# Patient Record
Sex: MALE | Race: WHITE | NOT HISPANIC OR LATINO | Employment: FULL TIME | URBAN - METROPOLITAN AREA
[De-identification: names, ages, dates, MRNs, and addresses within clinical notes are randomized per-mention and may not be internally consistent; named-entity substitution may affect disease eponyms.]

---

## 2017-06-20 ENCOUNTER — TRANSCRIBE ORDERS (OUTPATIENT)
Dept: ADMINISTRATIVE | Facility: HOSPITAL | Age: 57
End: 2017-06-20

## 2017-06-20 ENCOUNTER — APPOINTMENT (OUTPATIENT)
Dept: LAB | Facility: MEDICAL CENTER | Age: 57
End: 2017-06-20
Payer: COMMERCIAL

## 2017-06-20 DIAGNOSIS — R73.01 IMPAIRED FASTING GLUCOSE: Primary | ICD-10-CM

## 2017-06-20 DIAGNOSIS — R73.01 IMPAIRED FASTING GLUCOSE: ICD-10-CM

## 2017-06-20 LAB
EST. AVERAGE GLUCOSE BLD GHB EST-MCNC: 111 MG/DL
HBA1C MFR BLD: 5.5 % (ref 4.2–6.3)

## 2017-06-20 PROCEDURE — 36415 COLL VENOUS BLD VENIPUNCTURE: CPT

## 2017-06-20 PROCEDURE — 83036 HEMOGLOBIN GLYCOSYLATED A1C: CPT

## 2017-06-27 ENCOUNTER — APPOINTMENT (OUTPATIENT)
Dept: PHYSICAL THERAPY | Facility: CLINIC | Age: 57
End: 2017-06-27
Payer: COMMERCIAL

## 2017-06-27 PROCEDURE — 97535 SELF CARE MNGMENT TRAINING: CPT

## 2017-06-27 PROCEDURE — 97014 ELECTRIC STIMULATION THERAPY: CPT

## 2017-06-27 PROCEDURE — 97035 APP MDLTY 1+ULTRASOUND EA 15: CPT

## 2017-06-27 PROCEDURE — 97162 PT EVAL MOD COMPLEX 30 MIN: CPT

## 2017-06-27 PROCEDURE — 97010 HOT OR COLD PACKS THERAPY: CPT

## 2017-06-27 PROCEDURE — 97140 MANUAL THERAPY 1/> REGIONS: CPT

## 2017-06-27 PROCEDURE — 97110 THERAPEUTIC EXERCISES: CPT

## 2017-06-27 PROCEDURE — G0283 ELEC STIM OTHER THAN WOUND: HCPCS

## 2017-06-30 ENCOUNTER — APPOINTMENT (OUTPATIENT)
Dept: PHYSICAL THERAPY | Facility: CLINIC | Age: 57
End: 2017-06-30
Payer: COMMERCIAL

## 2017-06-30 PROCEDURE — 97014 ELECTRIC STIMULATION THERAPY: CPT

## 2017-06-30 PROCEDURE — 97112 NEUROMUSCULAR REEDUCATION: CPT

## 2017-06-30 PROCEDURE — 97010 HOT OR COLD PACKS THERAPY: CPT

## 2017-06-30 PROCEDURE — 97110 THERAPEUTIC EXERCISES: CPT

## 2017-06-30 PROCEDURE — 97035 APP MDLTY 1+ULTRASOUND EA 15: CPT

## 2017-06-30 PROCEDURE — G0283 ELEC STIM OTHER THAN WOUND: HCPCS

## 2017-06-30 PROCEDURE — 97140 MANUAL THERAPY 1/> REGIONS: CPT

## 2017-07-03 ENCOUNTER — APPOINTMENT (OUTPATIENT)
Dept: PHYSICAL THERAPY | Facility: CLINIC | Age: 57
End: 2017-07-03
Payer: COMMERCIAL

## 2017-07-03 PROCEDURE — 97530 THERAPEUTIC ACTIVITIES: CPT

## 2017-07-03 PROCEDURE — 97010 HOT OR COLD PACKS THERAPY: CPT

## 2017-07-03 PROCEDURE — 97112 NEUROMUSCULAR REEDUCATION: CPT

## 2017-07-03 PROCEDURE — 97035 APP MDLTY 1+ULTRASOUND EA 15: CPT

## 2017-07-03 PROCEDURE — 97014 ELECTRIC STIMULATION THERAPY: CPT

## 2017-07-03 PROCEDURE — G0283 ELEC STIM OTHER THAN WOUND: HCPCS

## 2017-07-03 PROCEDURE — 97110 THERAPEUTIC EXERCISES: CPT

## 2017-07-03 PROCEDURE — 97140 MANUAL THERAPY 1/> REGIONS: CPT

## 2017-07-07 ENCOUNTER — APPOINTMENT (OUTPATIENT)
Dept: PHYSICAL THERAPY | Facility: CLINIC | Age: 57
End: 2017-07-07
Payer: COMMERCIAL

## 2017-07-07 PROCEDURE — 97110 THERAPEUTIC EXERCISES: CPT

## 2017-07-07 PROCEDURE — G0283 ELEC STIM OTHER THAN WOUND: HCPCS

## 2017-07-07 PROCEDURE — 97035 APP MDLTY 1+ULTRASOUND EA 15: CPT

## 2017-07-07 PROCEDURE — 97530 THERAPEUTIC ACTIVITIES: CPT

## 2017-07-07 PROCEDURE — 97014 ELECTRIC STIMULATION THERAPY: CPT

## 2017-07-07 PROCEDURE — 97010 HOT OR COLD PACKS THERAPY: CPT

## 2017-07-07 PROCEDURE — 97140 MANUAL THERAPY 1/> REGIONS: CPT

## 2017-07-07 PROCEDURE — 97112 NEUROMUSCULAR REEDUCATION: CPT

## 2017-07-10 ENCOUNTER — APPOINTMENT (OUTPATIENT)
Dept: PHYSICAL THERAPY | Facility: CLINIC | Age: 57
End: 2017-07-10
Payer: COMMERCIAL

## 2017-07-11 ENCOUNTER — APPOINTMENT (OUTPATIENT)
Dept: PHYSICAL THERAPY | Facility: CLINIC | Age: 57
End: 2017-07-11
Payer: COMMERCIAL

## 2017-07-11 PROCEDURE — 97112 NEUROMUSCULAR REEDUCATION: CPT

## 2017-07-11 PROCEDURE — 97014 ELECTRIC STIMULATION THERAPY: CPT

## 2017-07-11 PROCEDURE — 97010 HOT OR COLD PACKS THERAPY: CPT

## 2017-07-11 PROCEDURE — 97530 THERAPEUTIC ACTIVITIES: CPT

## 2017-07-11 PROCEDURE — G0283 ELEC STIM OTHER THAN WOUND: HCPCS

## 2017-07-11 PROCEDURE — 97140 MANUAL THERAPY 1/> REGIONS: CPT

## 2017-07-11 PROCEDURE — 97110 THERAPEUTIC EXERCISES: CPT

## 2017-07-11 PROCEDURE — 97035 APP MDLTY 1+ULTRASOUND EA 15: CPT

## 2017-07-14 ENCOUNTER — APPOINTMENT (OUTPATIENT)
Dept: PHYSICAL THERAPY | Facility: CLINIC | Age: 57
End: 2017-07-14
Payer: COMMERCIAL

## 2017-07-14 PROCEDURE — G0283 ELEC STIM OTHER THAN WOUND: HCPCS

## 2017-07-14 PROCEDURE — 97140 MANUAL THERAPY 1/> REGIONS: CPT

## 2017-07-14 PROCEDURE — 97010 HOT OR COLD PACKS THERAPY: CPT

## 2017-07-14 PROCEDURE — 97112 NEUROMUSCULAR REEDUCATION: CPT

## 2017-07-14 PROCEDURE — 97530 THERAPEUTIC ACTIVITIES: CPT

## 2017-07-14 PROCEDURE — 97014 ELECTRIC STIMULATION THERAPY: CPT

## 2017-07-14 PROCEDURE — 97035 APP MDLTY 1+ULTRASOUND EA 15: CPT

## 2017-07-14 PROCEDURE — 97110 THERAPEUTIC EXERCISES: CPT

## 2017-07-17 ENCOUNTER — APPOINTMENT (OUTPATIENT)
Dept: PHYSICAL THERAPY | Facility: CLINIC | Age: 57
End: 2017-07-17
Payer: COMMERCIAL

## 2017-07-17 PROCEDURE — G0283 ELEC STIM OTHER THAN WOUND: HCPCS

## 2017-07-17 PROCEDURE — 97035 APP MDLTY 1+ULTRASOUND EA 15: CPT

## 2017-07-17 PROCEDURE — 97110 THERAPEUTIC EXERCISES: CPT

## 2017-07-17 PROCEDURE — 97010 HOT OR COLD PACKS THERAPY: CPT

## 2017-07-17 PROCEDURE — 97530 THERAPEUTIC ACTIVITIES: CPT

## 2017-07-17 PROCEDURE — 97112 NEUROMUSCULAR REEDUCATION: CPT

## 2017-07-17 PROCEDURE — 97014 ELECTRIC STIMULATION THERAPY: CPT

## 2017-07-17 PROCEDURE — 97140 MANUAL THERAPY 1/> REGIONS: CPT

## 2017-07-21 ENCOUNTER — APPOINTMENT (OUTPATIENT)
Dept: PHYSICAL THERAPY | Facility: CLINIC | Age: 57
End: 2017-07-21
Payer: COMMERCIAL

## 2017-07-24 ENCOUNTER — APPOINTMENT (OUTPATIENT)
Dept: PHYSICAL THERAPY | Facility: CLINIC | Age: 57
End: 2017-07-24
Payer: COMMERCIAL

## 2017-07-24 PROCEDURE — 97010 HOT OR COLD PACKS THERAPY: CPT

## 2017-07-24 PROCEDURE — 97530 THERAPEUTIC ACTIVITIES: CPT

## 2017-07-24 PROCEDURE — 97140 MANUAL THERAPY 1/> REGIONS: CPT

## 2017-07-24 PROCEDURE — 97112 NEUROMUSCULAR REEDUCATION: CPT

## 2017-07-24 PROCEDURE — 97014 ELECTRIC STIMULATION THERAPY: CPT

## 2017-07-24 PROCEDURE — G0283 ELEC STIM OTHER THAN WOUND: HCPCS

## 2017-07-24 PROCEDURE — 97035 APP MDLTY 1+ULTRASOUND EA 15: CPT

## 2017-07-24 PROCEDURE — 97110 THERAPEUTIC EXERCISES: CPT

## 2017-07-27 ENCOUNTER — APPOINTMENT (OUTPATIENT)
Dept: LAB | Facility: MEDICAL CENTER | Age: 57
End: 2017-07-27
Payer: COMMERCIAL

## 2017-07-27 ENCOUNTER — TRANSCRIBE ORDERS (OUTPATIENT)
Dept: LAB | Facility: MEDICAL CENTER | Age: 57
End: 2017-07-27

## 2017-07-27 DIAGNOSIS — G56.03 CARPAL TUNNEL SYNDROME, BILATERAL: Primary | ICD-10-CM

## 2017-07-27 DIAGNOSIS — G56.03 CARPAL TUNNEL SYNDROME, BILATERAL: ICD-10-CM

## 2017-07-27 DIAGNOSIS — Z01.818 PREOP EXAMINATION: ICD-10-CM

## 2017-07-27 LAB
ANION GAP SERPL CALCULATED.3IONS-SCNC: 5 MMOL/L (ref 4–13)
BUN SERPL-MCNC: 11 MG/DL (ref 5–25)
CALCIUM SERPL-MCNC: 9.1 MG/DL (ref 8.3–10.1)
CHLORIDE SERPL-SCNC: 106 MMOL/L (ref 100–108)
CO2 SERPL-SCNC: 28 MMOL/L (ref 21–32)
CREAT SERPL-MCNC: 0.93 MG/DL (ref 0.6–1.3)
ERYTHROCYTE [DISTWIDTH] IN BLOOD BY AUTOMATED COUNT: 12.8 % (ref 11.6–15.1)
GFR SERPL CREATININE-BSD FRML MDRD: 91 ML/MIN/1.73SQ M
GLUCOSE SERPL-MCNC: 97 MG/DL (ref 65–140)
HCT VFR BLD AUTO: 46.4 % (ref 36.5–49.3)
HGB BLD-MCNC: 15.7 G/DL (ref 12–17)
MCH RBC QN AUTO: 30.9 PG (ref 26.8–34.3)
MCHC RBC AUTO-ENTMCNC: 33.8 G/DL (ref 31.4–37.4)
MCV RBC AUTO: 91 FL (ref 82–98)
PLATELET # BLD AUTO: 212 THOUSANDS/UL (ref 149–390)
PMV BLD AUTO: 11.5 FL (ref 8.9–12.7)
POTASSIUM SERPL-SCNC: 5.1 MMOL/L (ref 3.5–5.3)
RBC # BLD AUTO: 5.08 MILLION/UL (ref 3.88–5.62)
SODIUM SERPL-SCNC: 139 MMOL/L (ref 136–145)
WBC # BLD AUTO: 5.59 THOUSAND/UL (ref 4.31–10.16)

## 2017-07-27 PROCEDURE — 85027 COMPLETE CBC AUTOMATED: CPT

## 2017-07-27 PROCEDURE — 36415 COLL VENOUS BLD VENIPUNCTURE: CPT

## 2017-07-27 PROCEDURE — 80048 BASIC METABOLIC PNL TOTAL CA: CPT

## 2017-07-28 ENCOUNTER — APPOINTMENT (OUTPATIENT)
Dept: PHYSICAL THERAPY | Facility: CLINIC | Age: 57
End: 2017-07-28
Payer: COMMERCIAL

## 2017-08-06 ENCOUNTER — APPOINTMENT (EMERGENCY)
Dept: RADIOLOGY | Facility: HOSPITAL | Age: 57
End: 2017-08-06
Payer: COMMERCIAL

## 2017-08-06 ENCOUNTER — HOSPITAL ENCOUNTER (EMERGENCY)
Facility: HOSPITAL | Age: 57
Discharge: HOME | End: 2017-08-06
Attending: EMERGENCY MEDICINE
Payer: COMMERCIAL

## 2017-08-06 VITALS
HEART RATE: 74 BPM | OXYGEN SATURATION: 100 % | RESPIRATION RATE: 17 BRPM | SYSTOLIC BLOOD PRESSURE: 120 MMHG | WEIGHT: 210 LBS | DIASTOLIC BLOOD PRESSURE: 66 MMHG | TEMPERATURE: 97.4 F

## 2017-08-06 DIAGNOSIS — K12.2 UVULITIS: Primary | ICD-10-CM

## 2017-08-06 LAB — S PYO AG THROAT QL: NEGATIVE

## 2017-08-06 PROCEDURE — 96374 THER/PROPH/DIAG INJ IV PUSH: CPT

## 2017-08-06 PROCEDURE — 99284 EMERGENCY DEPT VISIT MOD MDM: CPT

## 2017-08-06 PROCEDURE — 87070 CULTURE OTHR SPECIMN AEROBIC: CPT | Performed by: EMERGENCY MEDICINE

## 2017-08-06 PROCEDURE — 87430 STREP A AG IA: CPT | Performed by: EMERGENCY MEDICINE

## 2017-08-06 PROCEDURE — 70360 X-RAY EXAM OF NECK: CPT

## 2017-08-06 RX ORDER — EZETIMIBE 10 MG/1
10 TABLET ORAL DAILY
COMMUNITY

## 2017-08-06 RX ORDER — CEPHALEXIN 500 MG/1
500 CAPSULE ORAL 4 TIMES DAILY
COMMUNITY
End: 2017-08-06

## 2017-08-06 RX ORDER — ATORVASTATIN CALCIUM 20 MG/1
20 TABLET, FILM COATED ORAL DAILY
COMMUNITY

## 2017-08-06 RX ORDER — PANTOPRAZOLE SODIUM 40 MG/1
40 TABLET, DELAYED RELEASE ORAL DAILY
Status: ON HOLD | COMMUNITY
End: 2022-03-30 | Stop reason: SDUPTHER

## 2017-08-06 RX ORDER — LISINOPRIL 10 MG/1
10 TABLET ORAL DAILY
COMMUNITY
End: 2022-03-30 | Stop reason: HOSPADM

## 2017-08-06 RX ORDER — HYDROCODONE BITARTRATE AND ACETAMINOPHEN 5; 325 MG/1; MG/1
1 TABLET ORAL EVERY 6 HOURS PRN
COMMUNITY
End: 2022-03-30 | Stop reason: HOSPADM

## 2017-08-06 RX ORDER — LEVOTHYROXINE SODIUM 0.1 MG/1
100 TABLET ORAL DAILY
COMMUNITY

## 2017-08-06 RX ORDER — DEXAMETHASONE 2 MG/1
10 TABLET ORAL ONCE
Qty: 5 TABLET | Refills: 0 | Status: SHIPPED | OUTPATIENT
Start: 2017-08-08 | End: 2017-08-08

## 2017-08-06 RX ADMIN — DEXAMETHASONE SODIUM PHOSPHATE 10 MG: 10 INJECTION INTRAMUSCULAR; INTRAVENOUS at 12:39

## 2017-08-06 RX ADMIN — LIDOCAINE HYDROCHLORIDE 15 ML: 20 SOLUTION ORAL; TOPICAL at 12:38

## 2017-08-08 LAB — BACTERIA THROAT CULT: NORMAL

## 2019-02-02 ENCOUNTER — OFFICE VISIT (OUTPATIENT)
Dept: URGENT CARE | Facility: CLINIC | Age: 59
End: 2019-02-02
Payer: COMMERCIAL

## 2019-02-02 ENCOUNTER — APPOINTMENT (OUTPATIENT)
Dept: RADIOLOGY | Facility: CLINIC | Age: 59
End: 2019-02-02
Payer: COMMERCIAL

## 2019-02-02 VITALS
RESPIRATION RATE: 18 BRPM | HEIGHT: 70 IN | DIASTOLIC BLOOD PRESSURE: 91 MMHG | OXYGEN SATURATION: 97 % | HEART RATE: 101 BPM | BODY MASS INDEX: 30.35 KG/M2 | WEIGHT: 212 LBS | TEMPERATURE: 101.7 F | SYSTOLIC BLOOD PRESSURE: 140 MMHG

## 2019-02-02 DIAGNOSIS — L50.9 HIVES: ICD-10-CM

## 2019-02-02 DIAGNOSIS — J20.8 ACUTE BACTERIAL BRONCHITIS: Primary | ICD-10-CM

## 2019-02-02 DIAGNOSIS — B96.89 ACUTE BACTERIAL BRONCHITIS: Primary | ICD-10-CM

## 2019-02-02 DIAGNOSIS — R05.9 COUGH: ICD-10-CM

## 2019-02-02 PROCEDURE — 71046 X-RAY EXAM CHEST 2 VIEWS: CPT

## 2019-02-02 PROCEDURE — 99213 OFFICE O/P EST LOW 20 MIN: CPT | Performed by: PHYSICIAN ASSISTANT

## 2019-02-02 RX ORDER — LOSARTAN POTASSIUM 50 MG/1
50 TABLET ORAL
COMMUNITY
End: 2022-03-30 | Stop reason: HOSPADM

## 2019-02-02 RX ORDER — METHYLPREDNISOLONE 4 MG/1
TABLET ORAL
Qty: 21 TABLET | Refills: 0 | Status: SHIPPED | OUTPATIENT
Start: 2019-02-02 | End: 2022-03-30 | Stop reason: HOSPADM

## 2019-02-02 RX ORDER — AZITHROMYCIN 250 MG/1
TABLET, FILM COATED ORAL
Qty: 6 TABLET | Refills: 0 | Status: SHIPPED | OUTPATIENT
Start: 2019-02-02 | End: 2019-02-06

## 2019-02-02 RX ORDER — ROSUVASTATIN CALCIUM 10 MG/1
TABLET, COATED ORAL
COMMUNITY
End: 2022-03-30 | Stop reason: HOSPADM

## 2019-03-26 ENCOUNTER — TRANSCRIBE ORDERS (OUTPATIENT)
Dept: ADMINISTRATIVE | Facility: HOSPITAL | Age: 59
End: 2019-03-26

## 2019-03-26 ENCOUNTER — APPOINTMENT (OUTPATIENT)
Dept: LAB | Facility: MEDICAL CENTER | Age: 59
End: 2019-03-26
Payer: COMMERCIAL

## 2019-03-26 DIAGNOSIS — E03.9 ACQUIRED HYPOTHYROIDISM: Primary | ICD-10-CM

## 2019-03-26 DIAGNOSIS — E03.9 ACQUIRED HYPOTHYROIDISM: ICD-10-CM

## 2019-03-26 LAB — TSH SERPL DL<=0.05 MIU/L-ACNC: 2.62 UIU/ML (ref 0.36–3.74)

## 2019-03-26 PROCEDURE — 84443 ASSAY THYROID STIM HORMONE: CPT

## 2019-03-26 PROCEDURE — 36415 COLL VENOUS BLD VENIPUNCTURE: CPT

## 2019-05-30 ENCOUNTER — APPOINTMENT (OUTPATIENT)
Dept: LAB | Facility: MEDICAL CENTER | Age: 59
End: 2019-05-30
Payer: COMMERCIAL

## 2019-05-30 ENCOUNTER — TRANSCRIBE ORDERS (OUTPATIENT)
Dept: ADMINISTRATIVE | Facility: HOSPITAL | Age: 59
End: 2019-05-30

## 2019-05-30 DIAGNOSIS — E03.9 ACQUIRED HYPOTHYROIDISM: ICD-10-CM

## 2019-05-30 DIAGNOSIS — E03.9 ACQUIRED HYPOTHYROIDISM: Primary | ICD-10-CM

## 2019-05-30 LAB — TSH SERPL DL<=0.05 MIU/L-ACNC: 1.64 UIU/ML (ref 0.36–3.74)

## 2019-05-30 PROCEDURE — 84443 ASSAY THYROID STIM HORMONE: CPT

## 2019-05-30 PROCEDURE — 36415 COLL VENOUS BLD VENIPUNCTURE: CPT

## 2019-09-05 ENCOUNTER — OFFICE VISIT (OUTPATIENT)
Dept: FAMILY MEDICINE CLINIC | Facility: CLINIC | Age: 59
End: 2019-09-05

## 2019-09-05 VITALS
HEIGHT: 70 IN | BODY MASS INDEX: 31.92 KG/M2 | SYSTOLIC BLOOD PRESSURE: 132 MMHG | DIASTOLIC BLOOD PRESSURE: 82 MMHG | WEIGHT: 223 LBS

## 2019-09-05 DIAGNOSIS — Z02.89 ENCOUNTER FOR FEDERAL AVIATION ADMINISTRATION (FAA) EXAMINATION: Primary | ICD-10-CM

## 2019-09-05 PROCEDURE — 99499 UNLISTED E&M SERVICE: CPT | Performed by: FAMILY MEDICINE

## 2019-10-22 ENCOUNTER — TRANSCRIBE ORDERS (OUTPATIENT)
Dept: ADMINISTRATIVE | Facility: HOSPITAL | Age: 59
End: 2019-10-22

## 2019-10-22 ENCOUNTER — APPOINTMENT (OUTPATIENT)
Dept: LAB | Facility: MEDICAL CENTER | Age: 59
End: 2019-10-22
Payer: COMMERCIAL

## 2019-10-22 DIAGNOSIS — E03.9 ACQUIRED HYPOTHYROIDISM: Primary | ICD-10-CM

## 2019-10-22 DIAGNOSIS — E03.9 ACQUIRED HYPOTHYROIDISM: ICD-10-CM

## 2019-10-22 LAB
T4 FREE SERPL-MCNC: 0.7 NG/DL (ref 0.76–1.46)
TSH SERPL DL<=0.05 MIU/L-ACNC: 2.9 UIU/ML (ref 0.36–3.74)

## 2019-10-22 PROCEDURE — 84439 ASSAY OF FREE THYROXINE: CPT

## 2019-10-22 PROCEDURE — 36415 COLL VENOUS BLD VENIPUNCTURE: CPT

## 2019-10-22 PROCEDURE — 84443 ASSAY THYROID STIM HORMONE: CPT

## 2019-12-27 ENCOUNTER — OFFICE VISIT (OUTPATIENT)
Dept: URGENT CARE | Facility: CLINIC | Age: 59
End: 2019-12-27
Payer: COMMERCIAL

## 2019-12-27 VITALS
BODY MASS INDEX: 31.92 KG/M2 | TEMPERATURE: 98 F | RESPIRATION RATE: 18 BRPM | HEART RATE: 83 BPM | HEIGHT: 70 IN | OXYGEN SATURATION: 96 % | DIASTOLIC BLOOD PRESSURE: 76 MMHG | SYSTOLIC BLOOD PRESSURE: 118 MMHG | WEIGHT: 223 LBS

## 2019-12-27 DIAGNOSIS — R68.89 FLU-LIKE SYMPTOMS: Primary | ICD-10-CM

## 2019-12-27 PROCEDURE — 99213 OFFICE O/P EST LOW 20 MIN: CPT | Performed by: PHYSICIAN ASSISTANT

## 2019-12-27 RX ORDER — OSELTAMIVIR PHOSPHATE 75 MG/1
75 CAPSULE ORAL 2 TIMES DAILY
Qty: 10 CAPSULE | Refills: 0 | Status: SHIPPED | OUTPATIENT
Start: 2019-12-27 | End: 2020-01-01

## 2019-12-27 RX ORDER — CANDESARTAN 16 MG/1
16 TABLET ORAL DAILY
COMMUNITY

## 2019-12-27 NOTE — PROGRESS NOTES
[id number]    NAME: Domi Martinez is a 60 y o  male  : 1960    MRN: [id number]  DATE: 2019  TIME: 6:27 PM    Assessment and Plan   Flu-like symptoms [R68 89]  1  Flu-like symptoms  oseltamivir (TAMIFLU) 75 mg capsule       Patient Instructions   Patient Instructions   Infection appears viral   Recommend symptomatic treatment  Can take ibuprofen or tylenol as needed for pain or fever  Over the counter cough and cold medications to help with symptoms  Use salt water gargles for sore throat and throat lozenges  Cough drops as needed  Wash hands frequently to prevent the spread of infection  If not improving over the next 7-10 days, follow up with PCP  Symptoms may persist for 10-14 days  tamiflu as directed  Chief Complaint     Chief Complaint   Patient presents with    Cough     cough, body aches, chills today       History of Present Illness   50-year-old male here with complaint cough, body aches and chills that started today  Son started with the same symptoms yesterday and had also has a fever  Concerned that they may have flu  Denies any sore throat  Has a headache  Cough is deep but dry and nonproductive  No shortness of breath or wheezing  Review of Systems   Review of Systems   Constitutional: Positive for chills  Negative for fatigue and fever  HENT: Negative for congestion, ear pain, postnasal drip, sinus pressure and sore throat  Respiratory: Positive for cough  Negative for shortness of breath and wheezing  Musculoskeletal: Positive for myalgias  Neurological: Positive for headaches  All other systems reviewed and are negative        Current Medications     Current Outpatient Medications:     atorvastatin (LIPITOR) 20 mg tablet, Take 20 mg by mouth daily, Disp: , Rfl:     candesartan (ATACAND) 16 mg tablet, Take 16 mg by mouth daily, Disp: , Rfl:     ezetimibe (ZETIA) 10 mg tablet, Take 10 mg by mouth daily, Disp: , Rfl:    levothyroxine 100 mcg tablet, Take 100 mcg by mouth daily, Disp: , Rfl:     pantoprazole (PROTONIX) 40 mg tablet, Take 40 mg by mouth daily, Disp: , Rfl:     rosuvastatin (CRESTOR) 10 MG tablet, Take by mouth, Disp: , Rfl:     HYDROcodone-acetaminophen (NORCO) 5-325 mg per tablet, Take 1 tablet by mouth every 6 (six) hours as needed for pain, Disp: , Rfl:     lisinopril (ZESTRIL) 10 mg tablet, Take 10 mg by mouth daily, Disp: , Rfl:     losartan (COZAAR) 50 mg tablet, Take 50 mg by mouth, Disp: , Rfl:     methylprednisolone (MEDROL) 4 mg tablet, Medrol dosepak, take as directed (Patient not taking: Reported on 12/27/2019), Disp: 21 tablet, Rfl: 0    oseltamivir (TAMIFLU) 75 mg capsule, Take 1 capsule (75 mg total) by mouth 2 (two) times a day for 5 days, Disp: 10 capsule, Rfl: 0    Current Allergies     Allergies as of 12/27/2019    (No Known Allergies)          The following portions of the patient's history were reviewed and updated as appropriate: allergies, current medications, past family history, past medical history, past social history, past surgical history and problem list    Past Medical History:   Diagnosis Date    Disease of thyroid gland     GERD (gastroesophageal reflux disease)     Hyperlipidemia     Hypertension      Past Surgical History:   Procedure Laterality Date    CARPAL TUNNEL RELEASE      TONSILLECTOMY       Family History   Problem Relation Age of Onset    Thyroid disease Mother     Heart attack Father     Heart attack Paternal Grandmother     Heart disease Paternal Grandmother         Pacemaker    Heart disease Paternal Grandfather      Social History     Socioeconomic History    Marital status: /Civil Union     Spouse name: Not on file    Number of children: Not on file    Years of education: Not on file    Highest education level: Not on file   Occupational History    Not on file   Social Needs    Financial resource strain: Not on file    Food insecurity: Worry: Not on file     Inability: Not on file    Transportation needs:     Medical: Not on file     Non-medical: Not on file   Tobacco Use    Smoking status: Never Smoker    Smokeless tobacco: Never Used    Tobacco comment: per [employer]-Former Smoker   Substance and Sexual Activity    Alcohol use: Yes     Comment: socially    Drug use: No     Comment: Denied use    Sexual activity: Not on file   Lifestyle    Physical activity:     Days per week: Not on file     Minutes per session: Not on file    Stress: Not on file   Relationships    Social connections:     Talks on phone: Not on file     Gets together: Not on file     Attends [employer] service: Not on file     Active member of club or organization: Not on file     Attends meetings of clubs or organizations: Not on file     Relationship status: Not on file    Intimate partner violence:     Fear of current or ex partner: Not on file     Emotionally abused: Not on file     Physically abused: Not on file     Forced sexual activity: Not on file   Other Topics Concern    Not on file   Social History Narrative    Not on file     Medications have been verified  Objective   /76   Pulse 83   Temp 98 °F (36 7 °C) (Tympanic)   Resp 18   Ht 5' 10" (1 778 m)   Wt 101 kg (223 lb)   SpO2 96%   BMI 32 00 kg/m²      Physical Exam   Physical Exam   Constitutional: He appears well-developed and well-nourished  No distress  HENT:   Head: Normocephalic and atraumatic  Right Ear: Tympanic membrane normal    Left Ear: Tympanic membrane normal    Nose: Mucosal edema present  Mouth/Throat: Oropharynx is clear and moist    Cardiovascular: Normal rate, regular rhythm and normal heart sounds  Pulmonary/Chest: Effort normal and breath sounds normal  No respiratory distress  Abdominal: Soft  Bowel sounds are normal    Nursing note and vitals reviewed

## 2019-12-27 NOTE — PATIENT INSTRUCTIONS
Infection appears viral   Recommend symptomatic treatment  Can take ibuprofen or tylenol as needed for pain or fever  Over the counter cough and cold medications to help with symptoms  Use salt water gargles for sore throat and throat lozenges  Cough drops as needed  Wash hands frequently to prevent the spread of infection  If not improving over the next 7-10 days, follow up with PCP  Symptoms may persist for 10-14 days  tamiflu as directed

## 2020-10-19 ENCOUNTER — LAB (OUTPATIENT)
Dept: LAB | Facility: MEDICAL CENTER | Age: 60
End: 2020-10-19
Payer: COMMERCIAL

## 2020-10-19 ENCOUNTER — TRANSCRIBE ORDERS (OUTPATIENT)
Dept: LAB | Facility: MEDICAL CENTER | Age: 60
End: 2020-10-19

## 2020-10-19 DIAGNOSIS — I51.9 MYXEDEMA HEART DISEASE: ICD-10-CM

## 2020-10-19 DIAGNOSIS — E03.9 MYXEDEMA HEART DISEASE: ICD-10-CM

## 2020-10-19 DIAGNOSIS — I51.9 MYXEDEMA HEART DISEASE: Primary | ICD-10-CM

## 2020-10-19 DIAGNOSIS — E03.9 MYXEDEMA HEART DISEASE: Primary | ICD-10-CM

## 2020-10-19 LAB
T4 FREE SERPL-MCNC: 0.98 NG/DL (ref 0.76–1.46)
TSH SERPL DL<=0.05 MIU/L-ACNC: 1.84 UIU/ML (ref 0.36–3.74)

## 2020-10-19 PROCEDURE — 36415 COLL VENOUS BLD VENIPUNCTURE: CPT

## 2020-10-19 PROCEDURE — 84439 ASSAY OF FREE THYROXINE: CPT

## 2020-10-19 PROCEDURE — 84443 ASSAY THYROID STIM HORMONE: CPT

## 2020-12-01 ENCOUNTER — LAB (OUTPATIENT)
Dept: LAB | Facility: MEDICAL CENTER | Age: 60
End: 2020-12-01
Payer: COMMERCIAL

## 2020-12-01 ENCOUNTER — TRANSCRIBE ORDERS (OUTPATIENT)
Dept: LAB | Facility: MEDICAL CENTER | Age: 60
End: 2020-12-01

## 2020-12-01 DIAGNOSIS — E03.9 HYPOTHYROIDISM, ADULT: ICD-10-CM

## 2020-12-01 DIAGNOSIS — E03.9 HYPOTHYROIDISM, ADULT: Primary | ICD-10-CM

## 2020-12-01 LAB
T4 FREE SERPL-MCNC: 0.83 NG/DL (ref 0.76–1.46)
TSH SERPL DL<=0.05 MIU/L-ACNC: 1.61 UIU/ML (ref 0.36–3.74)

## 2020-12-01 PROCEDURE — 84443 ASSAY THYROID STIM HORMONE: CPT

## 2020-12-01 PROCEDURE — 36415 COLL VENOUS BLD VENIPUNCTURE: CPT

## 2020-12-01 PROCEDURE — 84439 ASSAY OF FREE THYROXINE: CPT

## 2021-01-15 ENCOUNTER — APPOINTMENT (OUTPATIENT)
Dept: RADIOLOGY | Facility: MEDICAL CENTER | Age: 61
End: 2021-01-15
Payer: COMMERCIAL

## 2021-01-15 DIAGNOSIS — M25.511 RIGHT SHOULDER PAIN: ICD-10-CM

## 2021-01-15 DIAGNOSIS — M25.572 ARTHRALGIA OF LEFT ANKLE OR FOOT: ICD-10-CM

## 2021-01-15 PROCEDURE — 73030 X-RAY EXAM OF SHOULDER: CPT

## 2021-01-15 PROCEDURE — 73600 X-RAY EXAM OF ANKLE: CPT

## 2021-01-15 PROCEDURE — 73620 X-RAY EXAM OF FOOT: CPT

## 2021-02-04 ENCOUNTER — TRANSCRIBE ORDERS (OUTPATIENT)
Dept: ADMINISTRATIVE | Facility: HOSPITAL | Age: 61
End: 2021-02-04

## 2021-02-04 DIAGNOSIS — M25.511 CHRONIC RIGHT SHOULDER PAIN: Primary | ICD-10-CM

## 2021-02-04 DIAGNOSIS — G89.29 CHRONIC RIGHT SHOULDER PAIN: Primary | ICD-10-CM

## 2021-02-09 ENCOUNTER — HOSPITAL ENCOUNTER (OUTPATIENT)
Dept: MRI IMAGING | Facility: HOSPITAL | Age: 61
Discharge: HOME | End: 2021-02-09
Payer: COMMERCIAL

## 2021-02-09 DIAGNOSIS — M25.511 CHRONIC RIGHT SHOULDER PAIN: ICD-10-CM

## 2021-02-09 DIAGNOSIS — G89.29 CHRONIC RIGHT SHOULDER PAIN: ICD-10-CM

## 2021-02-09 PROCEDURE — 73221 MRI JOINT UPR EXTREM W/O DYE: CPT

## 2021-02-09 PROCEDURE — G1004 CDSM NDSC: HCPCS

## 2021-03-24 ENCOUNTER — TRANSCRIBE ORDERS (OUTPATIENT)
Dept: PHYSICAL THERAPY | Facility: CLINIC | Age: 61
End: 2021-03-24

## 2021-03-24 ENCOUNTER — EVALUATION (OUTPATIENT)
Dept: PHYSICAL THERAPY | Facility: CLINIC | Age: 61
End: 2021-03-24
Payer: COMMERCIAL

## 2021-03-24 DIAGNOSIS — M75.41 IMPINGEMENT SYNDROME OF RIGHT SHOULDER: Primary | ICD-10-CM

## 2021-03-24 PROCEDURE — 97161 PT EVAL LOW COMPLEX 20 MIN: CPT | Performed by: PHYSICAL THERAPIST

## 2021-03-24 PROCEDURE — 97110 THERAPEUTIC EXERCISES: CPT | Performed by: PHYSICAL THERAPIST

## 2021-03-24 PROCEDURE — 97140 MANUAL THERAPY 1/> REGIONS: CPT | Performed by: PHYSICAL THERAPIST

## 2021-03-24 PROCEDURE — 97535 SELF CARE MNGMENT TRAINING: CPT | Performed by: PHYSICAL THERAPIST

## 2021-03-24 NOTE — LETTER
2021    Mt Cotton, [place] [place], [place]  [address] [place] [address]    Patient: Sarath Fragoso   YOB: 1960   Date of Visit: 3/24/2021     Encounter Diagnosis     ICD-10-CM    1  Impingement syndrome of right shoulder  M75 41        Dear Dr Vincenzo Fish: Thank you for your recent referral of Sarath Fragoso  Please review the attached evaluation summary from [address] recent visit  Please verify that you agree with the plan of care by signing the attached order  If you have any questions or concerns, please do not hesitate to call  I sincerely appreciate the opportunity to share in the care of one of your patients and hope to have another opportunity to work with you in the near future  Sincerely,    Dom Atkinson, PT, DPT, ATC, ART      Referring Provider:      I certify that I have read the below Plan of Care and certify the need for these services furnished under this plan of treatment while under my care  Mt Cotton MD  [facility]  [address] [place] [address]  Via Fax: [phone]          PT Evaluation     Today's date: 3/24/2021  Patient name: Sarath Fragoso  : 1960  MRN: [id number]  Referring provider: Antonio Rocha MD  Dx:   Encounter Diagnosis     ICD-10-CM    1   Impingement syndrome of right shoulder  M75 41                   Assessment  Understanding of Dx/Px/POC: good   Prognosis: good    Goals  STGs: To be complete within 4 weeks  - Decrease pain to < 2/10 at worst  - Increase AROM to WNL  - Increase strength to > 4+/5  - Improve postural awareness capacity to > 60min before deficit    LTGs: To be complete within 6 weeks  - Able to repetitively complete all overhead activity without pain or limitation for increased safety and functional capacity with ADLs and work-related duty  - Able to repetitively complete all reaching activity without pain or limitation for increased safety and functional capacity with ADLs and work-related duty  - Able to repetitively complete all pushing/pulling activity without pain or limitation for increased safety and functional capacity with ADLs and work-related duty  - Able to complete all lifting/carrying activity without pain or limitation for increased safety and functional capacity with ADLs and work-related duty    Plan  Planned therapy interventions: manual therapy, patient education, neuromuscular re-education, self care, therapeutic activities and therapeutic exercise       Pt is a 63 y o  male with R shoulder pain who presents with functional deficits including decreased capacity with overhead activity, pushing/pulling, lifting/carrying, and behind the back/cross body reaching activity  Upon completion of today's initial evaluation, Andriy's sx remain consistent with R Shoulder Patient will benefit from skilled physical therapy to address current deficits  Subjective Evaluation    Pain  Current pain ratin  At best pain ratin  At worst pain ratin  Location: R Shoulder    Patient Goals  Patient goals for therapy: increased strength, decreased pain and increased motion         Pt reports R Shoulder pain for nearly 4 months  Reports his sx have continued to worsen to point where it is negatively effecting his overall safety and functional capacity with ADLs and work-related duty  Objective Pain level ranges 2-8/10  AROM: R Shoulder flexion 160 degrees, Abduction 160 degrees, ER 70 degrees, IR 40 degrees  (PROM in all planes + 10 degrees);  L Shoulder WNL  Strength: R Shoulder Abd 4/5, Flex 4/5, ER 3+/5, IR 5/5  Postural Awareness: Poor (rounded shoulders, forward head)  Special Tests: (+) Hawkin's,  (+) Empty Can, (+) O'ravi's  FOTO: 81; GOAL: 81  Unable to complete overhead activity without pain and limitation  Unable to complete pushing/pulling activity without pain and limitation  Unable to complete lifting/carrying activity without pain and limitation  Unable to complete cross body/behind the back reaching activity without pain and limitation             Precautions: None at this time    Daily Treatment Diary    HEP: Handout provided and discussed      Manuals 3/24/21            ART x15'            PROM/Stretch             IASTM             STM/Triggerpoint             JM                          Neuro Re-Ed             Scap retract 3x10            No $ AROM 3x10            Prone Scap retraction  Next session           Sleeper Stretch  Next session           Supine No $ AROM into [place]  Next session           150 degree overhead wall ball   This session                       Ther Ex             TB No $ 3x10 L3            TB ER 3x10 L3            TB 60 degree Abd 3x10 L3            Prone Ys, Ts  Next session           Prone retract with ER  Next session           SL ER, Abd  Next session           TB 90/90   This session          TB D2 Flex   This session                                                              Ther Activity             Retro UBE  Next session                        Gait Training                                       Modalities             MHP             CP x10'            US/Stim

## 2021-03-24 NOTE — PROGRESS NOTES
PT Evaluation     Today's date: 3/24/2021  Patient name: Linette George  : 1960  MRN: [id number]  Referring provider: Deepa Koch MD  Dx:   Encounter Diagnosis     ICD-10-CM    1  Impingement syndrome of right shoulder  M75 41                   Assessment  Understanding of Dx/Px/POC: good   Prognosis: good    Goals  STGs: To be complete within 4 weeks  - Decrease pain to < 2/10 at worst  - Increase AROM to WNL  - Increase strength to > 4+/5  - Improve postural awareness capacity to > 60min before deficit    LTGs: To be complete within 6 weeks  - Able to repetitively complete all overhead activity without pain or limitation for increased safety and functional capacity with ADLs and work-related duty  - Able to repetitively complete all reaching activity without pain or limitation for increased safety and functional capacity with ADLs and work-related duty  - Able to repetitively complete all pushing/pulling activity without pain or limitation for increased safety and functional capacity with ADLs and work-related duty  - Able to complete all lifting/carrying activity without pain or limitation for increased safety and functional capacity with ADLs and work-related duty    Plan  Planned therapy interventions: manual therapy, patient education, neuromuscular re-education, self care, therapeutic activities and therapeutic exercise       Pt is a 63 y o  male with R shoulder pain who presents with functional deficits including decreased capacity with overhead activity, pushing/pulling, lifting/carrying, and behind the back/cross body reaching activity  Upon completion of today's initial evaluation, Andriy's sx remain consistent with R Shoulder Patient will benefit from skilled physical therapy to address current deficits          Subjective Evaluation    Pain  Current pain ratin  At best pain ratin  At worst pain ratin  Location: R Shoulder    Patient Goals  Patient goals for therapy: increased strength, decreased pain and increased motion         Pt reports R Shoulder pain for nearly 4 months  Reports his sx have continued to worsen to point where it is negatively effecting his overall safety and functional capacity with ADLs and work-related duty  Objective Pain level ranges 2-8/10  AROM: R Shoulder flexion 160 degrees, Abduction 160 degrees, ER 70 degrees, IR 40 degrees  (PROM in all planes + 10 degrees);  L Shoulder WNL  Strength: R Shoulder Abd 4/5, Flex 4/5, ER 3+/5, IR 5/5  Postural Awareness: Poor (rounded shoulders, forward head)  Special Tests: (+) Hawkin's,  (+) Empty Can, (+) O'ravi's  FOTO: 81; GOAL: 81  Unable to complete overhead activity without pain and limitation  Unable to complete pushing/pulling activity without pain and limitation  Unable to complete lifting/carrying activity without pain and limitation  Unable to complete cross body/behind the back reaching activity without pain and limitation             Precautions: None at this time    Daily Treatment Diary    HEP: Handout provided and discussed      Manuals 3/24/21            ART x15'            PROM/Stretch             IASTM             STM/Triggerpoint             JM                          Neuro Re-Ed             Scap retract 3x10            No $ AROM 3x10            Prone Scap retraction  Next session           Sleeper Stretch  Next session           Supine No $ AROM into [place]  Next session           150 degree overhead wall ball   This session                       Ther Ex             TB No $ 3x10 L3            TB ER 3x10 L3            TB 60 degree Abd 3x10 L3            Prone Ys, Ts  Next session           Prone retract with ER  Next session           SL ER, Abd  Next session           TB 90/90   This session          TB D2 Flex   This session                                                              Ther Activity             Retro UBE  Next session                        Gait Training Modalities             MHP             CP x10'            US/Stim

## 2021-03-25 ENCOUNTER — APPOINTMENT (OUTPATIENT)
Dept: PHYSICAL THERAPY | Facility: CLINIC | Age: 61
End: 2021-03-25
Payer: COMMERCIAL

## 2021-03-31 ENCOUNTER — OFFICE VISIT (OUTPATIENT)
Dept: PHYSICAL THERAPY | Facility: CLINIC | Age: 61
End: 2021-03-31
Payer: COMMERCIAL

## 2021-03-31 DIAGNOSIS — M75.41 IMPINGEMENT SYNDROME OF RIGHT SHOULDER: Primary | ICD-10-CM

## 2021-03-31 PROCEDURE — 97140 MANUAL THERAPY 1/> REGIONS: CPT | Performed by: PHYSICAL THERAPIST

## 2021-03-31 PROCEDURE — 97110 THERAPEUTIC EXERCISES: CPT | Performed by: PHYSICAL THERAPIST

## 2021-03-31 PROCEDURE — 97112 NEUROMUSCULAR REEDUCATION: CPT | Performed by: PHYSICAL THERAPIST

## 2021-03-31 NOTE — PROGRESS NOTES
Daily Note     Today's date: 3/31/2021  Patient name: Howard Ruvalcaba  : 1960  MRN: [id number]  Referring provider: Donna Shetty MD  Dx:   Encounter Diagnosis     ICD-10-CM    1  Impingement syndrome of right shoulder  M75 41                   Subjective: Pt reports HEP going well  No setbacks  Some appropriate muscle soreness from ART that resolved within a couple days  Objective: See treatment diary below      Assessment: Tolerated treatment well  Patient demonstrated fatigue post treatment, exhibited good technique with therapeutic exercises and would benefit from continued PT      Plan: Continue per plan of care  Progress treatment as tolerated           Precautions: None at this time    Daily Treatment Diary    HEP: Handout provided and discussed      Manuals 3/24/21 3/31/31           ART x15' x15'           PROM/Stretch             IASTM             STM/Triggerpoint             JM                          Neuro Re-Ed             Scap retract 3x10            No $ AROM 3x10            Prone Scap retraction  Next session           Sleeper Stretch  6x20''           Supine No $ AROM into [place]  2x10           150 degree overhead wall ball   This session                       Ther Ex             TB No $ 3x10 L3 3x10 L3           TB ER 3x10 L3 3x10 L3           TB 60 degree Abd 3x10 L3 3x10 L3           Prone Ys, Ts  2x10 2#           Prone retract with ER  Next session           SL ER, Abd  3x10 2#, 2x10 5#           TB 90/90   This session          TB D2 Flex   This session                                                              Ther Activity             Retro UBE  x10'                        Gait Training                                       Modalities             MHP             CP x10' x10'           US/Stim

## 2021-04-07 ENCOUNTER — OFFICE VISIT (OUTPATIENT)
Dept: PHYSICAL THERAPY | Facility: CLINIC | Age: 61
End: 2021-04-07
Payer: COMMERCIAL

## 2021-04-07 DIAGNOSIS — M75.41 IMPINGEMENT SYNDROME OF RIGHT SHOULDER: Primary | ICD-10-CM

## 2021-04-07 PROCEDURE — 97110 THERAPEUTIC EXERCISES: CPT | Performed by: PHYSICAL THERAPIST

## 2021-04-07 PROCEDURE — 97140 MANUAL THERAPY 1/> REGIONS: CPT | Performed by: PHYSICAL THERAPIST

## 2021-04-07 PROCEDURE — 97112 NEUROMUSCULAR REEDUCATION: CPT | Performed by: PHYSICAL THERAPIST

## 2021-04-07 PROCEDURE — 97530 THERAPEUTIC ACTIVITIES: CPT | Performed by: PHYSICAL THERAPIST

## 2021-04-07 NOTE — PROGRESS NOTES
Daily Note     Today's date: 2021  Patient name: Babar Emmanuel  : 1960  MRN: [id number]  Referring provider: Ubaldo Frazier MD  Dx:   Encounter Diagnosis     ICD-10-CM    1  Impingement syndrome of right shoulder  M75 41                   Subjective: Pt reports HEP going well  No setbacks  Anxious to continue making progress  Objective: See treatment diary below      Assessment: Tolerated treatment well  Patient demonstrated fatigue post treatment, exhibited good technique with therapeutic exercises and would benefit from continued PT      Plan: Continue per plan of care  Progress treatment as tolerated           Precautions: None at this time    Daily Treatment Diary    HEP: Handout provided and discussed      Manuals 3/24/21 3/31/31 4/7/21          ART x15' x15' x15'          PROM/Stretch             IASTM             STM/Triggerpoint             JM                          Neuro Re-Ed             Scap retract 3x10  3x10          No $ AROM 3x10            Prone Scap retraction   Next session          Sleeper Stretch  6x20'' 6x20''          Supine No $ AROM into [place]  2x10 2x10          150 degree overhead wall ball   Next session          Body Blade   3 way 4x20'' ea                                                 Ther Ex             TB No $ 3x10 L3 3x10 L3 3x10 L4          TB ER 3x10 L3 3x10 L3 3x10 L4          TB 60 degree Abd 3x10 L3 3x10 L3 3x10 L4          Prone Ys, Ts  2x10 2# 3x10 2#          Prone retract with ER   Next session          SL ER, Abd  3x10 2#, 2x10 5# 3x10 3#, 5#          TB 90/90   Next session          TB D2 Flex   Next session                                                              Ther Activity             Retro UBE  x10' x10'                       Gait Training                                       Modalities             MHP             CP x10' x10' x10'          US/Stim

## 2021-04-14 ENCOUNTER — OFFICE VISIT (OUTPATIENT)
Dept: PHYSICAL THERAPY | Facility: CLINIC | Age: 61
End: 2021-04-14
Payer: COMMERCIAL

## 2021-04-14 DIAGNOSIS — M75.41 IMPINGEMENT SYNDROME OF RIGHT SHOULDER: Primary | ICD-10-CM

## 2021-04-14 PROCEDURE — 97140 MANUAL THERAPY 1/> REGIONS: CPT | Performed by: PHYSICAL THERAPIST

## 2021-04-14 PROCEDURE — 97530 THERAPEUTIC ACTIVITIES: CPT | Performed by: PHYSICAL THERAPIST

## 2021-04-14 PROCEDURE — 97110 THERAPEUTIC EXERCISES: CPT | Performed by: PHYSICAL THERAPIST

## 2021-04-14 PROCEDURE — 97112 NEUROMUSCULAR REEDUCATION: CPT | Performed by: PHYSICAL THERAPIST

## 2021-04-14 NOTE — PROGRESS NOTES
Daily Note     Today's date: 2021  Patient name: Makayla Pillai  : 1960  MRN: [id number]  Referring provider: Fam Guzmán MD  Dx:   Encounter Diagnosis     ICD-10-CM    1  Impingement syndrome of right shoulder  M75 41                   Subjective: Pt reports HEP going well  No setbacks  Anxious to continue making progress  Objective: See treatment diary below      Assessment: Tolerated treatment well  Patient demonstrated fatigue post treatment, exhibited good technique with therapeutic exercises and would benefit from continued PT      Plan: Continue per plan of care  Progress treatment as tolerated           Precautions: None at this time    Daily Treatment Diary    HEP: Handout provided and discussed      Manuals 3/24/21 3/31/31 4/7/21 4/14/21         ART x15' x15' x15' x15'         PROM/Stretch             IASTM             STM/Triggerpoint             JM                          Neuro Re-Ed             Scap retract 3x10  3x10 3x10         No $ AROM 3x10            Prone Scap retraction   Next session          Sleeper Stretch  6x20'' 6x20'' 6x20''         Supine No $ AROM into [place]  2x10 2x10 2x10         150 degree overhead wall ball   Next session 6x1'         Body Blade   3 way 4x20'' ea 4 way 4x20'' ea                                                Ther Ex             TB No $ 3x10 L3 3x10 L3 3x10 L4 3x10 L4         TB ER 3x10 L3 3x10 L3 3x10 L4 3x10 L4         TB 60 degree Abd 3x10 L3 3x10 L3 3x10 L4 3x10 L4         Prone Ys, Ts  2x10 2# 3x10 2# 3x10 3#         Prone retract with ER   Next session          SL ER, Abd  3x10 2#, 2x10 5# 3x10 3#, 5# 3x10 3#, 5#         TB 90/90   Next session          TB D2 Flex    3x10 L3                                                             Ther Activity             Retro UBE  x10' x10' x10'                      Gait Training                                       Modalities             MHP             CP x10' x10' x10' x10'         US/Stim

## 2021-04-21 ENCOUNTER — OFFICE VISIT (OUTPATIENT)
Dept: PHYSICAL THERAPY | Facility: CLINIC | Age: 61
End: 2021-04-21
Payer: COMMERCIAL

## 2021-04-21 DIAGNOSIS — M75.41 IMPINGEMENT SYNDROME OF RIGHT SHOULDER: Primary | ICD-10-CM

## 2021-04-21 PROCEDURE — 97110 THERAPEUTIC EXERCISES: CPT | Performed by: PHYSICAL THERAPIST

## 2021-04-21 PROCEDURE — 97140 MANUAL THERAPY 1/> REGIONS: CPT | Performed by: PHYSICAL THERAPIST

## 2021-04-21 PROCEDURE — 97530 THERAPEUTIC ACTIVITIES: CPT | Performed by: PHYSICAL THERAPIST

## 2021-04-21 PROCEDURE — 97112 NEUROMUSCULAR REEDUCATION: CPT | Performed by: PHYSICAL THERAPIST

## 2021-04-21 NOTE — PROGRESS NOTES
Daily Note     Today's date: 2021  Patient name: Joselin Lezama  : 1960  MRN: [id number]  Referring provider: Oneida Vallejo MD  Dx:   Encounter Diagnosis     ICD-10-CM    1  Impingement syndrome of right shoulder  M75 41                   Subjective: Pt reports HEP going well  No setbacks  Anxious to continue making progress  Objective: See treatment diary below      Assessment: Tolerated treatment well  Patient demonstrated fatigue post treatment, exhibited good technique with therapeutic exercises and would benefit from continued PT      Plan: Continue per plan of care  Progress treatment as tolerated           Precautions: None at this time    Daily Treatment Diary    HEP: Handout provided and discussed      Manuals 3/24/21 3/31/31 4/7/21 4/14/21 4/21/21        ART x15' x15' x15' x15' x15'        PROM/Stretch             IASTM             STM/Triggerpoint             JM                          Neuro Re-Ed             Scap retract 3x10  3x10 3x10 3x10        No $ AROM 3x10            Prone Scap retraction   Next session  2x10        Sleeper Stretch  6x20'' 6x20'' 6x20'' 6x20''        Supine No $ AROM into [place]  2x10 2x10 2x10 2x10        150 degree overhead wall ball   Next session 6x1' 6x1'        Body Blade   3 way 4x20'' ea 4 way 4x20'' ea 4 way 4x20'' ea                                               Ther Ex             TB No $ 3x10 L3 3x10 L3 3x10 L4 3x10 L4 3x10 L4        TB ER 3x10 L3 3x10 L3 3x10 L4 3x10 L4 3x10 L4        TB 60 degree Abd 3x10 L3 3x10 L3 3x10 L4 3x10 L4 3x10 L4        Prone Ys, Ts  2x10 2# 3x10 2# 3x10 3# 3x10 3#        Prone retract with ER   Next session  3x10 3#        SL ER, Abd  3x10 2#, 2x10 5# 3x10 3#, 5# 3x10 3#, 5# 3x10 3#, 5#        TB 90/90   Next session  3x10 L2        TB D2 Flex    3x10 L3 3x10 L3                                                            Ther Activity             Retro UBE  x10' x10' x10' x10'                     Gait Training Modalities             MHP             CP x10' x10' x10' x10' x10'        US/Stim

## 2021-04-28 ENCOUNTER — OFFICE VISIT (OUTPATIENT)
Dept: PHYSICAL THERAPY | Facility: CLINIC | Age: 61
End: 2021-04-28
Payer: COMMERCIAL

## 2021-04-28 DIAGNOSIS — M75.41 IMPINGEMENT SYNDROME OF RIGHT SHOULDER: Primary | ICD-10-CM

## 2021-04-28 PROCEDURE — 97530 THERAPEUTIC ACTIVITIES: CPT | Performed by: PHYSICAL THERAPIST

## 2021-04-28 PROCEDURE — 97112 NEUROMUSCULAR REEDUCATION: CPT | Performed by: PHYSICAL THERAPIST

## 2021-04-28 PROCEDURE — 97110 THERAPEUTIC EXERCISES: CPT | Performed by: PHYSICAL THERAPIST

## 2021-04-28 PROCEDURE — 97140 MANUAL THERAPY 1/> REGIONS: CPT | Performed by: PHYSICAL THERAPIST

## 2021-04-28 NOTE — PROGRESS NOTES
PT Discharge    Today's date: 2021  Patient name: Laura Ring  : 1960  MRN: [id number]  Referring provider: Javi Garcia MD  Dx:   Encounter Diagnosis     ICD-10-CM    1  Impingement syndrome of right shoulder  M75 40                     Goals  STGs: To be complete within 4 weeks (Met)  - Decrease pain to < 2/10 at worst  - Increase AROM to WNL  - Increase strength to > 4+/5  - Improve postural awareness capacity to > 60min before deficit    LTGs: To be complete within 6 weeks (Met)  - Able to repetitively complete all overhead activity without pain or limitation for increased safety and functional capacity with ADLs and work-related duty  - Able to repetitively complete all reaching activity without pain or limitation for increased safety and functional capacity with ADLs and work-related duty  - Able to repetitively complete all pushing/pulling activity without pain or limitation for increased safety and functional capacity with ADLs and work-related duty  - Able to complete all lifting/carrying activity without pain or limitation for increased safety and functional capacity with ADLs and work-related duty      Pt will be D/C to HEP after today's session, as he has achieved all personal and functional goals  Pt has a good understanding of HEP and has been instructed to reach out with any questions/concerns/setbacks  Subjective Evaluation    Pain  At best pain ratin  At worst pain ratin  Location: R Shoulder      Pt reports he feels ready to safely D/C to HEP after today's session, as he has achieved all personal and functional goals  Pt reports he will reach out with any questions/concerns/setbacks  Objective Pain level ranges 0-1/10  AROM: R Shoulder flexion 180 degrees, Abduction 180 degrees, ER 90 degrees, IR 60 degrees  (PROM in all planes + 10 degrees);  L Shoulder WNL  Strength: R Shoulder Abd 5/5, Flex 5/5, ER 5/5, IR 5/5  Postural Awareness: Fair to good (rounded shoulders, forward head)  Special Tests: (-) Hawkin's,  (-) Empty Can, (-) O'ravi's  FOTO: 92; GOAL: 81  Able to complete overhead activity without pain and limitation  Able to complete pushing/pulling activity without pain and limitation  Able to complete lifting/carrying activity without pain and limitation  Able to complete cross body/behind the back reaching activity without pain and limitation             Precautions: None at this time    Daily Treatment Diary    HEP: Handout provided and discussed      Manuals 3/31/21 4/7/21 4/12/21 4/14/21 4/19/21 4/28/21       ART x15' x20' x20' x20' x15' x15'       PROM/Stretch             IASTM             STM/Triggerpoint             JM                          FOTO  72                        Neuro Re-Ed             Scap retract 3x10 3x10 3x10 3x10 3x10 3x10       AROM No $ 3x10 3x10 3x10 3x10 3x10 3x10       Sleeper Stretch 6x20'' 6x20'' 6x20'' 6x20'' 6x20'' 6x20'       150 degree overhead wall ball 8x40'' 8x45'' 8x45'' 8x25'' 8x25'' 8x35''       Body Blade  2 way 4x20'' ea 2 way 4x20'' ea 2 way 4x20'' ea 2 way 4x20'' ea 2 way 4x20'' ea                                 Ther Ex             TB ER 3x10 L4 3x10 L4 3x10 L4 3x10 L4 3x10 L4 3x10 L4       TB Abd 45 degrees 3x10 L4 3x10 L4 3x10 Le 3x10 L4 3x10 L4 3x10 L4       TB No $ 3x10 L4 3x10 L4 3x10 Le 3x10 L4 3x10 L4 3x10 L4       SL ER, Abd 3x10 3#, 4# 3x10 3#, 4# 3x10 3#, 4# 3x10 3#, 4# 3x10 3#, 4# 3x10 3#, 4#       Prone Ys and Ts 3x10 2# 3x10 2# 3x10 3# 3x10 2# 3x10 2# 3x10 2#       TB Rows 3x10 L6 3x10 L6 3x10 L6 3x10 L6 3x10 L6 3x10 L6       TB LTP 3x10 L3 3x 3x10 L3 3x10 L3 3x10 L3 3x10 L3       90 90   3x10 L2 3x10 L2 3x10 L2 3x10 L2       Table 1/2 pushups   2x10 2x10 2x10 2x10                                                                        Ther Activity             Retro UBE x10' x10' x10' x10' x10' x10'                    Gait Training                                       Modalities             P CP x10' x10' x10' x10' x10' x10'       US/Stim

## 2021-05-14 ENCOUNTER — APPOINTMENT (EMERGENCY)
Dept: CT IMAGING | Facility: HOSPITAL | Age: 61
End: 2021-05-14
Payer: COMMERCIAL

## 2021-05-14 ENCOUNTER — HOSPITAL ENCOUNTER (EMERGENCY)
Facility: HOSPITAL | Age: 61
Discharge: HOME | End: 2021-05-15
Attending: EMERGENCY MEDICINE | Admitting: EMERGENCY MEDICINE
Payer: COMMERCIAL

## 2021-05-14 DIAGNOSIS — S89.92XA INJURY OF LEFT KNEE, INITIAL ENCOUNTER: Primary | ICD-10-CM

## 2021-05-14 DIAGNOSIS — T14.8XXA PUNCTURE WOUND: ICD-10-CM

## 2021-05-14 LAB
ALBUMIN SERPL BCP-MCNC: 3.8 G/DL (ref 3.5–5)
ALP SERPL-CCNC: 92 U/L (ref 46–116)
ALT SERPL W P-5'-P-CCNC: 42 U/L (ref 12–78)
ANION GAP SERPL CALCULATED.3IONS-SCNC: 11 MMOL/L (ref 4–13)
AST SERPL W P-5'-P-CCNC: 28 U/L (ref 5–45)
BASOPHILS # BLD AUTO: 0.1 THOUSANDS/ΜL (ref 0–0.1)
BASOPHILS NFR BLD AUTO: 1 % (ref 0–1)
BILIRUB SERPL-MCNC: 0.5 MG/DL (ref 0.2–1)
BUN SERPL-MCNC: 14 MG/DL (ref 5–25)
CALCIUM SERPL-MCNC: 8.8 MG/DL (ref 8.3–10.1)
CHLORIDE SERPL-SCNC: 105 MMOL/L (ref 100–108)
CO2 SERPL-SCNC: 25 MMOL/L (ref 21–32)
CREAT SERPL-MCNC: 0.98 MG/DL (ref 0.6–1.3)
EOSINOPHIL # BLD AUTO: 0.17 THOUSAND/ΜL (ref 0–0.61)
EOSINOPHIL NFR BLD AUTO: 2 % (ref 0–6)
ERYTHROCYTE [DISTWIDTH] IN BLOOD BY AUTOMATED COUNT: 12.9 % (ref 11.6–15.1)
ERYTHROCYTE [SEDIMENTATION RATE] IN BLOOD: 5 MM/HOUR (ref 0–19)
GFR SERPL CREATININE-BSD FRML MDRD: 83 ML/MIN/1.73SQ M
GLUCOSE SERPL-MCNC: 94 MG/DL (ref 65–140)
HCT VFR BLD AUTO: 43.2 % (ref 36.5–49.3)
HGB BLD-MCNC: 14.7 G/DL (ref 12–17)
IMM GRANULOCYTES # BLD AUTO: 0.02 THOUSAND/UL (ref 0–0.2)
IMM GRANULOCYTES NFR BLD AUTO: 0 % (ref 0–2)
LACTATE SERPL-SCNC: 1.5 MMOL/L (ref 0.5–2)
LYMPHOCYTES # BLD AUTO: 2.12 THOUSANDS/ΜL (ref 0.6–4.47)
LYMPHOCYTES NFR BLD AUTO: 21 % (ref 14–44)
MCH RBC QN AUTO: 31.3 PG (ref 26.8–34.3)
MCHC RBC AUTO-ENTMCNC: 34 G/DL (ref 31.4–37.4)
MCV RBC AUTO: 92 FL (ref 82–98)
MONOCYTES # BLD AUTO: 1.14 THOUSAND/ΜL (ref 0.17–1.22)
MONOCYTES NFR BLD AUTO: 12 % (ref 4–12)
NEUTROPHILS # BLD AUTO: 6.38 THOUSANDS/ΜL (ref 1.85–7.62)
NEUTS SEG NFR BLD AUTO: 64 % (ref 43–75)
NRBC BLD AUTO-RTO: 0 /100 WBCS
PLATELET # BLD AUTO: 178 THOUSANDS/UL (ref 149–390)
PMV BLD AUTO: 10.3 FL (ref 8.9–12.7)
POTASSIUM SERPL-SCNC: 3.5 MMOL/L (ref 3.5–5.3)
PROT SERPL-MCNC: 7.3 G/DL (ref 6.4–8.2)
RBC # BLD AUTO: 4.69 MILLION/UL (ref 3.88–5.62)
SODIUM SERPL-SCNC: 141 MMOL/L (ref 136–145)
WBC # BLD AUTO: 9.93 THOUSAND/UL (ref 4.31–10.16)

## 2021-05-14 PROCEDURE — 36415 COLL VENOUS BLD VENIPUNCTURE: CPT | Performed by: EMERGENCY MEDICINE

## 2021-05-14 PROCEDURE — 99284 EMERGENCY DEPT VISIT MOD MDM: CPT

## 2021-05-14 PROCEDURE — 85652 RBC SED RATE AUTOMATED: CPT | Performed by: EMERGENCY MEDICINE

## 2021-05-14 PROCEDURE — 86140 C-REACTIVE PROTEIN: CPT | Performed by: EMERGENCY MEDICINE

## 2021-05-14 PROCEDURE — 85025 COMPLETE CBC W/AUTO DIFF WBC: CPT | Performed by: EMERGENCY MEDICINE

## 2021-05-14 PROCEDURE — 96374 THER/PROPH/DIAG INJ IV PUSH: CPT

## 2021-05-14 PROCEDURE — 90715 TDAP VACCINE 7 YRS/> IM: CPT | Performed by: EMERGENCY MEDICINE

## 2021-05-14 PROCEDURE — 99284 EMERGENCY DEPT VISIT MOD MDM: CPT | Performed by: EMERGENCY MEDICINE

## 2021-05-14 PROCEDURE — 73700 CT LOWER EXTREMITY W/O DYE: CPT

## 2021-05-14 PROCEDURE — 90471 IMMUNIZATION ADMIN: CPT

## 2021-05-14 PROCEDURE — 80053 COMPREHEN METABOLIC PANEL: CPT | Performed by: EMERGENCY MEDICINE

## 2021-05-14 PROCEDURE — 83605 ASSAY OF LACTIC ACID: CPT | Performed by: EMERGENCY MEDICINE

## 2021-05-14 RX ORDER — IBUPROFEN 800 MG/1
800 TABLET ORAL EVERY 8 HOURS PRN
Qty: 21 TABLET | Refills: 0 | Status: SHIPPED | OUTPATIENT
Start: 2021-05-14 | End: 2022-03-30 | Stop reason: HOSPADM

## 2021-05-14 RX ORDER — KETOROLAC TROMETHAMINE 30 MG/ML
15 INJECTION, SOLUTION INTRAMUSCULAR; INTRAVENOUS ONCE
Status: COMPLETED | OUTPATIENT
Start: 2021-05-14 | End: 2021-05-14

## 2021-05-14 RX ORDER — GINSENG 100 MG
1 CAPSULE ORAL ONCE
Status: DISCONTINUED | OUTPATIENT
Start: 2021-05-15 | End: 2021-05-15 | Stop reason: HOSPADM

## 2021-05-14 RX ORDER — CEPHALEXIN 500 MG/1
500 CAPSULE ORAL EVERY 8 HOURS SCHEDULED
Qty: 15 CAPSULE | Refills: 0 | Status: SHIPPED | OUTPATIENT
Start: 2021-05-14 | End: 2021-05-19

## 2021-05-14 RX ADMIN — KETOROLAC TROMETHAMINE 15 MG: 30 INJECTION, SOLUTION INTRAMUSCULAR; INTRAVENOUS at 23:01

## 2021-05-14 RX ADMIN — TETANUS TOXOID, REDUCED DIPHTHERIA TOXOID AND ACELLULAR PERTUSSIS VACCINE, ADSORBED 0.5 ML: 5; 2.5; 8; 8; 2.5 SUSPENSION INTRAMUSCULAR at 23:10

## 2021-05-15 VITALS
TEMPERATURE: 97.9 F | RESPIRATION RATE: 18 BRPM | BODY MASS INDEX: 31.53 KG/M2 | WEIGHT: 220.24 LBS | SYSTOLIC BLOOD PRESSURE: 138 MMHG | DIASTOLIC BLOOD PRESSURE: 95 MMHG | HEIGHT: 70 IN | HEART RATE: 77 BPM | OXYGEN SATURATION: 98 %

## 2021-05-15 LAB — CRP SERPL QL: <0.5 MG/L

## 2021-05-15 NOTE — ED PROVIDER NOTES
EMERGENCY DEPARTMENT ENCOUNTER NOTE    This note has been generated using a voice recognition software  There may be typographic, grammatic, or word substitution errors that have escaped editorial review  ? CHIEF COMPLAINT  Chief Complaint   Patient presents with    Leg Pain     was moving plywood that had nails in it, it cut his left knee  Knee is not sore and swollen-cannot bend it  Also reports his ankle is swollen  Occured around 230  HPI  Enedina Malik is a [place] 89 y o  male with PMH of hypertension, hyperlipidemia, disease of the thyroid gland, presenting with left knee injury  Patient was lifting a large full-sized piece of ply wood with large nails sticking out of it earlier today around 2:00 p m  when he was struck his left knee  There were large nails nailed into the plywood and 1 of the nails caught patient's left knee  He did not think much of it, however, subsequently found a hole in his jeans and towards the evening, developed a progressively worsening pain, now 8/10, to left knee  Bending the knee is now exceedingly painful  No fevers  No flu-like symptoms  No redness  Patient does feel as though his left ankle is is slightly swollen  He is out of date on tetanus  No history of diabetes  No history of DVT/PE  He is able to bear weight however, knee flexion is painful       REVIEW OF SYSTEMS  Constitutional: ?Denies fevers, chills  CV: No chest pain   Resp: No  shortness of breath or coughing  GI: No abdominal pain  Skin: abrasion to left knee  MSK: Left knee injury as above, some ankle swelling as above, no other injuries or joint pains  Neuro: No headaches  Ten systems were reviewed otherwise were unremarkable    PAST MEDICAL HISTORY  Past Medical History:   Diagnosis Date    Disease of thyroid gland     GERD (gastroesophageal reflux disease)     Hyperlipidemia     Hypertension        SURGICAL HISTORY  Past Surgical History:   Procedure Laterality Date    CARPAL TUNNEL RELEASE      TONSILLECTOMY         FAMILY HISTORY  Family History   Problem Relation Age of Onset    Thyroid disease Mother     Heart attack Father     Heart attack Paternal Grandmother     Heart disease Paternal Grandmother         Pacemaker    Heart disease Paternal Grandfather         CURRENT MEDICATIONS  No current facility-administered medications on file prior to encounter        Current Outpatient Medications on File Prior to Encounter   Medication Sig    atorvastatin (LIPITOR) 20 mg tablet Take 20 mg by mouth daily    candesartan (ATACAND) 16 mg tablet Take 16 mg by mouth daily    ezetimibe (ZETIA) 10 mg tablet Take 10 mg by mouth daily    HYDROcodone-acetaminophen (NORCO) 5-325 mg per tablet Take 1 tablet by mouth every 6 (six) hours as needed for pain    levothyroxine 100 mcg tablet Take 100 mcg by mouth daily    lisinopril (ZESTRIL) 10 mg tablet Take 10 mg by mouth daily    losartan (COZAAR) 50 mg tablet Take 50 mg by mouth    methylprednisolone (MEDROL) 4 mg tablet Medrol dosepak, take as directed (Patient not taking: Reported on 12/27/2019)    pantoprazole (PROTONIX) 40 mg tablet Take 40 mg by mouth daily    rosuvastatin (CRESTOR) 10 MG tablet Take by mouth       ALLERGIES  No Known Allergies    SOCIAL HISTORY  Social History     Socioeconomic History    Marital status: /Civil Union     Spouse name: None    Number of children: None    Years of education: None    Highest education level: None   Occupational History    None   Social Needs    Financial resource strain: None    Food insecurity     Worry: None     Inability: None    Transportation needs     Medical: None     Non-medical: None   Tobacco Use    Smoking status: Never Smoker    Smokeless tobacco: Never Used    Tobacco comment: per [employer]-Former Smoker   Substance and Sexual Activity    Alcohol use: Yes     Comment: socially    Drug use: No     Comment: Denied use    Sexual activity: None   Lifestyle    Physical activity     Days per week: None     Minutes per session: None    Stress: None   Relationships    Social connections     Talks on phone: None     Gets together: None     Attends [employer] service: None     Active member of club or organization: None     Attends meetings of clubs or organizations: None     Relationship status: None    Intimate partner violence     Fear of current or ex partner: None     Emotionally abused: None     Physically abused: None     Forced sexual activity: None   Other Topics Concern    None   Social History Narrative    None       PHYSICAL EXAM    /95 (BP Location: Right arm)   Pulse 77   Temp 97 9 °F (36 6 °C) (Temporal)   Resp 18   Ht 5' 10" (1 778 m)   Wt 99 9 kg (220 lb 3 8 oz)   SpO2 98%   BMI 31 60 kg/m²   Vital signs and nursing notes reviewed    CONSTITUTIONAL: male appearing stated age resting in bed, in no acute distress  HEENT: atraumatic, normocephalic  Sclera anicteric, conjunctiva are not injected  Moist oral mucosa  CARDIOVASCULAR/CHEST: RRR, no M/R/G  2+ radial pulses  PULMONARY: Breathing comfortably on RA  Breath sounds are equal and clear to auscultation  ABDOMEN: non-distended  MSK: Examination of right knee reveals no effusion or ecchymosis  Examination of left knee reveals a superficial abrasion about 1 cm long to inferior aspect of patella  No significant effusion (perhaps mild effusion)  There is tenderness with patella manipulation  Extensor mechanism is intact  Active ROM is limited by pain  There is no skin discoloration, erythema, ecchymosis, red streaks or crepitus  There is no calf asymmetry  There is a subtle edema at left ankle but no tenderness with palpation  Sensation to light touch is intact throughout cutaneous nn distribution  Left foot 2+ DP and PT   NEURO: Awake, alert, and oriented x 3  Face symmetric  Moves all extremities spontaneously  No focal neurologic deficits  SKIN: Warm, appears well-perfused    MENTAL STATUS: Normal affect      ? LABS AND TESTS    Results Reviewed     Procedure Component Value Units Date/Time    C-reactive protein [[id number]]  (Normal) Collected: 05/14/21 2303    Lab Status: Final result Specimen: Blood from Arm, Right Updated: 05/15/21 0002     CRP <0 5 mg/L     Comprehensive metabolic panel [[id number]] Collected: 05/14/21 2300    Lab Status: Final result Specimen: Blood from Arm, Right Updated: 05/14/21 2339     Sodium 141 mmol/L      Potassium 3 5 mmol/L      Chloride 105 mmol/L      CO2 25 mmol/L      ANION GAP 11 mmol/L      BUN 14 mg/dL      Creatinine 0 98 mg/dL      Glucose 94 mg/dL      Calcium 8 8 mg/dL      AST 28 U/L      ALT 42 U/L      Alkaline Phosphatase 92 U/L      Total Protein 7 3 g/dL      Albumin 3 8 g/dL      Total Bilirubin 0 50 mg/dL      eGFR 83 ml/min/1 73sq m     Narrative:      [place] guidelines for Chronic Kidney Disease (CKD):     Stage 1 with normal or high GFR (GFR > 90 mL/min/1 73 square meters)    Stage 2 Mild CKD (GFR = 60-89 mL/min/1 73 square meters)    Stage 3A Moderate CKD (GFR = 45-59 mL/min/1 73 square meters)    Stage 3B Moderate CKD (GFR = 30-44 mL/min/1 73 square meters)    Stage 4 Severe CKD (GFR = 15-29 mL/min/1 73 square meters)    Stage 5 End Stage CKD (GFR <15 mL/min/1 73 square meters)  Note: GFR calculation is accurate only with a steady state creatinine    Lactic acid, plasma [[id number]]  (Normal) Collected: 05/14/21 2300    Lab Status: Final result Specimen: Blood from Arm, Right Updated: 05/14/21 2336     LACTIC ACID 1 5 mmol/L     Narrative:      Result may be elevated if tourniquet was used during collection      Sedimentation rate, automated [[id number]]  (Normal) Collected: 05/14/21 2303    Lab Status: Final result Specimen: Blood from Arm, Right Updated: 05/14/21 2328     Sed Rate 5 mm/hour     CBC and differential [[id number]] Collected: 05/14/21 2300    Lab Status: Final result Specimen: Blood from Arm, Right Updated: 05/14/21 2319     WBC 9 93 Thousand/uL      RBC 4 69 Million/uL      Hemoglobin 14 7 g/dL      Hematocrit 43 2 %      MCV 92 fL      MCH 31 3 pg      MCHC 34 0 g/dL      RDW 12 9 %      MPV 10 3 fL      Platelets 257 Thousands/uL      nRBC 0 /100 WBCs      Neutrophils Relative 64 %      Immat GRANS % 0 %      Lymphocytes Relative 21 %      Monocytes Relative 12 %      Eosinophils Relative 2 %      Basophils Relative 1 %      Neutrophils Absolute 6 38 Thousands/µL      Immature Grans Absolute 0 02 Thousand/uL      Lymphocytes Absolute 2 12 Thousands/µL      Monocytes Absolute 1 14 Thousand/µL      Eosinophils Absolute 0 17 Thousand/µL      Basophils Absolute 0 10 Thousands/µL           CT lower extremity wo contrast left   Final Result by Santi Knox DO (05/14 2319)      No acute bony process is seen  There is a skin marker in the medial infrapatellar region but no discrete cutaneous foreign body is identified      Subcutaneous edema and fat stranding in the prepatellar and infrapatellar regions, this correlates with the site of the patient's injury and could be posttraumatic, inflammatory, or infectious depending on the clinical setting  No discrete subcutaneous    collection is identified  Tiny knee joint effusion  Workstation performed: [id number]             ED COURSE & MEDICAL DECISION MAKING  Procedures             Medications   tetanus-diphtheria-acellular pertussis (BOOSTRIX) IM injection 0 5 mL (0 5 mL Intramuscular Given 5/14/21 2310)   ketorolac (TORADOL) injection 15 mg (15 mg Intravenous Given 5/14/21 211)     59-year-old male presenting with right knee pain after sustaining both a blunt injury as well as a puncture wound to right knee after being struck with drywall with a nail imbedded in it  Vital signs reviewed, afebrile and within normal limits    Differential diagnosis includes pain secondary to abrasion versus puncture wound, including traumatic arthrotomy of the joint, fracture or dislocation, contusion, and, given a somewhat concerning exam with pain out of proportion to exam, possibility of invasive soft tissue infection (necrotizing fasciitis)  Tetanus updated  Toradol administered for pain  Given pain out of proportion to exam, I will be pursuing labs in addition to the imaging of the left knee with plan to rule out fracture and traumatic arthrotomy  Labs reveal unremarkable CMP, lactate, CBC, ESR, and CRP  CT of left knee reveals no acute bony process, revealing no discrete cutaneous foreign body, revealing subcutaneous edema and fat stranding in the prepatellar and infrapatellar regions correlating with the site of patient's injury with differential including posttraumatic, inflammatory, or infectious etiology, no discrete subcutaneous collection identified and a tiny knee joint effusion  There is insufficient effusion for me to attempt arthrocentesis  Patient actually feels quite improved following Toradol  Wound cleansed with chlorhexidine and explored, there does not appear to be a deep puncture component to the wound  I am starting patient on a course of cephalexin for infection prevention given abrasions/puncture with the nail, prescription strength Motrin for pain  I am placing patient in a left knee brace for comfort  Recommend rest, ice, elevation, and compression  Follow-up with orthopedics  Return to emergency department right away if there is progressive erythema, progressive bruising/purple discoloration, severe pain, fevers, and as needed         MDM  Number of Diagnoses or Management Options  Injury of left knee, initial encounter: new and requires workup  Puncture wound: new and requires workup     Amount and/or Complexity of Data Reviewed  Clinical lab tests: ordered and reviewed  Tests in the radiology section of CPT®: ordered and reviewed    Risk of Complications, Morbidity, and/or Mortality  Presenting problems: moderate  Diagnostic procedures: high  Management options: low    Patient Progress  Patient progress: improved      CLINICAL IMPRESSION  Final diagnoses:   Injury of left knee, initial encounter   Puncture wound       DISPOSITION  Time reflects when diagnosis was documented in both MDM as applicable and the Disposition within this note     Time User Action Codes Description Comment    5/14/2021 11:46 PM Stacy Casiano [[id number]] Injury of left knee, initial encounter     5/14/2021 11:55 PM Myron Faria  [id number]] Puncture wound       ED Disposition     ED Disposition Condition Date/Time Comment    Discharge Stable Fri May 14, 2021 11:46 PM Delaney  Demetriaserg discharge to home/self care              Follow-up Information     Follow up With Specialties Details Why Contact Info Additional [id number] [facility] Call on 5/17/2021 To be seen as soon as possible [address] [facility] [phone]  [address] [facility] [place] [address] [facility], [place], [place], [address]    [facility] Emergency Department Emergency Medicine Go to  As needed, If symptoms worsen [place] [phone]  [facility] Emergency Department, [address], [address]   Discharge Medication List as of 5/14/2021 11:57 PM      START taking these medications    Details   cephalexin (KEFLEX) 500 mg capsule Take 1 capsule (500 mg total) by mouth every 8 (eight) hours for 5 days, Starting Fri 5/14/2021, Until Wed 5/19/2021, Normal      ibuprofen (MOTRIN) 800 mg tablet Take 1 tablet (800 mg total) by mouth every 8 (eight) hours as needed for moderate pain, Starting Fri 5/14/2021, Normal         CONTINUE these medications which have NOT CHANGED    Details   atorvastatin (LIPITOR) 20 mg tablet Take 20 mg by mouth daily, Historical Med candesartan (ATACAND) 16 mg tablet Take 16 mg by mouth daily, Historical Med      ezetimibe (ZETIA) 10 mg tablet Take 10 mg by mouth daily, Historical Med      HYDROcodone-acetaminophen (NORCO) 5-325 mg per tablet Take 1 tablet by mouth every 6 (six) hours as needed for pain, Historical Med      levothyroxine 100 mcg tablet Take 100 mcg by mouth daily, Historical Med      lisinopril (ZESTRIL) 10 mg tablet Take 10 mg by mouth daily, Historical Med      losartan (COZAAR) 50 mg tablet Take 50 mg by mouth, Historical Med      methylprednisolone (MEDROL) 4 mg tablet Medrol dosepak, take as directed, Normal      pantoprazole (PROTONIX) 40 mg tablet Take 40 mg by mouth daily, Historical Med      rosuvastatin (CRESTOR) 10 MG tablet Take by mouth, Historical Med             MD Andrews Dunn MD  05/17/21 [id number]

## 2021-05-15 NOTE — DISCHARGE INSTRUCTIONS
Take ibuprofen for pain every 8 hours  Use ice for 10-12 minutes at a time every 3-4 hours to help with pain and swelling  Use knee brace to help with gentle compression  Take cephalexin to help minimize chance of infection from the puncture wound to your left knee  Please follow-up with orthopedics next week for re-evaluation of symptoms  Seek medical attention right away if you are having severe pain, redness or red streaks traveling from the wound, significant bruising around the wound, fevers, or if you are concerned

## 2021-06-08 ENCOUNTER — APPOINTMENT (OUTPATIENT)
Dept: LAB | Facility: MEDICAL CENTER | Age: 61
End: 2021-06-08
Payer: COMMERCIAL

## 2021-06-08 ENCOUNTER — TRANSCRIBE ORDERS (OUTPATIENT)
Dept: LAB | Facility: MEDICAL CENTER | Age: 61
End: 2021-06-08

## 2021-06-08 DIAGNOSIS — E03.9 HYPOTHYROIDISM, UNSPECIFIED TYPE: ICD-10-CM

## 2021-06-08 DIAGNOSIS — E03.9 HYPOTHYROIDISM, UNSPECIFIED TYPE: Primary | ICD-10-CM

## 2021-06-08 LAB
T4 FREE SERPL-MCNC: 0.79 NG/DL (ref 0.76–1.46)
TSH SERPL DL<=0.05 MIU/L-ACNC: 1.1 UIU/ML (ref 0.36–3.74)

## 2021-06-08 PROCEDURE — 84443 ASSAY THYROID STIM HORMONE: CPT

## 2021-06-08 PROCEDURE — 84439 ASSAY OF FREE THYROXINE: CPT

## 2021-06-08 PROCEDURE — 36415 COLL VENOUS BLD VENIPUNCTURE: CPT

## 2022-03-27 ENCOUNTER — HOSPITAL ENCOUNTER (INPATIENT)
Facility: HOSPITAL | Age: 62
LOS: 3 days | Discharge: HOME | DRG: 389 | End: 2022-03-30
Attending: EMERGENCY MEDICINE | Admitting: FAMILY MEDICINE
Payer: COMMERCIAL

## 2022-03-27 ENCOUNTER — OFFICE VISIT (OUTPATIENT)
Dept: URGENT CARE | Facility: MEDICAL CENTER | Age: 62
End: 2022-03-27
Payer: COMMERCIAL

## 2022-03-27 ENCOUNTER — APPOINTMENT (EMERGENCY)
Dept: CT IMAGING | Facility: HOSPITAL | Age: 62
DRG: 389 | End: 2022-03-27
Payer: COMMERCIAL

## 2022-03-27 VITALS
HEART RATE: 92 BPM | OXYGEN SATURATION: 97 % | TEMPERATURE: 98.7 F | DIASTOLIC BLOOD PRESSURE: 82 MMHG | RESPIRATION RATE: 20 BRPM | SYSTOLIC BLOOD PRESSURE: 140 MMHG

## 2022-03-27 DIAGNOSIS — K92.1 BLACK STOOL: ICD-10-CM

## 2022-03-27 DIAGNOSIS — R19.7 NAUSEA VOMITING AND DIARRHEA: ICD-10-CM

## 2022-03-27 DIAGNOSIS — K56.600 PARTIAL SMALL BOWEL OBSTRUCTION (HCC): Primary | ICD-10-CM

## 2022-03-27 DIAGNOSIS — R10.31 RIGHT LOWER QUADRANT ABDOMINAL PAIN: Primary | ICD-10-CM

## 2022-03-27 DIAGNOSIS — N17.9 AKI (ACUTE KIDNEY INJURY) (HCC): ICD-10-CM

## 2022-03-27 DIAGNOSIS — R11.2 NAUSEA VOMITING AND DIARRHEA: ICD-10-CM

## 2022-03-27 PROBLEM — R77.8 ELEVATED TROPONIN: Status: RESOLVED | Noted: 2022-03-27 | Resolved: 2022-03-27

## 2022-03-27 PROBLEM — R79.89 ELEVATED TROPONIN: Status: RESOLVED | Noted: 2022-03-27 | Resolved: 2022-03-27

## 2022-03-27 PROBLEM — E03.9 HYPOTHYROIDISM: Status: ACTIVE | Noted: 2022-03-27

## 2022-03-27 PROBLEM — R79.89 ELEVATED TROPONIN: Status: ACTIVE | Noted: 2022-03-27

## 2022-03-27 PROBLEM — E78.2 MIXED HYPERLIPIDEMIA: Status: ACTIVE | Noted: 2022-03-27

## 2022-03-27 PROBLEM — I10 PRIMARY HYPERTENSION: Status: ACTIVE | Noted: 2022-03-27

## 2022-03-27 PROBLEM — R77.8 ELEVATED TROPONIN: Status: ACTIVE | Noted: 2022-03-27

## 2022-03-27 LAB
2HR DELTA HS TROPONIN: 15 NG/L
4HR DELTA HS TROPONIN: 7 NG/L
ALBUMIN SERPL BCP-MCNC: 3.7 G/DL (ref 3.5–5)
ALP SERPL-CCNC: 70 U/L (ref 46–116)
ALT SERPL W P-5'-P-CCNC: 32 U/L (ref 12–78)
ANION GAP SERPL CALCULATED.3IONS-SCNC: 10 MMOL/L (ref 4–13)
AST SERPL W P-5'-P-CCNC: 20 U/L (ref 5–45)
BASOPHILS # BLD AUTO: 0.04 THOUSANDS/ΜL (ref 0–0.1)
BASOPHILS NFR BLD AUTO: 0 % (ref 0–1)
BILIRUB DIRECT SERPL-MCNC: 0.1 MG/DL (ref 0–0.2)
BILIRUB SERPL-MCNC: 0.65 MG/DL (ref 0.2–1)
BUN SERPL-MCNC: 16 MG/DL (ref 5–25)
CALCIUM SERPL-MCNC: 9 MG/DL (ref 8.3–10.1)
CARDIAC TROPONIN I PNL SERPL HS: 21 NG/L
CARDIAC TROPONIN I PNL SERPL HS: 28 NG/L
CARDIAC TROPONIN I PNL SERPL HS: 36 NG/L
CHLORIDE SERPL-SCNC: 103 MMOL/L (ref 100–108)
CO2 SERPL-SCNC: 28 MMOL/L (ref 21–32)
CREAT SERPL-MCNC: 1.54 MG/DL (ref 0.6–1.3)
EOSINOPHIL # BLD AUTO: 0.09 THOUSAND/ΜL (ref 0–0.61)
EOSINOPHIL NFR BLD AUTO: 1 % (ref 0–6)
ERYTHROCYTE [DISTWIDTH] IN BLOOD BY AUTOMATED COUNT: 13.3 % (ref 11.6–15.1)
FLUAV RNA RESP QL NAA+PROBE: NEGATIVE
FLUBV RNA RESP QL NAA+PROBE: NEGATIVE
GFR SERPL CREATININE-BSD FRML MDRD: 47 ML/MIN/1.73SQ M
GLUCOSE SERPL-MCNC: 114 MG/DL (ref 65–140)
HCT VFR BLD AUTO: 55.8 % (ref 36.5–49.3)
HGB BLD-MCNC: 19 G/DL (ref 12–17)
IMM GRANULOCYTES # BLD AUTO: 0.03 THOUSAND/UL (ref 0–0.2)
IMM GRANULOCYTES NFR BLD AUTO: 0 % (ref 0–2)
LACTATE SERPL-SCNC: 1 MMOL/L (ref 0.5–2)
LACTATE SERPL-SCNC: 2.8 MMOL/L (ref 0.5–2)
LIPASE SERPL-CCNC: 95 U/L (ref 73–393)
LYMPHOCYTES # BLD AUTO: 1.51 THOUSANDS/ΜL (ref 0.6–4.47)
LYMPHOCYTES NFR BLD AUTO: 15 % (ref 14–44)
MCH RBC QN AUTO: 30.5 PG (ref 26.8–34.3)
MCHC RBC AUTO-ENTMCNC: 34.1 G/DL (ref 31.4–37.4)
MCV RBC AUTO: 90 FL (ref 82–98)
MONOCYTES # BLD AUTO: 1.53 THOUSAND/ΜL (ref 0.17–1.22)
MONOCYTES NFR BLD AUTO: 15 % (ref 4–12)
NEUTROPHILS # BLD AUTO: 6.94 THOUSANDS/ΜL (ref 1.85–7.62)
NEUTS SEG NFR BLD AUTO: 69 % (ref 43–75)
NRBC BLD AUTO-RTO: 0 /100 WBCS
NT-PROBNP SERPL-MCNC: 68 PG/ML
PLATELET # BLD AUTO: 167 THOUSANDS/UL (ref 149–390)
PLATELET # BLD AUTO: 167 THOUSANDS/UL (ref 149–390)
PMV BLD AUTO: 11.3 FL (ref 8.9–12.7)
PMV BLD AUTO: 11.5 FL (ref 8.9–12.7)
POTASSIUM SERPL-SCNC: 4.4 MMOL/L (ref 3.5–5.3)
PROT SERPL-MCNC: 7.9 G/DL (ref 6.4–8.2)
RBC # BLD AUTO: 6.23 MILLION/UL (ref 3.88–5.62)
RSV RNA RESP QL NAA+PROBE: NEGATIVE
SARS-COV-2 RNA RESP QL NAA+PROBE: NEGATIVE
SODIUM SERPL-SCNC: 141 MMOL/L (ref 136–145)
WBC # BLD AUTO: 10.14 THOUSAND/UL (ref 4.31–10.16)

## 2022-03-27 PROCEDURE — 99285 EMERGENCY DEPT VISIT HI MDM: CPT | Performed by: EMERGENCY MEDICINE

## 2022-03-27 PROCEDURE — 99223 1ST HOSP IP/OBS HIGH 75: CPT

## 2022-03-27 PROCEDURE — 85025 COMPLETE CBC W/AUTO DIFF WBC: CPT | Performed by: EMERGENCY MEDICINE

## 2022-03-27 PROCEDURE — 83605 ASSAY OF LACTIC ACID: CPT | Performed by: EMERGENCY MEDICINE

## 2022-03-27 PROCEDURE — 96366 THER/PROPH/DIAG IV INF ADDON: CPT

## 2022-03-27 PROCEDURE — 93005 ELECTROCARDIOGRAM TRACING: CPT

## 2022-03-27 PROCEDURE — 96365 THER/PROPH/DIAG IV INF INIT: CPT

## 2022-03-27 PROCEDURE — 99213 OFFICE O/P EST LOW 20 MIN: CPT | Performed by: PHYSICIAN ASSISTANT

## 2022-03-27 PROCEDURE — 83690 ASSAY OF LIPASE: CPT | Performed by: EMERGENCY MEDICINE

## 2022-03-27 PROCEDURE — 74177 CT ABD & PELVIS W/CONTRAST: CPT

## 2022-03-27 PROCEDURE — 80076 HEPATIC FUNCTION PANEL: CPT | Performed by: EMERGENCY MEDICINE

## 2022-03-27 PROCEDURE — 84484 ASSAY OF TROPONIN QUANT: CPT | Performed by: EMERGENCY MEDICINE

## 2022-03-27 PROCEDURE — 83880 ASSAY OF NATRIURETIC PEPTIDE: CPT | Performed by: EMERGENCY MEDICINE

## 2022-03-27 PROCEDURE — 84484 ASSAY OF TROPONIN QUANT: CPT

## 2022-03-27 PROCEDURE — 85049 AUTOMATED PLATELET COUNT: CPT

## 2022-03-27 PROCEDURE — 36415 COLL VENOUS BLD VENIPUNCTURE: CPT | Performed by: EMERGENCY MEDICINE

## 2022-03-27 PROCEDURE — 0241U HB NFCT DS VIR RESP RNA 4 TRGT: CPT | Performed by: EMERGENCY MEDICINE

## 2022-03-27 PROCEDURE — 96375 TX/PRO/DX INJ NEW DRUG ADDON: CPT

## 2022-03-27 PROCEDURE — 99285 EMERGENCY DEPT VISIT HI MDM: CPT

## 2022-03-27 PROCEDURE — G1004 CDSM NDSC: HCPCS

## 2022-03-27 PROCEDURE — 80048 BASIC METABOLIC PNL TOTAL CA: CPT | Performed by: EMERGENCY MEDICINE

## 2022-03-27 RX ORDER — HYDRALAZINE HYDROCHLORIDE 20 MG/ML
5 INJECTION INTRAMUSCULAR; INTRAVENOUS EVERY 6 HOURS SCHEDULED
Status: DISCONTINUED | OUTPATIENT
Start: 2022-03-28 | End: 2022-03-30 | Stop reason: HOSPADM

## 2022-03-27 RX ORDER — HYDROMORPHONE HCL/PF 1 MG/ML
0.5 SYRINGE (ML) INJECTION EVERY 4 HOURS PRN
Status: DISCONTINUED | OUTPATIENT
Start: 2022-03-27 | End: 2022-03-30 | Stop reason: HOSPADM

## 2022-03-27 RX ORDER — HYDRALAZINE HYDROCHLORIDE 20 MG/ML
5 INJECTION INTRAMUSCULAR; INTRAVENOUS EVERY 6 HOURS PRN
Status: DISCONTINUED | OUTPATIENT
Start: 2022-03-27 | End: 2022-03-27

## 2022-03-27 RX ORDER — KETOROLAC TROMETHAMINE 30 MG/ML
15 INJECTION, SOLUTION INTRAMUSCULAR; INTRAVENOUS ONCE
Status: COMPLETED | OUTPATIENT
Start: 2022-03-27 | End: 2022-03-27

## 2022-03-27 RX ORDER — ONDANSETRON 2 MG/ML
4 INJECTION INTRAMUSCULAR; INTRAVENOUS EVERY 6 HOURS PRN
Status: DISCONTINUED | OUTPATIENT
Start: 2022-03-27 | End: 2022-03-30 | Stop reason: HOSPADM

## 2022-03-27 RX ORDER — HYDROMORPHONE HCL/PF 1 MG/ML
1 SYRINGE (ML) INJECTION EVERY 4 HOURS PRN
Status: DISCONTINUED | OUTPATIENT
Start: 2022-03-27 | End: 2022-03-27

## 2022-03-27 RX ORDER — LEVOTHYROXINE SODIUM 0.1 MG/1
100 TABLET ORAL
Status: DISCONTINUED | OUTPATIENT
Start: 2022-03-28 | End: 2022-03-30 | Stop reason: HOSPADM

## 2022-03-27 RX ORDER — SODIUM CHLORIDE, SODIUM GLUCONATE, SODIUM ACETATE, POTASSIUM CHLORIDE, MAGNESIUM CHLORIDE, SODIUM PHOSPHATE, DIBASIC, AND POTASSIUM PHOSPHATE .53; .5; .37; .037; .03; .012; .00082 G/100ML; G/100ML; G/100ML; G/100ML; G/100ML; G/100ML; G/100ML
100 INJECTION, SOLUTION INTRAVENOUS CONTINUOUS
Status: DISCONTINUED | OUTPATIENT
Start: 2022-03-27 | End: 2022-03-29

## 2022-03-27 RX ORDER — HEPARIN SODIUM 5000 [USP'U]/ML
5000 INJECTION, SOLUTION INTRAVENOUS; SUBCUTANEOUS EVERY 8 HOURS SCHEDULED
Status: DISCONTINUED | OUTPATIENT
Start: 2022-03-27 | End: 2022-03-28

## 2022-03-27 RX ORDER — PANTOPRAZOLE SODIUM 40 MG/1
40 INJECTION, POWDER, FOR SOLUTION INTRAVENOUS
Status: DISCONTINUED | OUTPATIENT
Start: 2022-03-28 | End: 2022-03-28

## 2022-03-27 RX ORDER — EZETIMIBE 10 MG/1
10 TABLET ORAL DAILY
Status: DISCONTINUED | OUTPATIENT
Start: 2022-03-28 | End: 2022-03-30 | Stop reason: HOSPADM

## 2022-03-27 RX ORDER — ATORVASTATIN CALCIUM 10 MG/1
20 TABLET, FILM COATED ORAL DAILY
Status: DISCONTINUED | OUTPATIENT
Start: 2022-03-28 | End: 2022-03-30 | Stop reason: HOSPADM

## 2022-03-27 RX ADMIN — SODIUM CHLORIDE, SODIUM LACTATE, POTASSIUM CHLORIDE, AND CALCIUM CHLORIDE 1000 ML: .6; .31; .03; .02 INJECTION, SOLUTION INTRAVENOUS at 15:25

## 2022-03-27 RX ADMIN — KETOROLAC TROMETHAMINE 15 MG: 30 INJECTION, SOLUTION INTRAMUSCULAR at 15:25

## 2022-03-27 RX ADMIN — HEPARIN SODIUM 5000 UNITS: 5000 INJECTION INTRAVENOUS; SUBCUTANEOUS at 22:25

## 2022-03-27 RX ADMIN — SODIUM CHLORIDE, SODIUM GLUCONATE, SODIUM ACETATE, POTASSIUM CHLORIDE, MAGNESIUM CHLORIDE, SODIUM PHOSPHATE, DIBASIC, AND POTASSIUM PHOSPHATE 100 ML/HR: .53; .5; .37; .037; .03; .012; .00082 INJECTION, SOLUTION INTRAVENOUS at 20:10

## 2022-03-27 RX ADMIN — SODIUM CHLORIDE, SODIUM LACTATE, POTASSIUM CHLORIDE, AND CALCIUM CHLORIDE 1000 ML: .6; .31; .03; .02 INJECTION, SOLUTION INTRAVENOUS at 17:54

## 2022-03-27 RX ADMIN — IOHEXOL 100 ML: 350 INJECTION, SOLUTION INTRAVENOUS at 16:30

## 2022-03-27 NOTE — ASSESSMENT & PLAN NOTE
· BP upon admission is 166/98  · Will hold patient's home candesartan due to nephrotoxicity in light of ROSANA upon admission  · May consider restarting candesartan if ROSANA improves  · Hydralazine 5 mg IV q 6 p r n   Systolic Blood pressure above 160  · Will monitor blood pressures with regular vitals checks  · Expect some improvement with pain control

## 2022-03-27 NOTE — ASSESSMENT & PLAN NOTE
· Patient's creatinine was elevated at 1 54 upon admission  · Most likely a pre renal ROSANA due to dehydration secondary to decreased p o  Intake  · Will hold nephrotoxic medications including candesartan  · Will place on Isolyte 100 mL/hr  · Will monitor a m   Metabolic panel for improvement with administration of IV fluids  · May consider restarting candesartan if ROSANA improves overnight

## 2022-03-27 NOTE — PROGRESS NOTES
[address] [facility]        NAME: Nigel Becker is a 57 y o  male  : 1960    MRN: [id number]  DATE: 2022  TIME: 2:34 PM    Assessment and Plan   Right lower quadrant abdominal pain [R10 31]  1  Right lower quadrant abdominal pain  Transfer to other facility   2  Nausea vomiting and diarrhea  Transfer to other facility         Patient Instructions       Follow up with PCP in 3-5 days  Proceed to  ER if symptoms worsen  Chief Complaint     Chief Complaint   Patient presents with    Abdominal Pain     nausea started Friday, Vomiting yesterday, low grade fever last PM            History of Present Illness       67-year-old male presents with abdominal pain nausea vomiting diarrhea  Symptoms started on Friday has been waxing waning since then  Patient has not been able to eat anything and is not hungry  Has had multiple bouts of watery diarrhea last bout was 2 hours ago  Patient reports that he has consistent nausea since Friday that will not relief  Has had some fevers and chills  Denies any issues with urinating  Patient no surgical history on his abdomen  Abdominal Pain  This is a new problem  The current episode started in the past 7 days  The onset quality is sudden  The problem occurs constantly  The problem has been waxing and waning  The pain is located in the RLQ  The pain is moderate  The quality of the pain is aching, sharp and a sensation of fullness  The abdominal pain does not radiate  Associated symptoms include anorexia, diarrhea, a fever, myalgias, nausea and vomiting  Pertinent negatives include no constipation  The pain is aggravated by certain positions and movement  The pain is relieved by nothing  Treatments tried: Pepto-Bismol  The treatment provided no relief  There is no history of abdominal surgery  Review of Systems   Review of Systems   Constitutional: Positive for fever  HENT: Negative  Eyes: Negative  Respiratory: Negative  Cardiovascular: Negative  Gastrointestinal: Positive for abdominal pain, anorexia, diarrhea, nausea and vomiting  Negative for constipation  Musculoskeletal: Positive for myalgias  Skin: Negative  Neurological: Negative            Current Medications       Current Outpatient Medications:     atorvastatin (LIPITOR) 20 mg tablet, Take 20 mg by mouth daily, Disp: , Rfl:     candesartan (ATACAND) 16 mg tablet, Take 16 mg by mouth daily, Disp: , Rfl:     ezetimibe (ZETIA) 10 mg tablet, Take 10 mg by mouth daily, Disp: , Rfl:     levothyroxine 100 mcg tablet, Take 100 mcg by mouth daily, Disp: , Rfl:     pantoprazole (PROTONIX) 40 mg tablet, Take 40 mg by mouth daily, Disp: , Rfl:     HYDROcodone-acetaminophen (NORCO) 5-325 mg per tablet, Take 1 tablet by mouth every 6 (six) hours as needed for pain (Patient not taking: Reported on 3/27/2022 ), Disp: , Rfl:     ibuprofen (MOTRIN) 800 mg tablet, Take 1 tablet (800 mg total) by mouth every 8 (eight) hours as needed for moderate pain (Patient not taking: Reported on 3/27/2022 ), Disp: 21 tablet, Rfl: 0    lisinopril (ZESTRIL) 10 mg tablet, Take 10 mg by mouth daily (Patient not taking: Reported on 3/27/2022 ), Disp: , Rfl:     losartan (COZAAR) 50 mg tablet, Take 50 mg by mouth (Patient not taking: Reported on 3/27/2022 ), Disp: , Rfl:     methylprednisolone (MEDROL) 4 mg tablet, Medrol dosepak, take as directed (Patient not taking: Reported on 12/27/2019), Disp: 21 tablet, Rfl: 0    rosuvastatin (CRESTOR) 10 MG tablet, Take by mouth (Patient not taking: Reported on 3/27/2022 ), Disp: , Rfl:     Current Allergies     Allergies as of 03/27/2022    (No Known Allergies)            The following portions of the patient's history were reviewed and updated as appropriate: allergies, current medications, past family history, past medical history, past social history, past surgical history and problem list      Past Medical History:   Diagnosis Date    Disease of thyroid gland     GERD (gastroesophageal reflux disease)     Hyperlipidemia     Hypertension        Past Surgical History:   Procedure Laterality Date    CARPAL TUNNEL RELEASE      TONSILLECTOMY         Family History   Problem Relation Age of Onset    Thyroid disease Mother     Heart attack Father     Heart attack Paternal Grandmother     Heart disease Paternal Grandmother         Pacemaker    Heart disease Paternal Grandfather          Medications have been verified  Objective   /82   Pulse 92   Temp 98 7 °F (37 1 °C)   Resp 20   SpO2 97%   No LMP for male patient  Physical Exam     Physical Exam  Vitals and nursing note reviewed  Constitutional:       General: He is not in acute distress  Appearance: Normal appearance  He is well-developed  HENT:      Head: Normocephalic and atraumatic  Right Ear: Hearing, tympanic membrane, ear canal and external ear normal  There is no impacted cerumen  Left Ear: Hearing, tympanic membrane, ear canal and external ear normal  There is no impacted cerumen  Nose: Nose normal       Mouth/Throat:      Pharynx: Uvula midline  No oropharyngeal exudate  Eyes:      General:         Right eye: No discharge  Left eye: No discharge  Conjunctiva/sclera: Conjunctivae normal    Cardiovascular:      Rate and Rhythm: Normal rate and regular rhythm  Heart sounds: Normal heart sounds  No murmur heard  Pulmonary:      Effort: Pulmonary effort is normal  No respiratory distress  Breath sounds: Normal breath sounds  No wheezing or rales  Abdominal:      General: Abdomen is flat  Bowel sounds are normal       Palpations: Abdomen is soft  Tenderness: There is abdominal tenderness (Moderate) in the right lower quadrant  There is guarding and rebound  There is no right CVA tenderness or left CVA tenderness  Positive signs include McBurney's sign   Negative signs include Olsen's sign and Rovsing's sign    Musculoskeletal:         General: Normal range of motion  Cervical back: Normal range of motion and neck supple  Lymphadenopathy:      Cervical: No cervical adenopathy  Skin:     General: Skin is warm and dry  Neurological:      Mental Status: He is alert and oriented to person, place, and time     Psychiatric:         Mood and Affect: Mood normal

## 2022-03-27 NOTE — ED PROVIDER NOTES
History  Chief Complaint   Patient presents with    Abdominal Pain     RUQ and LUQ pain since friday, associated n/v/d  Denies fever  History provided by:  Patient and medical records  Abdominal Pain  Pain location:  Generalized  Pain quality: aching and bloating    Pain radiates to:  Does not radiate  Pain severity:  Mild  Onset quality:  Gradual  Duration:  3 days  Timing:  Intermittent  Progression:  Unchanged  Chronicity:  New  Context: eating    Context: not alcohol use, not awakening from sleep, not medication withdrawal, not previous surgeries, not recent illness, not retching, not sick contacts and not suspicious food intake    Relieved by:  Nothing  Worsened by:  Eating  Ineffective treatments:  Vomiting, position changes, not moving, lying down, liquids, bowel activity, belching and flatus  Associated symptoms: anorexia, chills, diarrhea, fever, flatus, nausea and vomiting    Associated symptoms: no belching, no chest pain, no constipation, no cough, no dysuria, no fatigue, no hematemesis, no hematochezia, no hematuria, no shortness of breath and no sore throat    Risk factors: obesity    Risk factors: not elderly, has not had multiple surgeries and no recent hospitalization        Prior to Admission Medications   Prescriptions Last Dose Informant Patient Reported? Taking?    HYDROcodone-acetaminophen (NORCO) 5-325 mg per tablet   Yes No   Sig: Take 1 tablet by mouth every 6 (six) hours as needed for pain   Patient not taking: Reported on 3/27/2022    atorvastatin (LIPITOR) 20 mg tablet   Yes No   Sig: Take 20 mg by mouth daily   candesartan (ATACAND) 16 mg tablet   Yes No   Sig: Take 16 mg by mouth daily   ezetimibe (ZETIA) 10 mg tablet   Yes No   Sig: Take 10 mg by mouth daily   ibuprofen (MOTRIN) 800 mg tablet   No No   Sig: Take 1 tablet (800 mg total) by mouth every 8 (eight) hours as needed for moderate pain   Patient not taking: Reported on 3/27/2022    levothyroxine 100 mcg tablet   Yes No Sig: Take 100 mcg by mouth daily   lisinopril (ZESTRIL) 10 mg tablet   Yes No   Sig: Take 10 mg by mouth daily   Patient not taking: Reported on 3/27/2022    losartan (COZAAR) 50 mg tablet   Yes No   Sig: Take 50 mg by mouth   Patient not taking: Reported on 3/27/2022    methylprednisolone (MEDROL) 4 mg tablet   No No   Sig: Medrol dosepak, take as directed   Patient not taking: Reported on 12/27/2019   pantoprazole (PROTONIX) 40 mg tablet   Yes No   Sig: Take 40 mg by mouth daily   rosuvastatin (CRESTOR) 10 MG tablet   Yes No   Sig: Take by mouth   Patient not taking: Reported on 3/27/2022       Facility-Administered Medications: None       Past Medical History:   Diagnosis Date    Disease of thyroid gland     GERD (gastroesophageal reflux disease)     Hyperlipidemia     Hypertension        Past Surgical History:   Procedure Laterality Date    CARPAL TUNNEL RELEASE      TONSILLECTOMY         Family History   Problem Relation Age of Onset    Thyroid disease Mother     Heart attack Father     Heart attack Paternal Grandmother     Heart disease Paternal Grandmother         Pacemaker    Heart disease Paternal Grandfather      I have reviewed and agree with the history as documented  E-Cigarette/Vaping    E-Cigarette Use Never User      E-Cigarette/Vaping Substances    Nicotine No     THC No     CBD No     Flavoring No     Other No     Unknown No      Social History     Tobacco Use    Smoking status: Never Smoker    Smokeless tobacco: Never Used    Tobacco comment: per [employer]-Former Smoker   Vaping Use    Vaping Use: Never used   Substance Use Topics    Alcohol use: Yes     Comment: socially    Drug use: No     Comment: Denied use       Review of Systems   Constitutional: Positive for chills and fever  Negative for fatigue  HENT: Negative for ear pain and sore throat  Eyes: Negative for pain and visual disturbance  Respiratory: Negative for cough and shortness of breath  Cardiovascular: Negative for chest pain and palpitations  Gastrointestinal: Positive for abdominal pain, anorexia, diarrhea, flatus, nausea and vomiting  Negative for constipation, hematemesis and hematochezia  Genitourinary: Negative for dysuria and hematuria  Musculoskeletal: Negative for arthralgias and back pain  Skin: Negative for color change and rash  Neurological: Negative for seizures and syncope  All other systems reviewed and are negative  Physical Exam  Physical Exam  Vitals and nursing note reviewed  Exam conducted with a chaperone present  Constitutional:       Appearance: He is well-developed and normal weight  HENT:      Head: Normocephalic and atraumatic  Mouth/Throat:      Mouth: Mucous membranes are moist       Pharynx: Oropharynx is clear  Eyes:      General: No scleral icterus  Cardiovascular:      Rate and Rhythm: Normal rate and regular rhythm  Heart sounds: Normal heart sounds  No murmur heard  Pulmonary:      Effort: Pulmonary effort is normal  No respiratory distress  Breath sounds: Normal breath sounds  No wheezing or rales  Abdominal:      General: There is distension  Palpations: Abdomen is soft  Tenderness: There is generalized abdominal tenderness  There is no right CVA tenderness, left CVA tenderness, guarding or rebound  Skin:     General: Skin is warm and dry  Capillary Refill: Capillary refill takes less than 2 seconds  Neurological:      General: No focal deficit present  Mental Status: He is oriented to person, place, and time           Vital Signs  ED Triage Vitals [03/27/22 1505]   Temperature Pulse Respirations Blood Pressure SpO2   98 2 °F (36 8 °C) 90 14 125/78 96 %      Temp Source Heart Rate Source Patient Position - Orthostatic VS BP Location FiO2 (%)   Tympanic Monitor Sitting Right arm --      Pain Score       1           Vitals:    03/27/22 1505 03/27/22 1730   BP: 125/78 166/98   Pulse: 90 79 Patient Position - Orthostatic VS: Sitting Sitting         Visual Acuity      ED Medications  Medications   lactated ringers bolus 1,000 mL (0 mL Intravenous Stopped 3/27/22 1748)   ketorolac (TORADOL) injection 15 mg (15 mg Intravenous Given 3/27/22 1525)   iohexol (OMNIPAQUE) 350 MG/ML injection (SINGLE-DOSE) 100 mL (100 mL Intravenous Given 3/27/22 1630)   lactated ringers bolus 1,000 mL (1,000 mL Intravenous New Bag 3/27/22 1754)       Diagnostic Studies  Results Reviewed     Procedure Component Value Units Date/Time    HS Troponin I 2hr [[id number]]  (Normal) Collected: 03/27/22 1748    Lab Status: Final result Specimen: Blood from Arm, Left Updated: 03/27/22 1817     hs TnI 2hr 36 ng/L      Delta 2hr hsTnI 15 ng/L     Lactic acid 2 Hours [[id number]]  (Normal) Collected: 03/27/22 1748    Lab Status: Final result Specimen: Blood from Arm, Left Updated: 03/27/22 1812     LACTIC ACID 1 0 mmol/L     Narrative:      Result may be elevated if tourniquet was used during collection  COVID/FLU/RSV - 2 hour TAT [[id number]]  (Normal) Collected: 03/27/22 1526    Lab Status: Final result Specimen: Nasopharyngeal Swab Updated: 03/27/22 1620     SARS-CoV-2 Negative     INFLUENZA A PCR Negative     INFLUENZA B PCR Negative     RSV PCR Negative    Narrative:      FOR PEDIATRIC PATIENTS - copy/paste COVID Guidelines URL to browser: [email or web address]    SARS-CoV-2 assay is a Nucleic Acid Amplification assay intended for the  qualitative detection of nucleic acid from SARS-CoV-2 in nasopharyngeal  swabs  Results are for the presumptive identification of SARS-CoV-2 RNA  Positive results are indicative of infection with SARS-CoV-2, the virus  causing COVID-19, but do not rule out bacterial infection or co-infection  with other viruses   Laboratories within the [place] and its  territories are required to report all positive results to the appropriate  public health authorities  Negative results do not preclude SARS-CoV-2  infection and should not be used as the sole basis for treatment or other  patient management decisions  Negative results must be combined with  clinical observations, patient history, and epidemiological information  This test has not been FDA cleared or approved  This test has been authorized by FDA under an Emergency Use Authorization  (EUA)  This test is only authorized for the duration of time the  declaration that circumstances exist justifying the authorization of the  emergency use of an in vitro diagnostic tests for detection of SARS-CoV-2  virus and/or diagnosis of COVID-19 infection under section 564(b)(1) of  the Act, 21 U  S C  744MJB-3(I)(2), unless the authorization is terminated  or revoked sooner  The test has been validated but independent review by FDA  and CLIA is pending  Test performed using [employer] Gene[employer]pert: This RT-PCR assay targets N2,  a region unique to SARS-CoV-2  A conserved region in the E-gene was chosen  for pan-Sarbecovirus detection which includes SARS-CoV-2  Lactic acid [[id number]]  (Abnormal) Collected: 03/27/22 1526    Lab Status: Final result Specimen: Blood from Arm, Left Updated: 03/27/22 1605     LACTIC ACID 2 8 mmol/L     Narrative:      Result may be elevated if tourniquet was used during collection      HS Troponin I 4hr [[id number]]     Lab Status: No result Specimen: Blood     HS Troponin 0hr (reflex protocol) [[id number]]  (Normal) Collected: 03/27/22 1526    Lab Status: Final result Specimen: Blood from Arm, Left Updated: 03/27/22 1603     hs TnI 0hr 21 ng/L     NT-BNP PRO [[id number]]  (Normal) Collected: 03/27/22 1526    Lab Status: Final result Specimen: Blood from Arm, Left Updated: 03/27/22 1603     NT-proBNP 68 pg/mL     Hepatic function panel [[id number]]  (Normal) Collected: 03/27/22 1526    Lab Status: Final result Specimen: Blood from Arm, Left Updated: 03/27/22 1603     Total Bilirubin 0 65 mg/dL Bilirubin, Direct 0 10 mg/dL      Alkaline Phosphatase 70 U/L      AST 20 U/L      ALT 32 U/L      Total Protein 7 9 g/dL      Albumin 3 7 g/dL     Lipase [[id number]]  (Normal) Collected: 03/27/22 1526    Lab Status: Final result Specimen: Blood from Arm, Left Updated: 03/27/22 1603     Lipase 95 u/L     Basic metabolic panel [[id number]]  (Abnormal) Collected: 03/27/22 1526    Lab Status: Final result Specimen: Blood from Arm, Left Updated: 03/27/22 1556     Sodium 141 mmol/L      Potassium 4 4 mmol/L      Chloride 103 mmol/L      CO2 28 mmol/L      ANION GAP 10 mmol/L      BUN 16 mg/dL      Creatinine 1 54 mg/dL      Glucose 114 mg/dL      Calcium 9 0 mg/dL      eGFR 47 ml/min/1 73sq m     Narrative:      [place] guidelines for Chronic Kidney Disease (CKD):     Stage 1 with normal or high GFR (GFR > 90 mL/min/1 73 square meters)    Stage 2 Mild CKD (GFR = 60-89 mL/min/1 73 square meters)    Stage 3A Moderate CKD (GFR = 45-59 mL/min/1 73 square meters)    Stage 3B Moderate CKD (GFR = 30-44 mL/min/1 73 square meters)    Stage 4 Severe CKD (GFR = 15-29 mL/min/1 73 square meters)    Stage 5 End Stage CKD (GFR <15 mL/min/1 73 square meters)  Note: GFR calculation is accurate only with a steady state creatinine    CBC and differential [[id number]]  (Abnormal) Collected: 03/27/22 1526    Lab Status: Final result Specimen: Blood from Arm, Left Updated: 03/27/22 1553     WBC 10 14 Thousand/uL      RBC 6 23 Million/uL      Hemoglobin 19 0 g/dL      Hematocrit 55 8 %      MCV 90 fL      MCH 30 5 pg      MCHC 34 1 g/dL      RDW 13 3 %      MPV 11 3 fL      Platelets 409 Thousands/uL      nRBC 0 /100 WBCs      Neutrophils Relative 69 %      Immat GRANS % 0 %      Lymphocytes Relative 15 %      Monocytes Relative 15 %      Eosinophils Relative 1 %      Basophils Relative 0 %      Neutrophils Absolute 6 94 Thousands/µL      Immature Grans Absolute 0 03 Thousand/uL      Lymphocytes Absolute 1 51 Thousands/µL      Monocytes Absolute 1 53 Thousand/µL      Eosinophils Absolute 0 09 Thousand/µL      Basophils Absolute 0 04 Thousands/µL     UA w Reflex to Microscopic w Reflex to Culture [[id number]]     Lab Status: No result Specimen: Urine, Clean Catch                  CT abdomen pelvis with contrast   Final Result by Roderick Steele MD (03/27 1729)         1  Multiple loops of mild-moderate small bowel distention involving the jejunum and proximal ileum concerning for partial small bowel obstruction as described above with slight transition point in the left mid abdomen  2   Diverticulosis without diverticulitis  3   Nonobstructing 11 mm right lower pole renal calculus  4   Additional findings as noted  The study was marked in [facility] for immediate notification  Workstation performed: [id number]                    Procedures  Procedures         ED Course  ED Course as of 03/27/22 1904   Bienvenido Borjas Mar 27, 2022   1721 EKG interpreted by myself  EKG dated 03/27/2022 at 3:26 p m  Demonstrates normal sinus rhythm 84 beats per minute, normal NE interval, normal QRS interval, normal QTC interval, rightward axis, age indeterminate infarct cited, no prior EKG for comparison  No STEMI  1755 Discussed findings with patient at this time  Discussing case with general surgeon on-call Dr Cat Avendano for recs  Medical mgmt vs surgical intervention, anticipate admission  1821 LACTIC ACID: 1 0   1822 hs TnI 2hr: 36   1822 Delta 2hr hsTnI: 15  No CP will obtain repeat ekg     1901 Spoke with Cat Avendano, will place NGT, admit to SLIM, d/w admitting provider                                                MDM  Number of Diagnoses or Management Options  ROSANA (acute kidney injury) [facility]): new and requires workup  Partial small bowel obstruction [facility]): new and requires workup     Amount and/or Complexity of Data Reviewed  Clinical lab tests: ordered and reviewed  Tests in the radiology section of CPT®: ordered and reviewed  Tests in the medicine section of CPT®: ordered and reviewed  Decide to obtain previous medical records or to obtain history from someone other than the patient: yes  Obtain history from someone other than the patient: yes  Review and summarize past medical records: yes  Discuss the patient with other providers: yes  Independent visualization of images, tracings, or specimens: yes    Risk of Complications, Morbidity, and/or Mortality  Presenting problems: moderate  Diagnostic procedures: moderate  Management options: moderate    Patient Progress  Patient progress: stable      Disposition  Final diagnoses:   Partial small bowel obstruction ([facility] [place] [address] )   ROSANA (acute kidney injury) ([facility] [place] [address] )     Time reflects when diagnosis was documented in both MDM as applicable and the Disposition within this note     Time User Action Codes Description Comment    3/27/2022  6:11 PM Ofelia Mo Add [R11 2] Nausea and vomiting     3/27/2022  6:11 PM Paulette Seay [R11 2] Nausea and vomiting     3/27/2022  6:11 PM Ofelia Mo Add [K56 600] Partial small bowel obstruction ([facility] [place] [address] )     3/27/2022  6:11 PM Ofelia Mo Add [N17 9] ROSANA (acute kidney injury) [facility])       ED Disposition     ED Disposition Condition Date/Time Comment    Admit Stable Sun Mar 27, 2022  7:03 PM Case was discussed with AKBAR and the patient's admission status was agreed to be Admission Status: inpatient status to the service of Dr Skye Dickerson   Follow-up Information    None         Patient's Medications   Discharge Prescriptions    No medications on file       No discharge procedures on file      PDMP Review     None          ED Provider  Electronically Signed by           Steven Bangura DO  03/27/22 0906

## 2022-03-28 ENCOUNTER — APPOINTMENT (INPATIENT)
Dept: RADIOLOGY | Facility: HOSPITAL | Age: 62
DRG: 389 | End: 2022-03-28
Payer: COMMERCIAL

## 2022-03-28 LAB
ALBUMIN SERPL BCP-MCNC: 3 G/DL (ref 3.5–5)
ALP SERPL-CCNC: 56 U/L (ref 46–116)
ALT SERPL W P-5'-P-CCNC: 23 U/L (ref 12–78)
ANION GAP SERPL CALCULATED.3IONS-SCNC: 10 MMOL/L (ref 4–13)
APTT PPP: 32 SECONDS (ref 23–37)
AST SERPL W P-5'-P-CCNC: 17 U/L (ref 5–45)
ATRIAL RATE: 82 BPM
ATRIAL RATE: 83 BPM
BACTERIA UR QL AUTO: NORMAL /HPF
BASOPHILS # BLD AUTO: 0.03 THOUSANDS/ΜL (ref 0–0.1)
BASOPHILS NFR BLD AUTO: 0 % (ref 0–1)
BILIRUB SERPL-MCNC: 0.72 MG/DL (ref 0.2–1)
BILIRUB UR QL STRIP: NEGATIVE
BUN SERPL-MCNC: 14 MG/DL (ref 5–25)
CALCIUM ALBUM COR SERPL-MCNC: 9 MG/DL (ref 8.3–10.1)
CALCIUM SERPL-MCNC: 8.2 MG/DL (ref 8.3–10.1)
CHLORIDE SERPL-SCNC: 104 MMOL/L (ref 100–108)
CLARITY UR: CLEAR
CO2 SERPL-SCNC: 25 MMOL/L (ref 21–32)
COLOR UR: YELLOW
CREAT SERPL-MCNC: 1.13 MG/DL (ref 0.6–1.3)
EOSINOPHIL # BLD AUTO: 0.18 THOUSAND/ΜL (ref 0–0.61)
EOSINOPHIL NFR BLD AUTO: 2 % (ref 0–6)
ERYTHROCYTE [DISTWIDTH] IN BLOOD BY AUTOMATED COUNT: 13.4 % (ref 11.6–15.1)
GFR SERPL CREATININE-BSD FRML MDRD: 69 ML/MIN/1.73SQ M
GLUCOSE SERPL-MCNC: 97 MG/DL (ref 65–140)
GLUCOSE UR STRIP-MCNC: NEGATIVE MG/DL
HCT VFR BLD AUTO: 50.4 % (ref 36.5–49.3)
HGB BLD-MCNC: 16.8 G/DL (ref 12–17)
HGB UR QL STRIP.AUTO: ABNORMAL
IMM GRANULOCYTES # BLD AUTO: 0.01 THOUSAND/UL (ref 0–0.2)
IMM GRANULOCYTES NFR BLD AUTO: 0 % (ref 0–2)
KETONES UR STRIP-MCNC: ABNORMAL MG/DL
LEUKOCYTE ESTERASE UR QL STRIP: NEGATIVE
LYMPHOCYTES # BLD AUTO: 1.54 THOUSANDS/ΜL (ref 0.6–4.47)
LYMPHOCYTES NFR BLD AUTO: 18 % (ref 14–44)
MCH RBC QN AUTO: 30.5 PG (ref 26.8–34.3)
MCHC RBC AUTO-ENTMCNC: 33.3 G/DL (ref 31.4–37.4)
MCV RBC AUTO: 92 FL (ref 82–98)
MONOCYTES # BLD AUTO: 1.24 THOUSAND/ΜL (ref 0.17–1.22)
MONOCYTES NFR BLD AUTO: 15 % (ref 4–12)
NEUTROPHILS # BLD AUTO: 5.42 THOUSANDS/ΜL (ref 1.85–7.62)
NEUTS SEG NFR BLD AUTO: 65 % (ref 43–75)
NITRITE UR QL STRIP: NEGATIVE
NON-SQ EPI CELLS URNS QL MICRO: NORMAL /HPF
NRBC BLD AUTO-RTO: 0 /100 WBCS
P AXIS: -1 DEGREES
P AXIS: 34 DEGREES
PH UR STRIP.AUTO: 6.5 [PH]
PLATELET # BLD AUTO: 186 THOUSANDS/UL (ref 149–390)
PMV BLD AUTO: 10.8 FL (ref 8.9–12.7)
POTASSIUM SERPL-SCNC: 3.9 MMOL/L (ref 3.5–5.3)
PR INTERVAL: 200 MS
PR INTERVAL: 206 MS
PROT SERPL-MCNC: 6.6 G/DL (ref 6.4–8.2)
PROT UR STRIP-MCNC: ABNORMAL MG/DL
QRS AXIS: -71 DEGREES
QRS AXIS: 267 DEGREES
QRSD INTERVAL: 62 MS
QRSD INTERVAL: 96 MS
QT INTERVAL: 334 MS
QT INTERVAL: 350 MS
QTC INTERVAL: 390 MS
QTC INTERVAL: 411 MS
RBC # BLD AUTO: 5.5 MILLION/UL (ref 3.88–5.62)
RBC #/AREA URNS AUTO: NORMAL /HPF
SODIUM SERPL-SCNC: 139 MMOL/L (ref 136–145)
SP GR UR STRIP.AUTO: 1.02 (ref 1–1.03)
T WAVE AXIS: 25 DEGREES
T WAVE AXIS: 36 DEGREES
TSH SERPL DL<=0.05 MIU/L-ACNC: 3.37 UIU/ML (ref 0.36–3.74)
UROBILINOGEN UR QL STRIP.AUTO: 0.2 E.U./DL
VENTRICULAR RATE: 82 BPM
VENTRICULAR RATE: 83 BPM
WBC # BLD AUTO: 8.42 THOUSAND/UL (ref 4.31–10.16)
WBC #/AREA URNS AUTO: NORMAL /HPF

## 2022-03-28 PROCEDURE — 99233 SBSQ HOSP IP/OBS HIGH 50: CPT | Performed by: INTERNAL MEDICINE

## 2022-03-28 PROCEDURE — 74022 RADEX COMPL AQT ABD SERIES: CPT

## 2022-03-28 PROCEDURE — 85025 COMPLETE CBC W/AUTO DIFF WBC: CPT

## 2022-03-28 PROCEDURE — 93010 ELECTROCARDIOGRAM REPORT: CPT | Performed by: INTERNAL MEDICINE

## 2022-03-28 PROCEDURE — 85730 THROMBOPLASTIN TIME PARTIAL: CPT

## 2022-03-28 PROCEDURE — 81001 URINALYSIS AUTO W/SCOPE: CPT

## 2022-03-28 PROCEDURE — 84443 ASSAY THYROID STIM HORMONE: CPT

## 2022-03-28 PROCEDURE — C9113 INJ PANTOPRAZOLE SODIUM, VIA: HCPCS

## 2022-03-28 PROCEDURE — NC001 PR NO CHARGE

## 2022-03-28 PROCEDURE — 99222 1ST HOSP IP/OBS MODERATE 55: CPT | Performed by: SURGERY

## 2022-03-28 PROCEDURE — 80053 COMPREHEN METABOLIC PANEL: CPT

## 2022-03-28 RX ORDER — PANTOPRAZOLE SODIUM 40 MG/1
40 INJECTION, POWDER, FOR SOLUTION INTRAVENOUS EVERY 12 HOURS SCHEDULED
Status: DISCONTINUED | OUTPATIENT
Start: 2022-03-28 | End: 2022-03-30 | Stop reason: HOSPADM

## 2022-03-28 RX ADMIN — SODIUM CHLORIDE, SODIUM GLUCONATE, SODIUM ACETATE, POTASSIUM CHLORIDE, MAGNESIUM CHLORIDE, SODIUM PHOSPHATE, DIBASIC, AND POTASSIUM PHOSPHATE 100 ML/HR: .53; .5; .37; .037; .03; .012; .00082 INJECTION, SOLUTION INTRAVENOUS at 08:41

## 2022-03-28 RX ADMIN — HYDRALAZINE HYDROCHLORIDE 5 MG: 20 INJECTION, SOLUTION INTRAMUSCULAR; INTRAVENOUS at 23:27

## 2022-03-28 RX ADMIN — PANTOPRAZOLE SODIUM 40 MG: 40 INJECTION, POWDER, FOR SOLUTION INTRAVENOUS at 23:27

## 2022-03-28 RX ADMIN — HEPARIN SODIUM 5000 UNITS: 5000 INJECTION INTRAVENOUS; SUBCUTANEOUS at 06:22

## 2022-03-28 RX ADMIN — SODIUM CHLORIDE, SODIUM GLUCONATE, SODIUM ACETATE, POTASSIUM CHLORIDE, MAGNESIUM CHLORIDE, SODIUM PHOSPHATE, DIBASIC, AND POTASSIUM PHOSPHATE 100 ML/HR: .53; .5; .37; .037; .03; .012; .00082 INJECTION, SOLUTION INTRAVENOUS at 23:33

## 2022-03-28 RX ADMIN — PANTOPRAZOLE SODIUM 40 MG: 40 INJECTION, POWDER, FOR SOLUTION INTRAVENOUS at 08:39

## 2022-03-28 RX ADMIN — HYDRALAZINE HYDROCHLORIDE 5 MG: 20 INJECTION, SOLUTION INTRAMUSCULAR; INTRAVENOUS at 00:16

## 2022-03-28 RX ADMIN — HYDRALAZINE HYDROCHLORIDE 5 MG: 20 INJECTION, SOLUTION INTRAMUSCULAR; INTRAVENOUS at 17:24

## 2022-03-28 RX ADMIN — HYDRALAZINE HYDROCHLORIDE 5 MG: 20 INJECTION, SOLUTION INTRAMUSCULAR; INTRAVENOUS at 06:24

## 2022-03-28 RX ADMIN — HEPARIN SODIUM 5000 UNITS: 5000 INJECTION INTRAVENOUS; SUBCUTANEOUS at 15:00

## 2022-03-28 RX ADMIN — HYDRALAZINE HYDROCHLORIDE 5 MG: 20 INJECTION, SOLUTION INTRAMUSCULAR; INTRAVENOUS at 12:27

## 2022-03-28 NOTE — PROGRESS NOTES
[facility] [address]  Progress Note Gely Castillo 1960, 57 y o  male MRN: [id number]  Unit/Bed#: 406-01 Encounter: [id number]  Primary Care Provider: Grace Ruiz DO   Date and time admitted to hospital: 3/27/2022  2:51 PM    * Partial small bowel obstruction (HCC)  Assessment & Plan  Partial small-bowel obstruction by CT, with obstruction series ordered and pending  Patient denies any prior abdominal surgeries hydrated pre or intra-abdominal infections  Has no history of IBD  Does report a 3 day history of nausea and vomiting which may be related to SBO, but states his wife is also having similar symptoms currently  · Continue NG tube  · Remains NPO  · Maintain on gentle IV fluids  · Antiemetics as needed  · Surgery consulted and following  ROSANA (acute kidney injury) ([facility] [address] )  Assessment & Plan  Likely prerenal, resolved with IV fluids  Arb continue to monitor renal function  Primary hypertension  Assessment & Plan  BP upon admission is 166/98 - currently stable in the 140's  Currently hold ARB  Continue to monitor blood pressure, with IV hydralazine or as needed  Mixed hyperlipidemia  Assessment & Plan  Continue high potency statin, Zetia  Hypothyroidism  Assessment & Plan  Continue thyroid replacement therapy  TSH normal       VTE Pharmacologic Prophylaxis: VTE Score: 3 Moderate Risk (Score 3-4) - Pharmacological DVT Prophylaxis Ordered: heparin  Patient Centered Rounds: I performed bedside rounds with nursing staff today  Discussions with Specialists or Other Care Team Provider:  General surgery    Education and Discussions with Family / Patient: Updated  (wife) via phone  Time Spent for Care: 30 minutes  More than 50% of total time spent on counseling and coordination of care as described above      Current Length of Stay: 1 day(s)  Current Patient Status: Inpatient   Certification Statement: The patient will continue to require additional inpatient hospital stay due to Continue treatment of partial small-bowel obstruction  Discharge Plan: Anticipate discharge in 48-72 hrs to home  Code Status: Level 1 - Full Code    Subjective:   Patient seen and examined - reports overall improvement in symptoms  No overnight events reported  Remains afebrile  Objective:     Vitals:   Temp (24hrs), Av 7 °F (37 1 °C), Min:98 2 °F (36 8 °C), Max:99 5 °F (37 5 °C)    Temp:  [98 2 °F (36 8 °C)-99 5 °F (37 5 °C)] 98 6 °F (37 °C)  HR:  [79-94] 94  Resp:  [14-20] 18  BP: (125-166)/() 147/92  SpO2:  [93 %-97 %] 95 %  Body mass index is 32 27 kg/m²  Input and Output Summary (last 24 hours): Intake/Output Summary (Last 24 hours) at 3/28/2022 1411  Last data filed at 3/28/2022 1101  Gross per 24 hour   Intake 3385 ml   Output 1215 ml   Net 2170 ml       Physical Exam:   Physical Exam  Vitals reviewed  Constitutional:       General: He is not in acute distress  Appearance: He is well-developed  He is obese  He is not ill-appearing or toxic-appearing  Cardiovascular:      Rate and Rhythm: Normal rate and regular rhythm  Heart sounds: No murmur heard  Pulmonary:      Effort: Pulmonary effort is normal  No respiratory distress  Breath sounds: Normal breath sounds  No wheezing or rhonchi  Abdominal:      Palpations: Abdomen is soft  Tenderness: There is no abdominal tenderness  There is no guarding or rebound  Comments: Decreased bowel sounds  Musculoskeletal:         General: No swelling or tenderness  Cervical back: Neck supple  Skin:     General: Skin is warm and dry  Neurological:      General: No focal deficit present  Mental Status: He is alert and oriented to person, place, and time     Psychiatric:         Mood and Affect: Mood normal          Behavior: Behavior normal        Additional Data:     Labs:  Results from last 7 days   Lab Units 22  0437   WBC Thousand/uL 8 42 HEMOGLOBIN g/dL 16 8   HEMATOCRIT % 50 4*   PLATELETS Thousands/uL 186   NEUTROS PCT % 65   LYMPHS PCT % 18   MONOS PCT % 15*   EOS PCT % 2     Results from last 7 days   Lab Units 03/28/22  0437   SODIUM mmol/L 139   POTASSIUM mmol/L 3 9   CHLORIDE mmol/L 104   CO2 mmol/L 25   BUN mg/dL 14   CREATININE mg/dL 1 13   ANION GAP mmol/L 10   CALCIUM mg/dL 8 2*   ALBUMIN g/dL 3 0*   TOTAL BILIRUBIN mg/dL 0 72   ALK PHOS U/L 56   ALT U/L 23   AST U/L 17   GLUCOSE RANDOM mg/dL 97                 Results from last 7 days   Lab Units 03/27/22  1748 03/27/22  1526   LACTIC ACID mmol/L 1 0 2 8*       Lines/Drains:  Invasive Devices  Report    Peripheral Intravenous Line            Peripheral IV 03/27/22 Left Antecubital <1 day          Drain            NG/OG/Enteral Tube Left nare -- days              Imaging: Reviewed radiology reports from this admission including: abdominal/pelvic CT    Recent Cultures (last 7 days):         Last 24 Hours Medication List:   Current Facility-Administered Medications   Medication Dose Route Frequency Provider Last Rate    atorvastatin  20 mg Oral Daily Estefania Hicks PA-C      ezetimibe  10 mg Oral Daily Estefania Hicks PA-C      heparin (porcine)  5,000 Units Subcutaneous Q8H [address] Davi Guardado PA-C      hydrALAZINE  5 mg Intravenous Q6H [address] Davi Guardado PA-C      HYDROmorphone  0 5 mg Intravenous Q4H PRN Estefania Hicks PA-C      levothyroxine  100 mcg Oral Early Morning Estefania Hicks PA-C      multi-electrolyte  100 mL/hr Intravenous Continuous Estefania Hicks PA-C 100 mL/hr (03/28/22 0841)    ondansetron  4 mg Intravenous Q6H PRN Estefania Hicks PA-C      pantoprazole  40 mg Intravenous Q24H [address] Estefania Hicks PA-C          Today, Patient Was Seen By: Con Turner MD    **Please Note: This note may have been constructed using a voice recognition system  **

## 2022-03-28 NOTE — ASSESSMENT & PLAN NOTE
Partial small-bowel obstruction by CT, with obstruction series ordered and pending  Patient denies any prior abdominal surgeries hydrated pre or intra-abdominal infections  Has no history of IBD  Does report a 3 day history of nausea and vomiting which may be related to SBO, but states his wife is also having similar symptoms currently  · Continue NG tube  · Remains NPO  · Maintain on gentle IV fluids  · Antiemetics as needed  · Surgery consulted and following

## 2022-03-28 NOTE — CONSULTS
Consultation - General Surgery   Geni Castillo 57 y o  male MRN: [id number]  Unit/Bed#: 406-01 Encounter: [id number]    Assessment/Plan     Assessment:    Partial small-bowel obstruction  Patient has never had any abdominal surgery, no prior intra-abdominal infections or inflammatory bowel disease  Three day history of abdominal pain with nausea vomiting and diarrhea started on Friday  This is his 1st episode of similar symptoms  CT scan of the abdomen pelvis done in the ED yesterday showed multiple dilated loops of small bowel involving the jejunum and slight transition point to normal appearing ileum  Also 1 1 left lower pole kidney stone noted and diverticulosis  Initial lactic acidosis present on admission of 2 8 now resolved with IV fluids  WBC on admission 10 14, repeated today 8 42  Hemoglobin 16 8  Low urine output past 24 hours, suspect bladder outlet obstruction  Patient with history of prostate enlargement, not currently on any current BPH meds  Essential hypertension  GERD    Plan:  Seen and examined at bedside  Discussed via telephone with Dr Natalia Mcmahan  Nothing by mouth except p o  meds  NG tube to intermittent low wall suction  I&Os  Serial abdominal exam  Analgesics/antiemetics as ordered p r n  Conservative non operative management at this time, no indication for surgical intervention  Maintain IV fluids for hydration, the patient already was given 3 L since admission  Will obtain obstruction series this morning  CBC, CMP in a m  Urinary retention protocol  Bladder scan at this time  Medical management per the attending hospitalist    History of Present Illness     HPI:  Andrew Sylvester is a 57 y o  male who presents with a three day history of right upper quadrant abdominal pain which began Friday night  Accompanied with nausea and stabbing pain in the upper right and left quadrants  Prior history of similar symptoms  No history of ulcer disease or NSAID use  He denies any belching or eructation  Patient had several episodes of watery diarrhea started on Saturday and up until he was seen in the emergency room here yesterday afternoon  NG tube was inserted in the ED  CT scan of the abdomen and pelvis showed multiple loops of mild to moderate dilated small bowel involving the jejunum and proximal ileum, concern for partial small-bowel obstruction  Patient has no prior history of similar episodes  He has never had any abdominal surgery  Patient currently is nothing by mouth  He has some nausea present, abdominal pain is improved with current medication  He said that he has voided very little urine since admitted  Apparently this morning the PCA emptied his bedside urinal, he reported about 200 mL but this is the only time he passed urine since admission  Inpatient consult to Acute Care Surgery  Consult performed by: Maria Elena Alanis PA-C  Consult ordered by: Rosi Gonzalez PA-C        Review of Systems   Constitutional: Positive for appetite change  Negative for activity change, chills, diaphoresis, fatigue, fever and unexpected weight change  HENT: Negative  Eyes: Negative  Respiratory: Positive for chest tightness  Negative for cough, shortness of breath, wheezing and stridor  Cardiovascular: Negative for chest pain, palpitations and leg swelling  Gastrointestinal: Positive for abdominal distention, abdominal pain, diarrhea, nausea and vomiting  Negative for blood in stool, constipation and rectal pain  Endocrine: Negative  Genitourinary: Positive for decreased urine volume and difficulty urinating  Musculoskeletal: Negative  Skin: Negative  Allergic/Immunologic: Negative  Neurological: Negative  Hematological: Negative  Psychiatric/Behavioral: Negative          Historical Information   Past Medical History:   Diagnosis Date    Disease of thyroid gland     GERD (gastroesophageal reflux disease)     Hyperlipidemia     Hypertension      Past Surgical History:   Procedure Laterality Date    CARPAL TUNNEL RELEASE      TONSILLECTOMY       Social History   Social History     Substance and Sexual Activity   Alcohol Use Yes    Comment: socially     Social History     Substance and Sexual Activity   Drug Use No    Comment: Denied use     E-Cigarette/Vaping    E-Cigarette Use Never User      E-Cigarette/Vaping Substances    Nicotine No     THC No     CBD No     Flavoring No     Other No     Unknown No      Social History     Tobacco Use   Smoking Status Never Smoker   Smokeless Tobacco Never Used   Tobacco Comment    per [employer]-Former Smoker     Family History: Family history of heart disease in his father  Does not maintain contact with his mother  He has 2 children, living in good health      Meds/Allergies   current meds:   Current Facility-Administered Medications   Medication Dose Route Frequency    atorvastatin (LIPITOR) tablet 20 mg  20 mg Oral Daily    ezetimibe (ZETIA) tablet 10 mg  10 mg Oral Daily    heparin (porcine) subcutaneous injection 5,000 Units  5,000 Units Subcutaneous Q8H [address]    hydrALAZINE (APRESOLINE) injection 5 mg  5 mg Intravenous Q6H [address]    HYDROmorphone (DILAUDID) injection 0 5 mg  0 5 mg Intravenous Q4H PRN    levothyroxine tablet 100 mcg  100 mcg Oral Early Morning    multi-electrolyte (PLASMALYTE-A/ISOLYTE-S PH 7 4) IV solution  100 mL/hr Intravenous Continuous    ondansetron (ZOFRAN) injection 4 mg  4 mg Intravenous Q6H PRN    pantoprazole (PROTONIX) injection 40 mg  40 mg Intravenous Q24H [address]     No Known Allergies    Objective   First Vitals:   Blood Pressure: 125/78 (03/27/22 1505)  Pulse: 90 (03/27/22 1505)  Temperature: 98 2 °F (36 8 °C) (03/27/22 1505)  Temp Source: Tympanic (03/27/22 1505)  Respirations: 14 (03/27/22 1505)  Height: 5' 10" (177 8 cm) (03/27/22 2051)  Weight - Scale: 102 kg (224 lb 6 9 oz) (03/27/22 1505)  SpO2: 96 % (03/27/22 1505)    Current Vitals:   Blood Pressure: 147/92 (03/28/22 0708)  Pulse: 94 (03/28/22 0708)  Temperature: 98 6 °F (37 °C) (03/28/22 0708)  Temp Source: Oral (03/28/22 0708)  Respirations: 18 (03/28/22 0708)  Height: 5' 10" (177 8 cm) (03/27/22 2051)  Weight - Scale: 102 kg (224 lb 13 9 oz) (03/28/22 0600)  SpO2: 95 % (03/28/22 0708)    I/O       03/26 0701 03/27 0700 03/27 0701  03/28 0700 03/28 0701 03/29 0700    P  O    0    I V  (mL/kg)   1385 (13 6)    IV Piggyback  2000     Total Intake(mL/kg)  2000 (19 6) 1385 (13 6)    Urine (mL/kg/hr)  250 240 (0 6)    Emesis/NG output  275 450    Total Output  525 690    Net  +1475 +695                   Invasive Devices  Report    Peripheral Intravenous Line            Peripheral IV 03/27/22 Left Antecubital <1 day          Drain            NG/OG/Enteral Tube Left nare -- days                Physical Exam  Constitutional:       General: He is not in acute distress  Appearance: He is ill-appearing  He is not toxic-appearing or diaphoretic  HENT:      Head: Normocephalic and atraumatic  Nose: Nose normal       Mouth/Throat:      Mouth: Mucous membranes are moist       Pharynx: Oropharynx is clear  Eyes:      Conjunctiva/sclera: Conjunctivae normal       Pupils: Pupils are equal, round, and reactive to light  Cardiovascular:      Rate and Rhythm: Normal rate and regular rhythm  Heart sounds: No murmur heard  No gallop  Pulmonary:      Effort: Pulmonary effort is normal  No respiratory distress  Breath sounds: No stridor  No wheezing, rhonchi or rales  Chest:      Chest wall: No tenderness  Abdominal:      General: There is distension  Palpations: Abdomen is soft  There is no mass  Tenderness: There is abdominal tenderness  There is no guarding or rebound  Hernia: No hernia is present  Comments: Abdomen round  Bowel sounds are faint and rare, distant intensity  Abdomen is tympanic to percussion in the left upper and right upper quadrants    Negative Olsen sign  Liver edge is not palpable  No fluid wave is elicited  No abdominal hernias  No scars noted  No peritoneal signs  Is soft to palpation tenderness in the epigastric and umbilical area  Genitourinary:     Penis: Normal     Musculoskeletal:         General: No swelling, tenderness, deformity or signs of injury  Normal range of motion  Cervical back: Normal range of motion and neck supple  Skin:     General: Skin is warm  Capillary Refill: Capillary refill takes less than 2 seconds  Coloration: Skin is not jaundiced or pale  Findings: No bruising, erythema, lesion or rash  Comments: Skin throughout appears well tanned  Neurological:      General: No focal deficit present  Mental Status: He is alert and oriented to person, place, and time  Mental status is at baseline  Cranial Nerves: No cranial nerve deficit  Sensory: No sensory deficit  Motor: No weakness  Coordination: Coordination normal    Psychiatric:         Mood and Affect: Mood normal          Behavior: Behavior normal          Thought Content: Thought content normal          Judgment: Judgment normal         Lab Results:   I have personally reviewed pertinent lab results    , CBC:   Lab Results   Component Value Date    WBC 8 42 03/28/2022    HGB 16 8 03/28/2022    HCT 50 4 (H) 03/28/2022    MCV 92 03/28/2022     03/28/2022    MCH 30 5 03/28/2022    MCHC 33 3 03/28/2022    RDW 13 4 03/28/2022    MPV 10 8 03/28/2022    NRBC 0 03/28/2022   , CMP:   Lab Results   Component Value Date    SODIUM 139 03/28/2022    K 3 9 03/28/2022     03/28/2022    CO2 25 03/28/2022    BUN 14 03/28/2022    CREATININE 1 13 03/28/2022    CALCIUM 8 2 (L) 03/28/2022    AST 17 03/28/2022    ALT 23 03/28/2022    ALKPHOS 56 03/28/2022    EGFR 69 03/28/2022   , Coagulation: No results found for: PT, INR, APTT, Urinalysis: No results found for: COLORU, CLARITYU, SPECGRAV, PHUR, LEUKOCYTESUR, NITRITE, Kaycee Metcalf, BILIRUBINUR, BLOODU, Lipase:   Lab Results   Component Value Date    LIPASE 95 03/27/2022     Lactic acid 2 Hours  Order: [id number] - Reflex for Order [id number]   Status: Final result     Visible to patient: Yes (seen)     Next appt: None     0 Result Notes    Component Ref Range & Units 3/27/22 1748 3/27/22 1526 5/14/21 2300   LACTIC ACID 0 5 - 2 0 mmol/L 1 0  2 8 High Panic   1 5             Imaging: I have personally reviewed pertinent films in PACS     CT ABDOMEN AND PELVIS WITH IV CONTRAST     INDICATION:   RLQ abdominal pain (Age >= 14y)  RLQ abd pain r/o appy  bilateral lower quadrant pain since friday, rebound tenderness in RLQ, vomitting, nausea  no surgical hx per pt     COMPARISON:  None      TECHNIQUE:  CT examination of the abdomen and pelvis was performed  Axial, sagittal, and coronal 2D reformatted images were created from the source data and submitted for interpretation      Radiation dose length product (DLP) for this visit:  663 mGy-cm   This examination, like all CT scans performed in the [facility], was performed utilizing techniques to minimize radiation dose exposure, including the use of iterative   reconstruction and automated exposure control      IV Contrast:  100 mL of iohexol (OMNIPAQUE)  Enteric Contrast:  Enteric contrast was not administered      FINDINGS:     ABDOMEN     LOWER CHEST:  No clinically significant abnormality identified in the visualized lower chest      LIVER/BILIARY TREE:  tt     GALLBLADDER:  No calcified gallstones  No pericholecystic inflammatory change      SPLEEN:  Unremarkable      PANCREAS:  Unremarkable      ADRENAL GLANDS:  Unremarkable      KIDNEYS/URETERS:  One or more simple renal cyst(s) is noted  One or more sharply circumscribed subcentimeter renal hypodensities are present, too small to accurately characterize, and statistically most likely benign findings   According to recent   literature (Radiology 2019) no further workup of these findings is recommended  Nonobstructing right lower pole collecting system calculus measures up to 11 mm in greatest dimension  No solid renal mass identified      STOMACH AND BOWEL:  Multiple loops of mild-moderate small bowel distention involving jejunum and proximal ileum are identified concerning for partial small bowel obstruction with slight transition point to normal caliber ileum (series 2 image 46)   possibly due to underlying adhesion  No discrete mass lesion identified  The colon is decompressed  Colonic diverticulosis without evidence for diverticulitis is identified      APPENDIX:  No findings to suggest appendicitis      ABDOMINOPELVIC CAVITY:  No ascites  No pneumoperitoneum  No lymphadenopathy      VESSELS:  Unremarkable for patient's age      PELVIS     REPRODUCTIVE ORGANS:  Unremarkable for patient's age      URINARY BLADDER:  Unremarkable      ABDOMINAL WALL/INGUINAL REGIONS:  There is a small fat-containing umbilical hernia      OSSEOUS STRUCTURES:  No acute fracture or destructive osseous lesion  Spinal degenerative changes are noted      IMPRESSION:        1  Multiple loops of mild-moderate small bowel distention involving the jejunum and proximal ileum concerning for partial small bowel obstruction as described above with slight transition point in the left mid abdomen  2   Diverticulosis without diverticulitis  3   Nonobstructing 11 mm right lower pole renal calculus  4   Additional findings as noted  EKG, Pathology, and Other Studies: I have personally reviewed pertinent films in PACS    Counseling / Coordination of Care  Total floor / unit time spent today 45 minutes  Greater than 50% of total time was spent with the patient and / or family counseling and / or coordination of care    A description of the counseling / coordination of care:  Review of diagnostic imaging laboratory studies, personal examination patient, discussion with Dr Blessing Madden Simi Melara all conducted in the general surgical evaluation of the patient at this time  Leroy Mo PA-C

## 2022-03-28 NOTE — ASSESSMENT & PLAN NOTE
BP upon admission is 166/98 - currently stable in the 140's  Currently hold ARB  Continue to monitor blood pressure, with IV hydralazine or as needed

## 2022-03-28 NOTE — PLAN OF CARE
Problem: INFECTION - ADULT  Goal: Absence or prevention of progression during hospitalization  Description: INTERVENTIONS:  - Assess and monitor for signs and symptoms of infection  - Monitor lab/diagnostic results  - Monitor all insertion sites, i e  indwelling lines, tubes, and drains  - [place] appropriate cooling/warming therapies per order  - Administer medications as ordered  - Instruct and encourage patient and family to use good hand hygiene technique  - Identify and instruct in appropriate isolation precautions for identified infection/condition  Outcome: Progressing     Problem: SAFETY ADULT  Goal: Patient will remain free of falls  Description: INTERVENTIONS:  - Educate patient/family on patient safety including physical limitations  - Instruct patient to call for assistance with activity   - Consult OT/PT to assist with strengthening/mobility   - Keep Call bell within reach  - Keep bed low and locked with side rails adjusted as appropriate  - Keep care items and personal belongings within reach  - Initiate and maintain comfort rounds  - Make Fall Risk Sign visible to staff  - Offer Toileting every two Hours, in advance of need  - Obtain necessary fall risk management equipment: non slip socks   - Apply yellow socks and bracelet for high fall risk patients  - Consider moving patient to room near nurses station  Outcome: Progressing     Problem: GASTROINTESTINAL - ADULT  Goal: Minimal or absence of nausea and/or vomiting  Description: INTERVENTIONS:  - Administer IV fluids if ordered to ensure adequate hydration  - Maintain NPO status until nausea and vomiting are resolved  - Nasogastric tube if ordered  - Administer ordered antiemetic medications as needed  - Provide nonpharmacologic comfort measures as appropriate  - Advance diet as tolerated, if ordered  - Consider nutrition services referral to assist patient with adequate nutrition and appropriate food choices  Outcome: Progressing

## 2022-03-28 NOTE — UTILIZATION REVIEW
Initial Clinical Review    Admission: Date/Time/Statement:   Admission Orders (From admission, onward)     Ordered        03/27/22 1903  Inpatient Admission  Once                       Inpatient Admission     Standing Status:   Standing     Number of Occurrences:   1     Order Specific Question:   Level of Care     Answer:   Med Surg [16]     Order Specific Question:   Estimated length of stay     Answer:   More than 2 Midnights     Order Specific Question:   Certification     Answer:   I certify that inpatient services are medically necessary for this patient for a duration of greater than two midnights  See H&P and MD Progress Notes for additional information about the patient's course of treatment  ED Arrival Information     Expected Arrival Acuity    3/27/2022  3/27/2022 14:40 Urgent         Means of arrival Escorted by Service Admission type    SERGE SMALL  [facility] Hospitalist Urgent         Arrival complaint    Right lower quadrant abdominal pain        Chief Complaint   Patient presents with    Abdominal Pain     RUQ and LUQ pain since friday, associated n/v/d  Denies fever  Initial Presentation: 57 y o  male presents to ed from home for evaluation and treatment of  RUQ & LUQ pain associated with nausea, vomiting and diarrhea  Clinical assessment significant for lactic acid 2 8, creatinine 1 54     Imaging shows multiple loops of small bowel distension involving the jejunum, and proximal ileum concerning for partial small bowel obstruction  Initially treated with iv lactated ringers bolus, iv toradol, iv lactated ringers bolus, iv multi-electrolyte 100/hr  NGT placed  Admit to inpatient for partial small bowel obstruction  Date: 3-28-22   Day 2:  Inpatient med surg  Continue treatment of partial small bowel obstruction with plan for NPO, iv fluids keep NGT, consult surgery  Acute kidney injury likely pre renal with plan includes iv fluids 100/hr, trend bmp, avoid nephrotoxins        General surgery consult  3-28-22   Partial small-bowel obstruction  Patient has never had any abdominal surgery, no prior intra-abdominal infections or inflammatory bowel disease  Three day history of abdominal pain with nausea vomiting and diarrhea started on Friday  This is his 1st episode of similar symptoms    Nothing by mouth except p o  meds  NG tube to intermittent low wall suction  I&Os  Serial abdominal exam  Analgesics/antiemetics as ordered p r n  Conservative non operative management at this time, no indication for surgical intervention  Maintain IV fluids for hydration, the patient already was given 3 L since admission  Will obtain obstruction series this morning  CBC, CMP in a m  Urinary retention protocol  Bladder scan at this time    Starting to pass flatus  Await read of xrays  Continue NGT for now      ED Triage Vitals [03/27/22 1505]   98 2 °F (36 8 °C) 90 14 125/78 96 %      Tympanic Monitor         Pain Score       1          03/28/22 102 kg (224 lb 13 9 oz)     Additional Vital Signs:     Date/Time Temp Pulse Resp BP MAP (mmHg) SpO2 O2 Device   03/28/22 07:08:58 98 6 °F (37 °C) 94 18 147/92 110 95 % None (Room air)   03/28/22 00:15:05 98 7 °F (37 1°C) 89 18 148/90 109 93 % None (Room air)   03/27/22 2158 -- -- -- -- -- -- None (Room air)   03/27/22 2141 -- -- -- 150/100 -- -- --   03/27/22 20:51:22 99 5 °F (37 5°C) 89 18 157/103   Abnormal  121 93 % None (Room air)   03/27/22 1956 -- 79 16 160/89 116 94 % None (Room air)   03/27/22 1730 -- 79 16 166/98 125 96 % None (Room air)   03/27/22 1505 98 2 °F (36 8°C) 90 14 125/78 -- 96 % None (Room air)                 Pertinent Labs/Diagnostic Test Results:     Collection Time Result Time Vent R Atrial R NV Int  QRSD Int  QT Int  QTC Int   P Axis QRS Axis T Wave Ax    03/27/22 19:58:02 03/28/22 08:11:48 82 82 206 62 334 390 34 267 36                       Normal sinus rhythm   Right superior axis deviation   Low voltage QRS   Inferior infarct (cited on or before 27-MAR-2022)   Cannot rule out Anterior infarct (cited on or before 27-MAR-2022)   Abnormal ECG                 Collection Time Result Time Vent R Atrial R SC Int  QRSD Int  QT Int  QTC Int  P Axis QRS Axis T Wave Ax    03/27/22 19:55:28 03/28/22 08:12:16 83 83 200 96 350 411 -1 -71 25                       Sinus rhythm   Left axis deviation   Inferior infarct (cited on or before 27-MAR-2022)   Anterior infarct (cited on or before 27-MAR-2022)                   CT abdomen pelvis with contrast   Final (03/27 1729)         1  Multiple loops of mild-moderate small bowel distention involving the jejunum and proximal ileum concerning for partial small bowel obstruction as described above with slight transition point in the left mid abdomen  2   Diverticulosis without diverticulitis  3   Nonobstructing 11 mm right lower pole renal calculus  4   Additional findings as noted          Results from last 7 days   Lab Units 03/27/22  1526   SARS-COV-2  Negative     Results from last 7 days   Lab Units 03/28/22 0437 03/27/22 2011 03/27/22  1526   WBC Thousand/uL 8 42  --  10 14   HEMOGLOBIN g/dL 16 8  --  19 0*   HEMATOCRIT % 50 4*  --  55 8*   PLATELETS Thousands/uL 186 167 167   NEUTROS ABS Thousands/µL 5 42  --  6 94         Results from last 7 days   Lab Units 03/28/22  0437 03/27/22  1526   SODIUM mmol/L 139 141   POTASSIUM mmol/L 3 9 4 4   CHLORIDE mmol/L 104 103   CO2 mmol/L 25 28   ANION GAP mmol/L 10 10   BUN mg/dL 14 16   CREATININE mg/dL 1 13 1 54*   EGFR ml/min/1 73sq m 69 47   CALCIUM mg/dL 8 2* 9 0     Results from last 7 days   Lab Units 03/28/22  0437 03/27/22  1526   AST U/L 17 20   ALT U/L 23 32   ALK PHOS U/L 56 70   TOTAL PROTEIN g/dL 6 6 7 9   ALBUMIN g/dL 3 0* 3 7   TOTAL BILIRUBIN mg/dL 0 72 0 65   BILIRUBIN DIRECT mg/dL  --  0 10         Results from last 7 days   Lab Units 03/28/22 0437 03/27/22  1526   GLUCOSE RANDOM mg/dL 97 114       Results from last 7 days   Lab Units 03/27/22 2011 03/27/22  1748 03/27/22  1526   HS TNI 0HR ng/L  --   --  21   HS TNI 2HR ng/L  --  36  --    HSTNI D2 ng/L  --  15  --    HS TNI 4HR ng/L 28  --   --    HSTNI D4 ng/L 7  --   --          Results from last 7 days   Lab Units 03/28/22  0437   PTT seconds 32     Results from last 7 days   Lab Units 03/28/22  0437   TSH 3RD GENERATON uIU/mL 3 370         Results from last 7 days   Lab Units 03/27/22  1748 03/27/22  1526   LACTIC ACID mmol/L 1 0 2 8*             Results from last 7 days   Lab Units 03/27/22  1526   NT-PRO BNP pg/mL 68             Results from last 7 days   Lab Units 03/27/22  1526   LIPASE u/L 95       Results from last 7 days   Lab Units 03/28/22  1101   CLARITY UA  Clear   COLOR UA  Yellow   SPEC GRAV UA  1 025   PH UA  6 5   GLUCOSE UA mg/dl Negative   KETONES UA mg/dl >=80 (3+)*   BLOOD UA  Trace-Intact*   PROTEIN UA mg/dl Trace*   NITRITE UA  Negative   BILIRUBIN UA  Negative   UROBILINOGEN UA E U /dl 0 2   LEUKOCYTES UA  Negative   WBC UA /hpf 0-1   RBC UA /hpf 1-2   BACTERIA UA /hpf Occasional   EPITHELIAL CELLS WET PREP /hpf Occasional     Results from last 7 days   Lab Units 03/27/22  1526   INFLUENZA A PCR  Negative   INFLUENZA B PCR  Negative   RSV PCR  Negative         ED Treatment:   Medication Administration from 03/27/2022 1435 to 03/27/2022 2025       Date/Time Order Dose Route Action     03/27/2022 1525 lactated ringers bolus 1,000 mL 1,000 mL Intravenous New Bag     03/27/2022 1525 ketorolac (TORADOL) injection 15 mg 15 mg Intravenous Given     03/27/2022 1754 lactated ringers bolus 1,000 mL 1,000 mL Intravenous New Bag     03/27/2022 2010 multi-electrolyte (PLASMALYTE-A/ISOLYTE-S PH 7 4) IV solution 100 mL/hr Intravenous New Bag        Past Medical History:   Diagnosis    Disease of thyroid gland    GERD (gastroesophageal reflux disease)    Hyperlipidemia    Hypertension     Present on Admission:  **None**      Admitting Diagnosis:     Abdominal pain [R10 9]  Partial small bowel obstruction ([facility] ) [K56 600]  ROSANA (acute kidney injury) ([facility] ) [N17 9]    Age/Sex: 57 y o  male    Scheduled Medications:    atorvastatin, 20 mg, Oral, Daily  ezetimibe, 10 mg, Oral, Daily  heparin (porcine), 5,000 Units, Subcutaneous, Q8H KYLE  hydrALAZINE, 5 mg, Intravenous, Q6H KYLE  levothyroxine, 100 mcg, Oral, Early Morning  pantoprazole, 40 mg, Intravenous, Q24H KYLE      Continuous IV Infusions:  multi-electrolyte, 100 mL/hr, Intravenous, Continuous      PRN Meds:  HYDROmorphone, 0 5 mg, Intravenous, Q4H PRN  ondansetron, 4 mg, Intravenous, Q6H PRN        IP CONSULT TO ACUTE CARE SURGERY    Network Utilization Review Department  ATTENTION: Please call with any questions or concerns to [phone] and carefully listen to the prompts so that you are directed to the right person  All voicemails are confidential   Karmen August all requests for admission clinical reviews, approved or denied determinations and any other requests to dedicated fax number below belonging to the campus where the patient is receiving treatment   List of dedicated fax numbers for the Facilities:  [address] [place] [address] DENIALS (Administrative/Medical Necessity) [phone]   [address] (Maternity/NICU/Pediatrics) [phone]   [address]  [address] [facility] [place] [address] [address] [place] [address] Leah Veronica Amor [address] [facility] [phone]

## 2022-03-28 NOTE — CASE MANAGEMENT
Case Management Assessment & Discharge Planning Note    Patient name Liberty Barajas  Location [facility] 507/[id number] MRN [id number]  : 1960 Date 3/28/2022       Current Admission Date: 3/27/2022  Current Admission Diagnosis:Partial small bowel obstruction [facility])   Patient Active Problem List    Diagnosis Date Noted    ROSANA (acute kidney injury) ([facility] ) 2022    Mixed hyperlipidemia 2022    Partial small bowel obstruction ([facility] ) 2022    Primary hypertension 2022    Hypothyroidism 2022    Encounter for [employer] ([address]) examination 2019      LOS (days): 1  Geometric Mean LOS (GMLOS) (days):   Days to GMLOS:     OBJECTIVE:    Risk of Unplanned Readmission Score: 8         Current admission status: Inpatient       [facility]acy:   [facility] [address] [place] [address] Miguelangel Davidms, [address] [place] [id number]  Phone: [phone] Fax: [phone]    Primary Care Provider: Chanelle Zimmerman DO    Primary Insurance: BLUE CROSS  Secondary Insurance:     ASSESSMENT:  [facility], [address] [facility] Representative - Spouse   Primary Phone: [phone] (Mobile)  Home Phone: [phone]               Advance Directives  Does patient have a [address]?: No  Was patient offered paperwork?: Yes (declines)  Does patient currently have a Health Care decision maker?: Yes, please see Health Care Proxy section  Does patient have Advance Directives?: No  Was patient offered paperwork?: Yes (declines)  Primary Contact:  Becka 141 (Spouse) [phone] (Z) [phone] (M)         Readmission Root Cause  30 Day Readmission: No    Patient Information  Admitted from[de-identified] Home  Mental Status: Alert  During Assessment patient was accompanied by: Not accompanied during assessment  Assessment information provided by[de-identified] Patient  Primary Caregiver: Self  Support Systems: Spouse/significant other  [place] of Residence: [address] do you live in?: [place]  Home entry access options   Select all that apply : No steps to enter home  Type of Current Residence: Other (Comment) (split level home)  In the last 12 months, was there a time when you were not able to pay the mortgage or rent on time?: No  In the last 12 months, was there a time when you did not have a steady place to sleep or slept in a shelter (including now)?: No  Homeless/housing insecurity resource given?: N/A  Living Arrangements: Lives w/ Spouse/significant other    Activities of Daily Living Prior to Admission  Functional Status: Independent  Completes ADLs independently?: Yes  Ambulates independently?: Yes  Does patient use assisted devices?: No  Does patient currently own DME?: No  Does patient have a history of Outpatient Therapy (PT/OT)?: Yes (hx OP PT [place])  Does the patient have a history of Short-Term Rehab?: No  Does patient have a history of HHC?: No  Does patient currently have [address]?: No         Patient Information Continued  Does patient have prescription coverage?: Yes  Within the past 12 months, you worried that your food would run out before you got the money to buy more : Never true  Within the past 12 months, the food you bought just didnt last and you didnt have money to get more : Never true  Food insecurity resource given?: N/A  Does patient receive dialysis treatments?: No  Does patient have a history of substance abuse?: No  Does patient have a history of Mental Health Diagnosis?: No    PHQ 2/9 Screening   Reviewed PHQ 2/9 Depression Screening Score?: No    Means of Transportation  Means of Transport to Appts[de-identified] Drives Self  In the past 12 months, has lack of transportation kept you from medical appointments or from getting medications?: No  In the past 12 months, has lack of transportation kept you from meetings, work, or from getting things needed for daily living?: No  Was application for public transport provided?: N/A        DISCHARGE DETAILS:    Discharge planning discussed with[de-identified] patient        CM contacted family/caregiver?: No- see comments (declines)  Were Treatment Team discharge recommendations reviewed with patient/caregiver?: Yes  Did patient/caregiver verbalize understanding of patient care needs?: Yes  Were patient/caregiver advised of the risks associated with not following Treatment Team discharge recommendations?: Yes         [address]         Is the patient interested in [facility] at discharge?: No    DME Referral Provided  Referral made for DME?: No    Discharge Destination Plan[de-identified] Home  Transport at Discharge : Family (spouse)   Plans at this time are home on dc with Op follow up  Cm will follow and assist in dc planning

## 2022-03-28 NOTE — ASSESSMENT & PLAN NOTE
· Patient presented with acute abdominal pain with nausea, vomiting  · CT scan showed partial small bowel obstruction  · ED provider discussed case with general surgery would like the patient admitted here for bowel rest, surgery consult  · NPO  · IV fluids  Isolyte 100 mL/hr  · NG-tube placed by ED  · Surgery consult  · Pain control with IV Dilaudid 0 5 mg Q 4 p r n   Pain

## 2022-03-28 NOTE — PLAN OF CARE
Problem: PAIN - ADULT  Goal: Verbalizes/displays adequate comfort level or baseline comfort level  Description: Interventions:  - Encourage patient to monitor pain and request assistance  - Assess pain using appropriate pain scale  - Administer analgesics based on type and severity of pain and evaluate response  - Implement non-pharmacological measures as appropriate and evaluate response  - Consider cultural and social influences on pain and pain management  - Notify physician/advanced practitioner if interventions unsuccessful or patient reports new pain  Outcome: Progressing     Problem: INFECTION - ADULT  Goal: Absence or prevention of progression during hospitalization  Description: INTERVENTIONS:  - Assess and monitor for signs and symptoms of infection  - Monitor lab/diagnostic results  - Monitor all insertion sites, i e  indwelling lines, tubes, and drains  - [place] appropriate cooling/warming therapies per order  - Administer medications as ordered  - Instruct and encourage patient and family to use good hand hygiene technique  - Identify and instruct in appropriate isolation precautions for identified infection/condition  Outcome: Progressing     Problem: SAFETY ADULT  Goal: Patient will remain free of falls  Description: INTERVENTIONS:  - Educate patient/family on patient safety including physical limitations  - Instruct patient to call for assistance with activity   - Consult OT/PT to assist with strengthening/mobility   - Keep Call bell within reach  - Keep bed low and locked with side rails adjusted as appropriate  - Keep care items and personal belongings within reach  - Initiate and maintain comfort rounds  - Make Fall Risk Sign visible to staff  - Offer Toileting every two Hours, in advance of need  - Obtain necessary fall risk management equipment: non slip socks   - Apply yellow socks and bracelet for high fall risk patients  - Consider moving patient to room near nurses station  Outcome: Progressing  Goal: Maintain or return to baseline ADL function  Description: INTERVENTIONS:  -  Assess patient's ability to carry out ADLs; assess patient's baseline for ADL function and identify physical deficits which impact ability to perform ADLs (bathing, care of mouth/teeth, toileting, grooming, dressing, etc )  - Assess/evaluate cause of self-care deficits   - Assess range of motion  - Assess patient's mobility; develop plan if impaired  - Assess patient's need for assistive devices and provide as appropriate  - Encourage maximum independence but intervene and supervise when necessary  - Involve family in performance of ADLs  - Assess for home care needs following discharge   - Consider OT consult to assist with ADL evaluation and planning for discharge  - Provide patient education as appropriate  Outcome: Progressing  Goal: Maintains/Returns to pre admission functional level  Description: INTERVENTIONS:  - Perform BMAT or MOVE assessment daily    - Set and communicate daily mobility goal to care team and patient/family/caregiver  - Collaborate with rehabilitation services on mobility goals if consulted  - Perform Range of Motion three times a day    - Dangle patient three times a day  - Stand patient three times a day  - Ambulate patient three times a day  - Out of bed to chair three times a day   - Out of bed for meals three times a day  - Out of bed for toileting  - Record patient progress and toleration of activity level   Outcome: Progressing     Problem: DISCHARGE PLANNING  Goal: Discharge to home or other facility with appropriate resources  Description: INTERVENTIONS:  - Identify barriers to discharge w/patient and caregiver  - Arrange for needed discharge resources and transportation as appropriate  - Identify discharge learning needs (meds, wound care, etc )  - Arrange for interpretive services to assist at discharge as needed  - Refer to Case Management Department for coordinating discharge planning if the patient needs post-hospital services based on physician/advanced practitioner order or complex needs related to functional status, cognitive ability, or social support system  Outcome: Progressing     Problem: Knowledge Deficit  Goal: Patient/family/caregiver demonstrates understanding of disease process, treatment plan, medications, and discharge instructions  Description: Complete learning assessment and assess knowledge base    Interventions:  - Provide teaching at level of understanding  - Provide teaching via preferred learning methods  Outcome: Progressing     Problem: GASTROINTESTINAL - ADULT  Goal: Minimal or absence of nausea and/or vomiting  Description: INTERVENTIONS:  - Administer IV fluids if ordered to ensure adequate hydration  - Maintain NPO status until nausea and vomiting are resolved  - Nasogastric tube if ordered  - Administer ordered antiemetic medications as needed  - Provide nonpharmacologic comfort measures as appropriate  - Advance diet as tolerated, if ordered  - Consider nutrition services referral to assist patient with adequate nutrition and appropriate food choices  Outcome: Progressing  Goal: Maintains or returns to baseline bowel function  Description: INTERVENTIONS:  - Assess bowel function  - Encourage oral fluids to ensure adequate hydration  - Administer IV fluids if ordered to ensure adequate hydration  - Administer ordered medications as needed  - Encourage mobilization and activity  - Consider nutritional services referral to assist patient with adequate nutrition and appropriate food choices  Outcome: Progressing  Goal: Maintains adequate nutritional intake  Description: INTERVENTIONS:  - Monitor percentage of each meal consumed  - Identify factors contributing to decreased intake, treat as appropriate  - Assist with meals as needed  - Monitor I&O, weight, and lab values if indicated  - Obtain nutrition services referral as needed  Outcome: Progressing

## 2022-03-28 NOTE — H&P
[facility] [address] [place]  H&P- Bryan Castillo 1960, 57 y o  male MRN: [id number]  Unit/Bed#: 406-01 Encounter: [id number]  Primary Care Provider: Daryle Gordon, DO   Date and time admitted to hospital: 3/27/2022  2:51 PM    * Partial small bowel obstruction ([facility] [address] )  Assessment & Plan  · Patient presented with acute abdominal pain with nausea, vomiting  · CT scan showed partial small bowel obstruction  · ED provider discussed case with general surgery would like the patient admitted here for bowel rest, surgery consult  · NPO  · IV fluids  Isolyte 100 mL/hr  · NG-tube placed by ED  · Surgery consult  · Pain control with IV Dilaudid 0 5 mg Q 4 p r n  Pain    ROSANA (acute kidney injury) ([facility] )  Assessment & Plan  · Patient's creatinine was elevated at 1 54 upon admission  · Most likely a pre renal ROSANA due to dehydration secondary to decreased p o  Intake  · Will hold nephrotoxic medications including candesartan  · Will place on Isolyte 100 mL/hr  · Will monitor a m  Metabolic panel for improvement with administration of IV fluids  · May consider restarting candesartan if ROSANA improves overnight    Primary hypertension  Assessment & Plan  · BP upon admission is 166/98  · Will hold patient's home candesartan due to nephrotoxicity in light of ROSANA upon admission  · May consider restarting candesartan if ROSANA improves  · Hydralazine 5 mg IV q 6 p r n  Systolic Blood pressure above 160  · Will monitor blood pressures with regular vitals checks  · Expect some improvement with pain control    Mixed hyperlipidemia  Assessment & Plan  · Continue Lipitor 20 mg oral daily  · Continue Zetia 100 mg oral daily    Hypothyroidism  Assessment & Plan  · Continue levothyroxine 100 mcg is oral daily will recheck TSH level    VTE Pharmacologic Prophylaxis: VTE Score: 3 Moderate Risk (Score 3-4) - Pharmacological DVT Prophylaxis Ordered: heparin    Code Status: Level 1 - Full Code   Discussion with family: Patient declined call to   Anticipated Length of Stay: Patient will be admitted on an inpatient basis with an anticipated length of stay of greater than 2 midnights secondary to partial SBO   Total Time for Visit, including Counseling / Coordination of Care: 45 minutes Greater than 50% of this total time spent on direct patient counseling and coordination of care  Chief Complaint:  Abdominal pain    History of Present Illness:  Susy Bernstein is a 57 y o  male with a PMH of hypertension, GERD, hyperlipidemia, hypothyroidism who presents with abdominal pain x3 days  The patient states that 3 days ago, he began to feel nauseous as well as have a stabbing abdominal pain  He states that the pain is mostly in his right upper quadrant and left upper quadrant  He rates the pain a 5/10, it does not radiate anywhere and it is generally constant  He states that what is most distressing is the nausea, vomiting that he has been experiencing over the past several days  He has had no p o  Intake at home over the past several days as well  He denies a history of any previous surgeries, recent illnesses, sick contacts  He states that when he tries to eat he generally just vomits  He states that he has tried to switch his diet to just liquids but this did not help him  He has also tried at home pain medications including ibuprofen and Tylenol with no improvement  He also reports associated chills, diarrhea  He states that he has had several bowel movements the past 3 days  CT scan performed in the ED showed multiple loops of mild to moderate small bowel distention involving the jejunum and proximal ileum concerning for partial small-bowel obstruction  ED provider discussed case with General surgery on-call who recommended that the patient be admitted here NPO for bowel rest as well as receive IV fluids and have an NG tube placed  NG tube was placed in the ED    Patient denies any current chest pain, shortness of breath, headaches, dizziness, lightheadedness  Review of Systems:  Review of Systems   Constitutional: Positive for appetite change and chills  Negative for fatigue and fever  HENT: Negative for ear pain and sore throat  Eyes: Negative for pain and visual disturbance  Respiratory: Negative for cough, choking and shortness of breath  Cardiovascular: Negative for chest pain, palpitations and leg swelling  Gastrointestinal: Positive for abdominal pain, diarrhea, nausea and vomiting  Negative for blood in stool  Endocrine: Negative for polydipsia and polyuria  Genitourinary: Negative for dysuria, flank pain and hematuria  Musculoskeletal: Negative for arthralgias and back pain  Skin: Negative for color change, pallor and rash  Neurological: Negative for dizziness, seizures, syncope, weakness, light-headedness and headaches  Hematological: Negative for adenopathy  Does not bruise/bleed easily  Psychiatric/Behavioral: Negative for agitation and behavioral problems  All other systems reviewed and are negative  Past Medical and Surgical History:   Past Medical History:   Diagnosis Date    Disease of thyroid gland     GERD (gastroesophageal reflux disease)     Hyperlipidemia     Hypertension        Past Surgical History:   Procedure Laterality Date    CARPAL TUNNEL RELEASE      TONSILLECTOMY         Meds/Allergies:  Prior to Admission medications    Medication Sig Start Date End Date Taking?  Authorizing Provider   atorvastatin (LIPITOR) 20 mg tablet Take 20 mg by mouth daily    Historical Provider, MD   candesartan (ATACAND) 16 mg tablet Take 16 mg by mouth daily    Historical Provider, MD   ezetimibe (ZETIA) 10 mg tablet Take 10 mg by mouth daily    Historical Provider, MD   HYDROcodone-acetaminophen (NORCO) 5-325 mg per tablet Take 1 tablet by mouth every 6 (six) hours as needed for pain  Patient not taking: Reported on 3/27/2022     Historical Provider, MD ibuprofen (MOTRIN) 800 mg tablet Take 1 tablet (800 mg total) by mouth every 8 (eight) hours as needed for moderate pain  Patient not taking: Reported on 3/27/2022  5/14/21   Елеан Myles MD   levothyroxine 100 mcg tablet Take 100 mcg by mouth daily    Historical Provider, MD   lisinopril (ZESTRIL) 10 mg tablet Take 10 mg by mouth daily  Patient not taking: Reported on 3/27/2022     Historical Provider, MD   losartan (COZAAR) 50 mg tablet Take 50 mg by mouth  Patient not taking: Reported on 3/27/2022     Historical Provider, MD   methylprednisolone (MEDROL) 4 mg tablet Medrol dosepak, take as directed  Patient not taking: Reported on 12/27/2019 2/2/19   Shayla Saul PA-C   pantoprazole (PROTONIX) 40 mg tablet Take 40 mg by mouth daily    Historical Provider, MD   rosuvastatin (CRESTOR) 10 MG tablet Take by mouth  Patient not taking: Reported on 3/27/2022     Historical Provider, MD     I have reviewed home medications with patient personally      Allergies: No Known Allergies    Social History:  Marital Status: /Civil Union   Occupation: [employer]   Patient Pre-hospital Living Situation: Home, With spouse  Patient Pre-hospital Level of Mobility: walks  Patient Pre-hospital Diet Restrictions: None   Substance Use History:   Social History     Substance and Sexual Activity   Alcohol Use Yes    Comment: socially     Social History     Tobacco Use   Smoking Status Never Smoker   Smokeless Tobacco Never Used   Tobacco Comment    per [employer]-Former Smoker     Social History     Substance and Sexual Activity   Drug Use No    Comment: Denied use       Family History:  Family History   Problem Relation Age of Onset    Thyroid disease Mother     Heart attack Father     Heart attack Paternal Grandmother     Heart disease Paternal Grandmother         Pacemaker    Heart disease Paternal Grandfather        Physical Exam:     Vitals:   Blood Pressure: 160/89 (03/27/22 1956)  Pulse: 79 (03/27/22 1956)  Temperature: 98 2 °F (36 8 °C) (03/27/22 1505)  Temp Source: Tympanic (03/27/22 1505)  Respirations: 16 (03/27/22 1956)  Weight - Scale: 102 kg (224 lb 6 9 oz) (03/27/22 1505)  SpO2: 94 % (03/27/22 1956)    Physical Exam  Vitals and nursing note reviewed  Constitutional:       General: He is not in acute distress  Appearance: He is well-developed  He is obese  He is not ill-appearing  HENT:      Head: Normocephalic and atraumatic  Nose: Nose normal  No congestion or rhinorrhea  Mouth/Throat:      Mouth: Mucous membranes are dry  Pharynx: Oropharynx is clear  No oropharyngeal exudate or posterior oropharyngeal erythema  Eyes:      General: No scleral icterus  Right eye: No discharge  Left eye: No discharge  Extraocular Movements: Extraocular movements intact  Conjunctiva/sclera: Conjunctivae normal       Pupils: Pupils are equal, round, and reactive to light  Cardiovascular:      Rate and Rhythm: Normal rate and regular rhythm  Pulses: Normal pulses  Heart sounds: Normal heart sounds  No murmur heard  No friction rub  No gallop  Pulmonary:      Effort: Pulmonary effort is normal  No respiratory distress  Breath sounds: Normal breath sounds  No wheezing, rhonchi or rales  Abdominal:      General: Bowel sounds are decreased  There is no distension  Palpations: Abdomen is soft  Tenderness: There is abdominal tenderness in the right upper quadrant, epigastric area and left upper quadrant  There is no right CVA tenderness or left CVA tenderness  Musculoskeletal:      Cervical back: Normal range of motion and neck supple  Right lower leg: No edema  Left lower leg: No edema  Skin:     General: Skin is warm and dry  Neurological:      Mental Status: He is alert            Additional Data:     Lab Results:  Results from last 7 days   Lab Units 03/27/22 2011 03/27/22  1526 03/27/22  1526   WBC Thousand/uL  --   --  10 14 HEMOGLOBIN g/dL  --   --  19 0*   HEMATOCRIT %  --   --  55 8*   PLATELETS Thousands/uL 167   < > 167   NEUTROS PCT %  --   --  69   LYMPHS PCT %  --   --  15   MONOS PCT %  --   --  15*   EOS PCT %  --   --  1    < > = values in this interval not displayed  Results from last 7 days   Lab Units 03/27/22  1526   SODIUM mmol/L 141   POTASSIUM mmol/L 4 4   CHLORIDE mmol/L 103   CO2 mmol/L 28   BUN mg/dL 16   CREATININE mg/dL 1 54*   ANION GAP mmol/L 10   CALCIUM mg/dL 9 0   ALBUMIN g/dL 3 7   TOTAL BILIRUBIN mg/dL 0 65   ALK PHOS U/L 70   ALT U/L 32   AST U/L 20   GLUCOSE RANDOM mg/dL 114                 Results from last 7 days   Lab Units 03/27/22  1748 03/27/22  1526   LACTIC ACID mmol/L 1 0 2 8*       Imaging: Reviewed radiology reports from this admission including: abdominal/pelvic CT  CT abdomen pelvis with contrast   Final Result by Blanca Villavicencio MD (03/27 1729)         1  Multiple loops of mild-moderate small bowel distention involving the jejunum and proximal ileum concerning for partial small bowel obstruction as described above with slight transition point in the left mid abdomen  2   Diverticulosis without diverticulitis  3   Nonobstructing 11 mm right lower pole renal calculus  4   Additional findings as noted  The study was marked in [facility] for immediate notification  Workstation performed: [id number]             EKG and Other Studies Reviewed on Admission:   · EKG: NSR  HR 78    12 Lead EKG negative for ST elevations X3      ** Please Note: This note has been constructed using a voice recognition system   **

## 2022-03-29 PROBLEM — K92.1 BLACK STOOL: Status: ACTIVE | Noted: 2022-03-29

## 2022-03-29 LAB
ANION GAP SERPL CALCULATED.3IONS-SCNC: 10 MMOL/L (ref 4–13)
ATRIAL RATE: 84 BPM
BASOPHILS # BLD AUTO: 0.06 THOUSANDS/ΜL (ref 0–0.1)
BASOPHILS NFR BLD AUTO: 1 % (ref 0–1)
BUN SERPL-MCNC: 15 MG/DL (ref 5–25)
CALCIUM SERPL-MCNC: 8.6 MG/DL (ref 8.3–10.1)
CHLORIDE SERPL-SCNC: 104 MMOL/L (ref 100–108)
CO2 SERPL-SCNC: 26 MMOL/L (ref 21–32)
CREAT SERPL-MCNC: 1.08 MG/DL (ref 0.6–1.3)
EOSINOPHIL # BLD AUTO: 0.15 THOUSAND/ΜL (ref 0–0.61)
EOSINOPHIL NFR BLD AUTO: 2 % (ref 0–6)
ERYTHROCYTE [DISTWIDTH] IN BLOOD BY AUTOMATED COUNT: 13.3 % (ref 11.6–15.1)
GFR SERPL CREATININE-BSD FRML MDRD: 73 ML/MIN/1.73SQ M
GLUCOSE SERPL-MCNC: 92 MG/DL (ref 65–140)
HCT VFR BLD AUTO: 50 % (ref 36.5–49.3)
HCT VFR BLD AUTO: 51.8 % (ref 36.5–49.3)
HEMOCCULT STL QL: POSITIVE
HGB BLD-MCNC: 16.5 G/DL (ref 12–17)
HGB BLD-MCNC: 17.2 G/DL (ref 12–17)
IMM GRANULOCYTES # BLD AUTO: 0.02 THOUSAND/UL (ref 0–0.2)
IMM GRANULOCYTES NFR BLD AUTO: 0 % (ref 0–2)
LYMPHOCYTES # BLD AUTO: 1.64 THOUSANDS/ΜL (ref 0.6–4.47)
LYMPHOCYTES NFR BLD AUTO: 18 % (ref 14–44)
MAGNESIUM SERPL-MCNC: 2 MG/DL (ref 1.6–2.6)
MCH RBC QN AUTO: 30.9 PG (ref 26.8–34.3)
MCHC RBC AUTO-ENTMCNC: 33.2 G/DL (ref 31.4–37.4)
MCV RBC AUTO: 93 FL (ref 82–98)
MONOCYTES # BLD AUTO: 1.08 THOUSAND/ΜL (ref 0.17–1.22)
MONOCYTES NFR BLD AUTO: 12 % (ref 4–12)
NEUTROPHILS # BLD AUTO: 6.12 THOUSANDS/ΜL (ref 1.85–7.62)
NEUTS SEG NFR BLD AUTO: 67 % (ref 43–75)
NRBC BLD AUTO-RTO: 0 /100 WBCS
OB PNL GAST: POSITIVE
P AXIS: 5 DEGREES
PLATELET # BLD AUTO: 210 THOUSANDS/UL (ref 149–390)
PMV BLD AUTO: 10.9 FL (ref 8.9–12.7)
POTASSIUM SERPL-SCNC: 4.7 MMOL/L (ref 3.5–5.3)
PR INTERVAL: 190 MS
QRS AXIS: 263 DEGREES
QRSD INTERVAL: 72 MS
QT INTERVAL: 342 MS
QTC INTERVAL: 404 MS
RBC # BLD AUTO: 5.57 MILLION/UL (ref 3.88–5.62)
SODIUM SERPL-SCNC: 140 MMOL/L (ref 136–145)
T WAVE AXIS: 47 DEGREES
VENTRICULAR RATE: 84 BPM
WBC # BLD AUTO: 9.07 THOUSAND/UL (ref 4.31–10.16)

## 2022-03-29 PROCEDURE — 82272 OCCULT BLD FECES 1-3 TESTS: CPT

## 2022-03-29 PROCEDURE — C9113 INJ PANTOPRAZOLE SODIUM, VIA: HCPCS

## 2022-03-29 PROCEDURE — 85025 COMPLETE CBC W/AUTO DIFF WBC: CPT | Performed by: INTERNAL MEDICINE

## 2022-03-29 PROCEDURE — 85014 HEMATOCRIT: CPT | Performed by: INTERNAL MEDICINE

## 2022-03-29 PROCEDURE — 80048 BASIC METABOLIC PNL TOTAL CA: CPT | Performed by: INTERNAL MEDICINE

## 2022-03-29 PROCEDURE — 82271 OCCULT BLOOD OTHER SOURCES: CPT

## 2022-03-29 PROCEDURE — 99233 SBSQ HOSP IP/OBS HIGH 50: CPT | Performed by: INTERNAL MEDICINE

## 2022-03-29 PROCEDURE — 83735 ASSAY OF MAGNESIUM: CPT | Performed by: INTERNAL MEDICINE

## 2022-03-29 PROCEDURE — NC001 PR NO CHARGE

## 2022-03-29 PROCEDURE — 85018 HEMOGLOBIN: CPT | Performed by: INTERNAL MEDICINE

## 2022-03-29 PROCEDURE — 99232 SBSQ HOSP IP/OBS MODERATE 35: CPT | Performed by: SURGERY

## 2022-03-29 PROCEDURE — 93010 ELECTROCARDIOGRAM REPORT: CPT | Performed by: INTERNAL MEDICINE

## 2022-03-29 RX ADMIN — PANTOPRAZOLE SODIUM 40 MG: 40 INJECTION, POWDER, FOR SOLUTION INTRAVENOUS at 20:54

## 2022-03-29 RX ADMIN — HYDRALAZINE HYDROCHLORIDE 5 MG: 20 INJECTION, SOLUTION INTRAMUSCULAR; INTRAVENOUS at 17:31

## 2022-03-29 RX ADMIN — EZETIMIBE 10 MG: 10 TABLET ORAL at 08:00

## 2022-03-29 RX ADMIN — ATORVASTATIN CALCIUM 20 MG: 10 TABLET, FILM COATED ORAL at 08:00

## 2022-03-29 RX ADMIN — HYDRALAZINE HYDROCHLORIDE 5 MG: 20 INJECTION, SOLUTION INTRAMUSCULAR; INTRAVENOUS at 05:19

## 2022-03-29 RX ADMIN — SODIUM CHLORIDE, SODIUM GLUCONATE, SODIUM ACETATE, POTASSIUM CHLORIDE, MAGNESIUM CHLORIDE, SODIUM PHOSPHATE, DIBASIC, AND POTASSIUM PHOSPHATE 100 ML/HR: .53; .5; .37; .037; .03; .012; .00082 INJECTION, SOLUTION INTRAVENOUS at 13:32

## 2022-03-29 RX ADMIN — PANTOPRAZOLE SODIUM 40 MG: 40 INJECTION, POWDER, FOR SOLUTION INTRAVENOUS at 08:00

## 2022-03-29 NOTE — QUICK NOTE
Patient had a 3rd black tarry bowel movement today just now  He denies any abdominal pain  Personally discussed with Dr Matt Brink  Obstruction series yesterday still showed partial small-bowel obstruction with some improvement in the dilatation of the small intestine  Patient denies abdominal pain  No nausea or vomiting  He has been tolerating surgical soft diet since this morning  He would like to be discharged today, however in discussion with the attending hospitalist there is concern that on abdominal exam he has very infrequent actually inactive bowel sounds  He denies any hematemesis  Black tarry semi formed stool was noted in the patient's toilet  He denies any headache, no dizziness or chest pain  Hemoglobin this morning was stable 17 02  Despite the patient's insistence that he would like to be discharged home today, there is still concerned that the partial small-bowel obstruction may not be entirely resolved  Also in light of hematochezia with 3 tarry bowel movements today, our recommendation is right now to hold hospital discharge today, keep surgical soft diet as tolerated  If he starts to have nausea or vomiting, then would need reinsert shin of nasogastric tube and nothing by mouth status  In that situation probably would proceed with obtaining a fluoroscopy Gastrografin study tomorrow should persistent small bowel obstruction be on resolved  Abdomen is nondistended  No bowel sounds are heard auscultated twice an hour apart on the patient  There is no specific guarding or tenderness, no masses  No peritoneal signs  He last had an outpatient colonoscopy performed in [id number] [place], [place] [address] about a year ago which the patient reports as negative  He has no history of ulcer disease, he does not consume alcohol  No use of NSAIDs or aspirin products  Gastric NG emesis and stool for occult blood both were positive  Possibly secondary to traumatic NG tube injury    No nausea or vomiting at present  He is entirely comfortable, actually in his bathroom shaving at present  In addition, personal discussion with Dr Evelyn Sesay, gastroenterologist, was conducted by this provider regarding proposed management and care of the patient at this time  He said to continue to observe, hemoglobin remains stable  He had recommended follow-up with Gastroenterology in 1-2 weeks after hospital discharge, no immediate need for any endoscopy intervention and continue to monitor hemoglobin  Formal consultation to Gastroenterology was canceled at his request, also updated with Dr Stephanie Flores  The patient was agreeable to remain in the hospital another day  Monitor hemoglobin in the morning      Sy Roberson PA-C

## 2022-03-29 NOTE — PROGRESS NOTES
Progress Note - General Surgery   Italia Castillo 57 y o  male MRN: [id number]  Unit/Bed#: 406-01 Encounter: [id number]    Assessment:  NG tube last evening was noted with some bloody drainage, blocked and unable to be suction at bedside  NG tube was removed  Patient is passing flatus  He notes that he had a "black" loose watery stool this morning  Discussed by overnight medicine PA with on-call general surgeon, patient started on IV Protonix b i d   No prior history of any ulcer disease or GI bleeds  Probable blood in the NG tube from nasal pharyngeal trauma of nasogastric tube insertion  Hemoglobin stable 17 02     Obstruction series yesterday showed dilated loops of mid small intestine with normal caliber distal ileum and colon, consistent with partial small-bowel obstruction  Apparent decrease in the degree of small bowel distension with comparison to CT abdomen pelvis the day before  Incidental finding of 7 mm calculus in lower pole of the right kidney  This morning patient without nausea or vomiting  Denies any abdominal pain, continues to pass flatus  Plan:  Discussed with Dr Joel Vallejo  Will advance surgical soft diet  Probable discharge home from the hospital early this afternoon  Appears stable from a surgical standpoint  Will discuss with Dr Rebolledo, attending hospitalist physician regarding proposed management  Analgesics and antiemetics as ordered p r n  No need for NG tube reinsertion  Will Hemoccult test stool and also gastric emesis in suction canister for occult blood  Also discussed with Dr Cruz Newby, gastroenterologist   His instructions were that if the patient's hemoglobin was stable the patient remains asymptomatic that he can be discharged home and follow-up as an outpatient with Gastroenterology in 2 weeks in the office  Patient was formally seen and had prior colonoscopies by Dr Taty Andre in [place]    The patient will be given the option either to follow-up with Gastroenterology here or with Dr Inocencio York hospital discharge  Subjective/Objective   Chief Complaint:  Patient had soft black bowel movement this morning which she flushed in the toilet  A 2nd loose watery bowel movement was retained and appears black  Subjective:  Late last evening the NG tube was blocked  There was some bloody drainage in the tube, attempt by the medicine AP to unblock or suction was unsuccessful  Contact with the on-call general surgeon instructed to remove the NG tube and observe the patient  He was started on Protonix IV b i d   Patient denies any nausea or vomiting  Passing flatus  Black loose bowel movement earlier this morning  He denies any abdominal pain  No eructation or belching  Patient has no prior history of aspirin or NSAID use  No ulcer history  Patient had 3 colonoscopies in the past by Dr Chandan Correa, gastroenterologist in [place]  Right now the patient is entirely pain free  He is hungry and asking to eat  The patient feels comfortable sitting up and would like to be discharged from the hospital   Personal discussion was conducted with Dr Cory Duran, gastroenterologist here, he instructed that if the patient's hemoglobin was stable and he was not experiencing any further nausea vomiting or black bowel movements, then the patient can be discharged and followed up by Gastroenterology as an outpatient  Patient denies any headache, lightheadedness or dizziness  No chest pain or shortness of breath  He has been afebrile and vital signs stable overnight        Scheduled Meds:  Current Facility-Administered Medications   Medication Dose Route Frequency Provider Last Rate    atorvastatin  20 mg Oral Daily Rosi PATTIE Gonzalez      ezetimibe  10 mg Oral Daily Rosi CurePATTIE      hydrALAZINE  5 mg Intravenous Q6H [facility] Davi Guardado PA-C      HYDROmorphone  0 5 mg Intravenous Q4H PRN Rosi Gonzalez PA-C      levothyroxine  100 mcg Oral Early Morning Analilia Rudolph PA-C      multi-electrolyte  100 mL/hr Intravenous Continuous Analilia Rudolph PA-C 100 mL/hr (03/28/22 2333)    ondansetron  4 mg Intravenous Q6H PRN Analilia Rudolph PA-C      pantoprazole  40 mg Intravenous Q12H [address] Davi Guardado PA-C      phenol  1 spray Mouth/Throat Q2H PRN Thomas Berry MD       Continuous Infusions:multi-electrolyte, 100 mL/hr, Last Rate: 100 mL/hr (03/28/22 2333)      PRN Meds:  HYDROmorphone    ondansetron    phenol    Objective:     Blood pressure 131/92, pulse 95, temperature 98 4 °F (36 9 °C), temperature source Oral, resp  rate 16, height 5' 10" (1 778 m), weight 100 kg (220 lb 14 4 oz), SpO2 96 %  ,Body mass index is 31 7 kg/m²  Intake/Output Summary (Last 24 hours) at 3/29/2022 1029  Last data filed at 3/29/2022 0851  Gross per 24 hour   Intake 1360 ml   Output 1165 ml   Net 195 ml       Invasive Devices  Report    Peripheral Intravenous Line            Peripheral IV 03/27/22 Left Antecubital 1 day                Physical Exam:     Awake alert  No distress  Patient is asking if he can be discharged from the hospital   Skin warm dry to touch, well tanned  Normocephalic, male balding pattern  ENT oral mucosa pink and moist   Nares without any evidence of trauma or epistaxis  Heart regular rate and rhythm, no tachycardia  No murmur heard  Lungs clear to auscultation  Back no flank tenderness to percussion  Abdomen is nondistended, normal bowel sounds, abdomen is soft  No epigastric tenderness  No guarding or rebound  Liver edge not palpable  No abdominal hernias are felt  Extremities without calf tenderness, no peripheral edema  Ambulation not observed  Mental status normal, no focal motor or sensory neurologic weakness is apparent  He is fully oriented x 3  Lab, Imaging and other studies:  I have personally reviewed pertinent lab results    , CBC:   Lab Results Component Value Date    WBC 9 07 03/29/2022    HGB 17 2 (H) 03/29/2022    HCT 51 8 (H) 03/29/2022    MCV 93 03/29/2022     03/29/2022    MCH 30 9 03/29/2022    MCHC 33 2 03/29/2022    RDW 13 3 03/29/2022    MPV 10 9 03/29/2022    NRBC 0 03/29/2022   , CMP:   Lab Results   Component Value Date    SODIUM 140 03/29/2022    K 4 7 03/29/2022     03/29/2022    CO2 26 03/29/2022    BUN 15 03/29/2022    CREATININE 1 08 03/29/2022    CALCIUM 8 6 03/29/2022    EGFR 73 03/29/2022   , Coagulation: No results found for: PT, INR, APTT     OBSTRUCTION SERIES     INDICATION:   PSBO      COMPARISON:  CT abdomen and pelvis from March 27, 2022     EXAM PERFORMED/VIEWS:  XR ABDOMEN OBSTRUCTION SERIES        FINDINGS:     Tip of nasogastric tube in gastric fundus      Multiple distended loops of small intestine in the central abdomen  Scattered gas in normal caliber colon  Small amount of enteric contrast from yesterday's CT, now in the proximal colon and distal ileum      No evidence of pneumoperitoneum      7 mm calculus in the lower pole the right kidney  No evidence of additional abnormal calcification  Bladder opacified by contrast administered intravenously for yesterday's CT       Degenerative changes in the spine  Imaged bones otherwise unremarkable      Examination of the chest reveals a normal cardiomediastinal silhouette  Lungs are clear      IMPRESSION:     Dilated loops of mid small intestine with normal caliber distal ileum and colon, consistent with partial distal small bowel obstruction  Apparent decrease in the degree of distended small bowel since a CT from 3/27/2022      7 mm calculus in the lower pole of the right kidney      VTE Pharmacologic Prophylaxis: Reason for no pharmacologic prophylaxis Possible GI bleed    VTE Mechanical Prophylaxis: sequential compression device    Vin Gaitan PA-C

## 2022-03-29 NOTE — ASSESSMENT & PLAN NOTE
BP upon admission is 166/98 - currently stable in the 140's  Currently to hold ARB  And monitor blood pressure, with IV hydralazine as needed

## 2022-03-29 NOTE — PLAN OF CARE
Problem: PAIN - ADULT  Goal: Verbalizes/displays adequate comfort level or baseline comfort level  Description: Interventions:  - Encourage patient to monitor pain and request assistance  - Assess pain using appropriate pain scale  - Administer analgesics based on type and severity of pain and evaluate response  - Implement non-pharmacological measures as appropriate and evaluate response  - Consider cultural and social influences on pain and pain management  - Notify physician/advanced practitioner if interventions unsuccessful or patient reports new pain  Outcome: Progressing     Problem: INFECTION - ADULT  Goal: Absence or prevention of progression during hospitalization  Description: INTERVENTIONS:  - Assess and monitor for signs and symptoms of infection  - Monitor lab/diagnostic results  - Monitor all insertion sites, i e  indwelling lines, tubes, and drains  - [place] appropriate cooling/warming therapies per order  - Administer medications as ordered  - Instruct and encourage patient and family to use good hand hygiene technique  - Identify and instruct in appropriate isolation precautions for identified infection/condition  Outcome: Progressing     Problem: SAFETY ADULT  Goal: Patient will remain free of falls  Description: INTERVENTIONS:  - Educate patient/family on patient safety including physical limitations  - Instruct patient to call for assistance with activity   - Consult OT/PT to assist with strengthening/mobility   - Keep Call bell within reach  - Keep bed low and locked with side rails adjusted as appropriate  - Keep care items and personal belongings within reach  - Initiate and maintain comfort rounds  - Make Fall Risk Sign visible to staff  - Offer Toileting every two Hours, in advance of need  - Obtain necessary fall risk management equipment: non slip socks   - Apply yellow socks and bracelet for high fall risk patients  - Consider moving patient to room near nurses station  Outcome: Progressing  Goal: Maintain or return to baseline ADL function  Description: INTERVENTIONS:  -  Assess patient's ability to carry out ADLs; assess patient's baseline for ADL function and identify physical deficits which impact ability to perform ADLs (bathing, care of mouth/teeth, toileting, grooming, dressing, etc )  - Assess/evaluate cause of self-care deficits   - Assess range of motion  - Assess patient's mobility; develop plan if impaired  - Assess patient's need for assistive devices and provide as appropriate  - Encourage maximum independence but intervene and supervise when necessary  - Involve family in performance of ADLs  - Assess for home care needs following discharge   - Consider OT consult to assist with ADL evaluation and planning for discharge  - Provide patient education as appropriate  Outcome: Progressing  Goal: Maintains/Returns to pre admission functional level  Description: INTERVENTIONS:  - Perform BMAT or MOVE assessment daily    - Set and communicate daily mobility goal to care team and patient/family/caregiver  - Collaborate with rehabilitation services on mobility goals if consulted  - Perform Range of Motion three times a day    - Dangle patient three times a day  - Stand patient three times a day  - Ambulate patient three times a day  - Out of bed to chair three times a day   - Out of bed for meals three times a day  - Out of bed for toileting  - Record patient progress and toleration of activity level   Outcome: Progressing     Problem: DISCHARGE PLANNING  Goal: Discharge to home or other facility with appropriate resources  Description: INTERVENTIONS:  - Identify barriers to discharge w/patient and caregiver  - Arrange for needed discharge resources and transportation as appropriate  - Identify discharge learning needs (meds, wound care, etc )  - Arrange for interpretive services to assist at discharge as needed  - Refer to Case Management Department for coordinating discharge planning if the patient needs post-hospital services based on physician/advanced practitioner order or complex needs related to functional status, cognitive ability, or social support system  Outcome: Progressing     Problem: Knowledge Deficit  Goal: Patient/family/caregiver demonstrates understanding of disease process, treatment plan, medications, and discharge instructions  Description: Complete learning assessment and assess knowledge base    Interventions:  - Provide teaching at level of understanding  - Provide teaching via preferred learning methods  Outcome: Progressing     Problem: GASTROINTESTINAL - ADULT  Goal: Minimal or absence of nausea and/or vomiting  Description: INTERVENTIONS:  - Administer IV fluids if ordered to ensure adequate hydration  - Maintain NPO status until nausea and vomiting are resolved  - Nasogastric tube if ordered  - Administer ordered antiemetic medications as needed  - Provide nonpharmacologic comfort measures as appropriate  - Advance diet as tolerated, if ordered  - Consider nutrition services referral to assist patient with adequate nutrition and appropriate food choices  Outcome: Progressing  Goal: Maintains or returns to baseline bowel function  Description: INTERVENTIONS:  - Assess bowel function  - Encourage oral fluids to ensure adequate hydration  - Administer IV fluids if ordered to ensure adequate hydration  - Administer ordered medications as needed  - Encourage mobilization and activity  - Consider nutritional services referral to assist patient with adequate nutrition and appropriate food choices  Outcome: Progressing  Goal: Maintains adequate nutritional intake  Description: INTERVENTIONS:  - Monitor percentage of each meal consumed  - Identify factors contributing to decreased intake, treat as appropriate  - Assist with meals as needed  - Monitor I&O, weight, and lab values if indicated  - Obtain nutrition services referral as needed  Outcome: Progressing Problem: Nutrition/Hydration-ADULT  Goal: Nutrient/Hydration intake appropriate for improving, restoring or maintaining nutritional needs  Description: Monitor and assess patient's nutrition/hydration status for malnutrition  Collaborate with interdisciplinary team and initiate plan and interventions as ordered  Monitor patient's weight and dietary intake as ordered or per policy  Utilize nutrition screening tool and intervene as necessary  Determine patient's food preferences and provide high-protein, high-caloric foods as appropriate       INTERVENTIONS:  - Monitor oral intake, urinary output, labs, and treatment plans  - Assess nutrition and hydration status and recommend course of action  - Evaluate amount of meals eaten  - Assist patient with eating if necessary   - Allow adequate time for meals  - Recommend/ encourage appropriate diets, oral nutritional supplements, and vitamin/mineral supplements  - Order, calculate, and assess calorie counts as needed  - Recommend, monitor, and adjust tube feedings and TPN/PPN based on assessed needs  - Assess need for intravenous fluids  - Provide specific nutrition/hydration education as appropriate  - Include patient/family/caregiver in decisions related to nutrition  Outcome: Progressing

## 2022-03-29 NOTE — ASSESSMENT & PLAN NOTE
Development of black tarry stool, heme-positive gastric contents and stool  Patient reports multiple colonoscopies in the past, with the last being approximately 1 year ago at an outside institution  Also with traumatic NG tube placement, with reported bleeding upon removal this morning  Current symptoms may represent an upper GI bleed, potentially in the setting of traumatic NG tube insertion and removal     Continue IV PPI BID, trend hemoglobin, and transfuse as necessary  Surgery apparently discussed case with GI, who did not feel urgent intervention was required  Labs and exam in consistent with ischemic colitis  Monitor closely

## 2022-03-29 NOTE — PLAN OF CARE
Problem: PAIN - ADULT  Goal: Verbalizes/displays adequate comfort level or baseline comfort level  Description: Interventions:  - Encourage patient to monitor pain and request assistance  - Assess pain using appropriate pain scale  - Administer analgesics based on type and severity of pain and evaluate response  - Implement non-pharmacological measures as appropriate and evaluate response  - Consider cultural and social influences on pain and pain management  - Notify physician/advanced practitioner if interventions unsuccessful or patient reports new pain  Outcome: Progressing     Problem: INFECTION - ADULT  Goal: Absence or prevention of progression during hospitalization  Description: INTERVENTIONS:  - Assess and monitor for signs and symptoms of infection  - Monitor lab/diagnostic results  - Monitor all insertion sites, i e  indwelling lines, tubes, and drains  - [place] appropriate cooling/warming therapies per order  - Administer medications as ordered  - Instruct and encourage patient and family to use good hand hygiene technique  - Identify and instruct in appropriate isolation precautions for identified infection/condition  Outcome: Progressing     Problem: SAFETY ADULT  Goal: Patient will remain free of falls  Description: INTERVENTIONS:  - Educate patient/family on patient safety including physical limitations  - Instruct patient to call for assistance with activity   - Consult OT/PT to assist with strengthening/mobility   - Keep Call bell within reach  - Keep bed low and locked with side rails adjusted as appropriate  - Keep care items and personal belongings within reach  - Initiate and maintain comfort rounds  - Make Fall Risk Sign visible to staff  - Offer Toileting every two Hours, in advance of need  - Obtain necessary fall risk management equipment: non slip socks   - Apply yellow socks and bracelet for high fall risk patients  - Consider moving patient to room near nurses station  Outcome: Progressing  Goal: Maintain or return to baseline ADL function  Description: INTERVENTIONS:  -  Assess patient's ability to carry out ADLs; assess patient's baseline for ADL function and identify physical deficits which impact ability to perform ADLs (bathing, care of mouth/teeth, toileting, grooming, dressing, etc )  - Assess/evaluate cause of self-care deficits   - Assess range of motion  - Assess patient's mobility; develop plan if impaired  - Assess patient's need for assistive devices and provide as appropriate  - Encourage maximum independence but intervene and supervise when necessary  - Involve family in performance of ADLs  - Assess for home care needs following discharge   - Consider OT consult to assist with ADL evaluation and planning for discharge  - Provide patient education as appropriate  Outcome: Progressing  Goal: Maintains/Returns to pre admission functional level  Description: INTERVENTIONS:  - Perform BMAT or MOVE assessment daily    - Set and communicate daily mobility goal to care team and patient/family/caregiver  - Collaborate with rehabilitation services on mobility goals if consulted  - Perform Range of Motion three times a day    - Dangle patient three times a day  - Stand patient three times a day  - Ambulate patient three times a day  - Out of bed to chair three times a day   - Out of bed for meals three times a day  - Out of bed for toileting  - Record patient progress and toleration of activity level   Outcome: Progressing     Problem: DISCHARGE PLANNING  Goal: Discharge to home or other facility with appropriate resources  Description: INTERVENTIONS:  - Identify barriers to discharge w/patient and caregiver  - Arrange for needed discharge resources and transportation as appropriate  - Identify discharge learning needs (meds, wound care, etc )  - Arrange for interpretive services to assist at discharge as needed  - Refer to Case Management Department for coordinating discharge planning if the patient needs post-hospital services based on physician/advanced practitioner order or complex needs related to functional status, cognitive ability, or social support system  Outcome: Progressing     Problem: Knowledge Deficit  Goal: Patient/family/caregiver demonstrates understanding of disease process, treatment plan, medications, and discharge instructions  Description: Complete learning assessment and assess knowledge base    Interventions:  - Provide teaching at level of understanding  - Provide teaching via preferred learning methods  Outcome: Progressing     Problem: GASTROINTESTINAL - ADULT  Goal: Minimal or absence of nausea and/or vomiting  Description: INTERVENTIONS:  - Administer IV fluids if ordered to ensure adequate hydration  - Maintain NPO status until nausea and vomiting are resolved  - Nasogastric tube if ordered  - Administer ordered antiemetic medications as needed  - Provide nonpharmacologic comfort measures as appropriate  - Advance diet as tolerated, if ordered  - Consider nutrition services referral to assist patient with adequate nutrition and appropriate food choices  Outcome: Progressing  Goal: Maintains or returns to baseline bowel function  Description: INTERVENTIONS:  - Assess bowel function  - Encourage oral fluids to ensure adequate hydration  - Administer IV fluids if ordered to ensure adequate hydration  - Administer ordered medications as needed  - Encourage mobilization and activity  - Consider nutritional services referral to assist patient with adequate nutrition and appropriate food choices  Outcome: Progressing  Goal: Maintains adequate nutritional intake  Description: INTERVENTIONS:  - Monitor percentage of each meal consumed  - Identify factors contributing to decreased intake, treat as appropriate  - Assist with meals as needed  - Monitor I&O, weight, and lab values if indicated  - Obtain nutrition services referral as needed  Outcome: Progressing Problem: Nutrition/Hydration-ADULT  Goal: Nutrient/Hydration intake appropriate for improving, restoring or maintaining nutritional needs  Description: Monitor and assess patient's nutrition/hydration status for malnutrition  Collaborate with interdisciplinary team and initiate plan and interventions as ordered  Monitor patient's weight and dietary intake as ordered or per policy  Utilize nutrition screening tool and intervene as necessary  Determine patient's food preferences and provide high-protein, high-caloric foods as appropriate       INTERVENTIONS:  - Monitor oral intake, urinary output, labs, and treatment plans  - Assess nutrition and hydration status and recommend course of action  - Evaluate amount of meals eaten  - Assist patient with eating if necessary   - Allow adequate time for meals  - Recommend/ encourage appropriate diets, oral nutritional supplements, and vitamin/mineral supplements  - Order, calculate, and assess calorie counts as needed  - Recommend, monitor, and adjust tube feedings and TPN/PPN based on assessed needs  - Assess need for intravenous fluids  - Provide specific nutrition/hydration education as appropriate  - Include patient/family/caregiver in decisions related to nutrition  Outcome: Progressing     Problem: HEMATOLOGIC - ADULT  Goal: Maintains hematologic stability  Description: INTERVENTIONS  - Assess for signs and symptoms of bleeding or hemorrhage  - Monitor labs  - Administer supportive blood products/factors as ordered and appropriate  Outcome: Progressing

## 2022-03-29 NOTE — UTILIZATION REVIEW
Continued Stay Review    Date: 3/29                         Current Patient Class:  IP   Current Level of Care:  MS     HPI:61 y o  male initially admitted on  3/27   For treatment of  Partial SBO w/pre renal ROSANA: conservative management w/ NGT , IVF       3/28   lactic acidosis  POA  (2 8)  Resolved w/IVF  Low urine output past 24 hours, suspect bladder outlet obstruction  (h/o prostate enlargement)   Will start flomax, ck post void residuals  3/28 late evening,  Blood drng noted in NGT: initiated IV PPI, HOLD anticoag (heparin)  DC NGT,  Cont ivf , keep NPO     3/29 Obstruction series yesterday still showed partial small-bowel obstruction with some improvement in the dilatation of the small intestine  Patient denies abdominal pain and nausea: Tolerating surgical soft diet since this am    Bowel sounds remain infrequent - Black tarry semi formed stool today -    hgb   17  IVF dc today   PER GI :    Concerned that the partial small-bowel obstruction may not be entirely resolved  Also in light of hematochezia with 3 tarry bowel movements today, our recommendation is right now to hold hospital discharge today, keep surgical soft diet as tolerated  Repeat hgb in AM      Vital Signs:   03/29/22 15:23:31 99 9 °F (37 7 °C) 89 18 142/95 111 97 % None (Room air) Sitting   03/29/22 12:53:12 -- -- -- 144/92 109            Pertinent Labs/Diagnostic Results:   3/28  OBS  SERIES:   Dilated loops of mid small intestine with normal caliber distal ileum and colon, consistent with partial distal small bowel obstruction   Apparent decrease in the degree of distended small bowel since a CT from 3/27/2022    7 mm calculus in the lower pole of the right kidney         Results from last 7 days   Lab Units 03/27/22  1526   SARS-COV-2  Negative     Results from last 7 days   Lab Units 03/29/22  0636 03/28/22  0437 03/27/22 2011 03/27/22  1526 03/27/22  1526   WBC Thousand/uL 9 07 8 42  --   --  10 14   HEMOGLOBIN g/dL 17 2* 16  8  --   --  19 0*   HEMATOCRIT % 51 8* 50 4*  --   --  55 8*   PLATELETS Thousands/uL 210 186 167   < > 167   NEUTROS ABS Thousands/µL 6 12 5 42  --   --  6 94    < > = values in this interval not displayed           Results from last 7 days   Lab Units 03/29/22  0636 03/28/22  0437 03/27/22  1526   SODIUM mmol/L 140 139 141   POTASSIUM mmol/L 4 7 3 9 4 4   CHLORIDE mmol/L 104 104 103   CO2 mmol/L 26 25 28   ANION GAP mmol/L 10 10 10   BUN mg/dL 15 14 16   CREATININE mg/dL 1 08 1 13 1 54*   EGFR ml/min/1 73sq m 73 69 47   CALCIUM mg/dL 8 6 8 2* 9 0   MAGNESIUM mg/dL 2 0  --   --      Results from last 7 days   Lab Units 03/28/22  0437 03/27/22  1526   AST U/L 17 20   ALT U/L 23 32   ALK PHOS U/L 56 70   TOTAL PROTEIN g/dL 6 6 7 9   ALBUMIN g/dL 3 0* 3 7   TOTAL BILIRUBIN mg/dL 0 72 0 65   BILIRUBIN DIRECT mg/dL  --  0 10         Results from last 7 days   Lab Units 03/29/22  0636 03/28/22  0437 03/27/22  1526   GLUCOSE RANDOM mg/dL 92 97 114       Results from last 7 days   Lab Units 03/27/22 2011 03/27/22  1748 03/27/22  1526   HS TNI 0HR ng/L  --   --  21   HS TNI 2HR ng/L  --  36  --    HSTNI D2 ng/L  --  15  --    HS TNI 4HR ng/L 28  --   --    HSTNI D4 ng/L 7  --   --          Results from last 7 days   Lab Units 03/28/22  0437   PTT seconds 32     Results from last 7 days   Lab Units 03/28/22  0437   TSH 3RD GENERATON uIU/mL 3 370         Results from last 7 days   Lab Units 03/27/22 1748 03/27/22  1526   LACTIC ACID mmol/L 1 0 2 8*             Results from last 7 days   Lab Units 03/27/22  1526   NT-PRO BNP pg/mL 68             Results from last 7 days   Lab Units 03/27/22  1526   LIPASE u/L 95                 Results from last 7 days   Lab Units 03/28/22  1101   CLARITY UA  Clear   COLOR UA  Yellow   SPEC GRAV UA  1 025   PH UA  6 5   GLUCOSE UA mg/dl Negative   KETONES UA mg/dl >=80 (3+)*   BLOOD UA  Trace-Intact*   PROTEIN UA mg/dl Trace*   NITRITE UA  Negative   BILIRUBIN UA  Negative   UROBILINOGEN UA E U /dl 0 2   LEUKOCYTES UA  Negative   WBC UA /hpf 0-1   RBC UA /hpf 1-2   BACTERIA UA /hpf Occasional   EPITHELIAL CELLS WET PREP /hpf Occasional     Results from last 7 days   Lab Units 03/27/22  1526   INFLUENZA A PCR  Negative   INFLUENZA B PCR  Negative   RSV PCR  Negative         Medications:   Scheduled Medications:  atorvastatin, 20 mg, Oral, Daily  ezetimibe, 10 mg, Oral, Daily  hydrALAZINE, 5 mg, Intravenous, Q6H [address]  levothyroxine, 100 mcg, Oral, Early Morning  pantoprazole, 40 mg, Intravenous, Q12H KYLE      Continuous IV Infusions:     PRN Meds:  HYDROmorphone, 0 5 mg, Intravenous, Q4H PRN  ondansetron, 4 mg, Intravenous, Q6H PRN  phenol, 1 spray, Mouth/Throat, Q2H PRN        Discharge Plan: d    Network Utilization Review Department  ATTENTION: Please call with any questions or concerns to [phone] and carefully listen to the prompts so that you are directed to the right person  All voicemails are confidential   Radha Chow all requests for admission clinical reviews, approved or denied determinations and any other requests to dedicated fax number below belonging to the campus where the patient is receiving treatment   List of dedicated fax numbers for the Facilities:  [address] DENIALS (Administrative/Medical Necessity) [phone]   [address] (Maternity/NICU/Pediatrics) [phone]   [address] [place] [address] [address] [facility] [place] [facility] [place] [address] [id number] [address]   Evelyn [place] [address] [phone]   [place] [address] [place] [address] [phone]

## 2022-03-29 NOTE — PROGRESS NOTES
[address] [facility] [address]  Progress Note Saranya Castillo 1960, 57 y o  male MRN: [id number]  Unit/Bed#: 406-01 Encounter: [id number]  Primary Care Provider: Luz Xiong,    Date and time admitted to hospital: 3/27/2022  2:51 PM    * Partial small bowel obstruction (HCC)  Assessment & Plan  Partial small-bowel obstruction by CT, with obstruction series ordered and pending  Patient denies any prior abdominal surgeries hydrated pre or intra-abdominal infections  Has no history of IBD  Does report a 3 day history of nausea and vomiting which may be related to SBO, but states his wife is also having similar symptoms currently  · NG tube malfunction overnight, was pulled by surgery  · Is passing gas and having bowel movements - however, with development of black tarry stool  · Continued decrease in bowel sounds  · Continue antiemetics as needed  · Surgery consulted and following - reports need for continued observation to ensure resolution of partial small bowel obstruction  Black stool  Assessment & Plan  Development of black tarry stool, heme-positive gastric contents and stool  Patient reports multiple colonoscopies in the past, with the last being approximately 1 year ago at an outside institution  Also with traumatic NG tube placement, with reported bleeding upon removal this morning  Current symptoms may represent an upper GI bleed, potentially in the setting of traumatic NG tube insertion and removal     Continue IV PPI BID, trend hemoglobin, and transfuse as necessary  Surgery apparently discussed case with GI, who did not feel urgent intervention was required  Labs and exam in consistent with ischemic colitis  Monitor closely  ROSANA (acute kidney injury) ([facility] [address] )  Assessment & Plan  Likely prerenal, resolved with IV fluids  Continue to hold ARB  Monitor renal function      Mixed hyperlipidemia  Assessment & Plan  Continue high potency statin, Zetia  Hypothyroidism  Assessment & Plan  Continue thyroid replacement therapy  TSH normal     Primary hypertension  Assessment & Plan  BP upon admission is 166/98 - currently stable in the 140's  Currently to hold ARB  And monitor blood pressure, with IV hydralazine as needed  VTE Pharmacologic Prophylaxis: VTE Score: 3 Moderate Risk (Score 3-4) - Pharmacological DVT Prophylaxis Ordered: heparin      Patient Centered Rounds: I performed bedside rounds with nursing staff today  Discussions with Specialists or Other Care Team Provider:  General surgery     Education and Discussions with Family / Patient: Updated  (wife) via phone      Time Spent for Care: 30 minutes  More than 50% of total time spent on counseling and coordination of care as described above      Current Length of Stay: 2 day(s)  Current Patient Status: Inpatient   Certification Statement: The patient will continue to require additional inpatient hospital stay due to Continue treatment of partial small-bowel obstruction, evaluation of heme-positive stools  Discharge Plan: Anticipate discharge in 48-72 hrs to home      Code Status: Level 1 - Full Code    Subjective:   Patient seen and examined - reports resolution of abdominal pain, nausea and vomiting  Reporting continued flatus, and bowel movements described as loose and dark  Heme-positive stool and gastric contents noted today  NG tube was malfunctioning overnight and removed with reported bleeding noted  No other overnight events reported  Remains afebrile  Objective:     Vitals:   Temp (24hrs), Av 5 °F (36 9 °C), Min:98 °F (36 7 °C), Max:99 2 °F (37 3 °C)    Temp:  [98 °F (36 7 °C)-99 2 °F (37 3 °C)] 98 4 °F (36 9 °C)  HR:  [] 95  Resp:  [16-18] 16  BP: (131-154)/() 144/92  SpO2:  [91 %-96 %] 96 %  Body mass index is 31 7 kg/m²  Input and Output Summary (last 24 hours):      Intake/Output Summary (Last 24 hours) at 3/29/2022 1453  Last data filed at 3/29/2022 1315  Gross per 24 hour   Intake 1660 ml   Output 575 ml   Net 1085 ml       Physical Exam:   Vitals reviewed  Constitutional:       General: He is not in acute distress  Appearance: He is well-developed  He is obese  He is not ill-appearing or toxic-appearing  Cardiovascular:      Rate and Rhythm: Normal rate and regular rhythm  Heart sounds: No murmur heard  Pulmonary:      Effort: Pulmonary effort is normal  No respiratory distress  Breath sounds: Normal breath sounds  No wheezing or rhonchi  Abdominal:      Palpations: Abdomen is soft  Tenderness: There is no abdominal tenderness  There is no guarding or rebound  Comments: Decreased bowel sounds  No tenderness or distention noted  Musculoskeletal:         General: No swelling or tenderness  Cervical back: Neck supple  Skin:     General: Skin is warm and dry  Neurological:      General: No focal deficit present  Mental Status: He is alert and oriented to person, place, and time  Psychiatric:         Mood and Affect: Mood normal          Behavior: Behavior normal      Additional Data:     Labs:  Results from last 7 days   Lab Units 03/29/22  0636   WBC Thousand/uL 9 07   HEMOGLOBIN g/dL 17 2*   HEMATOCRIT % 51 8*   PLATELETS Thousands/uL 210   NEUTROS PCT % 67   LYMPHS PCT % 18   MONOS PCT % 12   EOS PCT % 2     Results from last 7 days   Lab Units 03/29/22  0636 03/28/22  0437 03/28/22  0437   SODIUM mmol/L 140   < > 139   POTASSIUM mmol/L 4 7   < > 3 9   CHLORIDE mmol/L 104   < > 104   CO2 mmol/L 26   < > 25   BUN mg/dL 15   < > 14   CREATININE mg/dL 1 08   < > 1 13   ANION GAP mmol/L 10   < > 10   CALCIUM mg/dL 8 6   < > 8 2*   ALBUMIN g/dL  --   --  3 0*   TOTAL BILIRUBIN mg/dL  --   --  0 72   ALK PHOS U/L  --   --  56   ALT U/L  --   --  23   AST U/L  --   --  17   GLUCOSE RANDOM mg/dL 92   < > 97    < > = values in this interval not displayed                   Results from last 7 days Lab Units 03/27/22  1748 03/27/22  1526   LACTIC ACID mmol/L 1 0 2 8*       Lines/Drains:  Invasive Devices  Report    Peripheral Intravenous Line            Peripheral IV 03/29/22 Left Wrist <1 day                Imaging:  Reviewed results of obstruction series performed 03/28/2022  Recent Cultures (last 7 days):         Last 24 Hours Medication List:   Current Facility-Administered Medications   Medication Dose Route Frequency Provider Last Rate    atorvastatin  20 mg Oral Daily Holley Carpenter PA-C      ezetimibe  10 mg Oral Daily Holley Carpenter PA-C      hydrALAZINE  5 mg Intravenous Q6H [facility] Holley Carpenter PA-C      HYDROmorphone  0 5 mg Intravenous Q4H PRN Holley Carpenter PA-C      levothyroxine  100 mcg Oral Early Morning Holley Carpenter PA-C      ondansetron  4 mg Intravenous Q6H PRN Holley Carpenter PA-C      pantoprazole  40 mg Intravenous Q12H [facility] Davi Guardado PA-C      phenol  1 spray Mouth/Throat Q2H PRN Bernabe Vick MD          Today, Patient Was Seen By: Bernabe Vick MD    **Please Note: This note may have been constructed using a voice recognition system  **

## 2022-03-29 NOTE — QUICK NOTE
Alerted by nursing staff that the patient's NG tube has not been suctioning  Be both nursing and I have attempted to flush the tube but have been a m  Unable to pass water through it  Connections were checked and seemed to be sealed and patent  Suspect that there is an obstructing blood clot in the NG tube  Patient does have some bloody drainage in the NG tube which is new tonight  The patient has had a bowel movement today and reports some flatus  He denies any current nausea  He states he has been nauseous most of the day  Patient states that he would not like the NG tube to be replaced  I discussed this case with Dr Queen You general surgery call who stated that the NG tube could be taken out tonight since the patient has had a bowel movement and no current nausea  She recommended close following of the patient to ensure that he does not get nauseous or  vomit blood  Recommended IV PPI and holding heparin  IV pantoprazole 40 mg b i d    Hold heparin  Remove NG tube  Continue IV fluids  Continue NPO  Will follow patient closely

## 2022-03-29 NOTE — ASSESSMENT & PLAN NOTE
Partial small-bowel obstruction by CT, with obstruction series ordered and pending  Patient denies any prior abdominal surgeries hydrated pre or intra-abdominal infections  Has no history of IBD  Does report a 3 day history of nausea and vomiting which may be related to SBO, but states his wife is also having similar symptoms currently  · NG tube malfunction overnight, was pulled by surgery  · Is passing gas and having bowel movements - however, with development of black tarry stool  · Continued decrease in bowel sounds  · Continue antiemetics as needed  · Surgery consulted and following - reports need for continued observation to ensure resolution of partial small bowel obstruction

## 2022-03-30 VITALS
RESPIRATION RATE: 16 BRPM | HEART RATE: 88 BPM | BODY MASS INDEX: 31.94 KG/M2 | OXYGEN SATURATION: 97 % | DIASTOLIC BLOOD PRESSURE: 94 MMHG | WEIGHT: 223.11 LBS | SYSTOLIC BLOOD PRESSURE: 138 MMHG | TEMPERATURE: 98 F | HEIGHT: 70 IN

## 2022-03-30 LAB
ANION GAP SERPL CALCULATED.3IONS-SCNC: 6 MMOL/L (ref 4–13)
BASOPHILS # BLD AUTO: 0.04 THOUSANDS/ΜL (ref 0–0.1)
BASOPHILS NFR BLD AUTO: 1 % (ref 0–1)
BUN SERPL-MCNC: 8 MG/DL (ref 5–25)
CALCIUM SERPL-MCNC: 8.3 MG/DL (ref 8.3–10.1)
CHLORIDE SERPL-SCNC: 106 MMOL/L (ref 100–108)
CO2 SERPL-SCNC: 26 MMOL/L (ref 21–32)
CREAT SERPL-MCNC: 1.04 MG/DL (ref 0.6–1.3)
EOSINOPHIL # BLD AUTO: 0.17 THOUSAND/ΜL (ref 0–0.61)
EOSINOPHIL NFR BLD AUTO: 3 % (ref 0–6)
ERYTHROCYTE [DISTWIDTH] IN BLOOD BY AUTOMATED COUNT: 13.1 % (ref 11.6–15.1)
GFR SERPL CREATININE-BSD FRML MDRD: 76 ML/MIN/1.73SQ M
GLUCOSE SERPL-MCNC: 94 MG/DL (ref 65–140)
HCT VFR BLD AUTO: 47.4 % (ref 36.5–49.3)
HCT VFR BLD AUTO: 51.2 % (ref 36.5–49.3)
HGB BLD-MCNC: 15.6 G/DL (ref 12–17)
HGB BLD-MCNC: 16.6 G/DL (ref 12–17)
IMM GRANULOCYTES # BLD AUTO: 0.02 THOUSAND/UL (ref 0–0.2)
IMM GRANULOCYTES NFR BLD AUTO: 0 % (ref 0–2)
LYMPHOCYTES # BLD AUTO: 1.43 THOUSANDS/ΜL (ref 0.6–4.47)
LYMPHOCYTES NFR BLD AUTO: 22 % (ref 14–44)
MAGNESIUM SERPL-MCNC: 2.1 MG/DL (ref 1.6–2.6)
MCH RBC QN AUTO: 30.3 PG (ref 26.8–34.3)
MCHC RBC AUTO-ENTMCNC: 32.9 G/DL (ref 31.4–37.4)
MCV RBC AUTO: 92 FL (ref 82–98)
MONOCYTES # BLD AUTO: 0.87 THOUSAND/ΜL (ref 0.17–1.22)
MONOCYTES NFR BLD AUTO: 13 % (ref 4–12)
NEUTROPHILS # BLD AUTO: 3.95 THOUSANDS/ΜL (ref 1.85–7.62)
NEUTS SEG NFR BLD AUTO: 61 % (ref 43–75)
NRBC BLD AUTO-RTO: 0 /100 WBCS
PLATELET # BLD AUTO: 206 THOUSANDS/UL (ref 149–390)
PMV BLD AUTO: 10.4 FL (ref 8.9–12.7)
POTASSIUM SERPL-SCNC: 4.2 MMOL/L (ref 3.5–5.3)
RBC # BLD AUTO: 5.15 MILLION/UL (ref 3.88–5.62)
SODIUM SERPL-SCNC: 138 MMOL/L (ref 136–145)
WBC # BLD AUTO: 6.48 THOUSAND/UL (ref 4.31–10.16)

## 2022-03-30 PROCEDURE — 83735 ASSAY OF MAGNESIUM: CPT | Performed by: INTERNAL MEDICINE

## 2022-03-30 PROCEDURE — 99239 HOSP IP/OBS DSCHRG MGMT >30: CPT | Performed by: INTERNAL MEDICINE

## 2022-03-30 PROCEDURE — 85014 HEMATOCRIT: CPT | Performed by: INTERNAL MEDICINE

## 2022-03-30 PROCEDURE — 85025 COMPLETE CBC W/AUTO DIFF WBC: CPT | Performed by: INTERNAL MEDICINE

## 2022-03-30 PROCEDURE — 85018 HEMOGLOBIN: CPT | Performed by: INTERNAL MEDICINE

## 2022-03-30 PROCEDURE — NC001 PR NO CHARGE

## 2022-03-30 PROCEDURE — 99232 SBSQ HOSP IP/OBS MODERATE 35: CPT

## 2022-03-30 PROCEDURE — C9113 INJ PANTOPRAZOLE SODIUM, VIA: HCPCS

## 2022-03-30 PROCEDURE — 80048 BASIC METABOLIC PNL TOTAL CA: CPT | Performed by: INTERNAL MEDICINE

## 2022-03-30 RX ORDER — PANTOPRAZOLE SODIUM 40 MG/1
40 TABLET, DELAYED RELEASE ORAL 2 TIMES DAILY
Qty: 60 TABLET | Refills: 0 | Status: SHIPPED | OUTPATIENT
Start: 2022-03-30

## 2022-03-30 RX ADMIN — PANTOPRAZOLE SODIUM 40 MG: 40 INJECTION, POWDER, FOR SOLUTION INTRAVENOUS at 08:54

## 2022-03-30 RX ADMIN — LEVOTHYROXINE SODIUM 100 MCG: 100 TABLET ORAL at 05:56

## 2022-03-30 RX ADMIN — ATORVASTATIN CALCIUM 20 MG: 10 TABLET, FILM COATED ORAL at 08:05

## 2022-03-30 RX ADMIN — EZETIMIBE 10 MG: 10 TABLET ORAL at 08:05

## 2022-03-30 RX ADMIN — HYDRALAZINE HYDROCHLORIDE 5 MG: 20 INJECTION, SOLUTION INTRAMUSCULAR; INTRAVENOUS at 00:08

## 2022-03-30 RX ADMIN — HYDRALAZINE HYDROCHLORIDE 5 MG: 20 INJECTION, SOLUTION INTRAMUSCULAR; INTRAVENOUS at 05:56

## 2022-03-30 NOTE — DISCHARGE SUMMARY
[address] [facility] [address]  Discharge- Cherre Dandy Demetriades 1960, 57 y o  male MRN: [id number]  Unit/Bed#: 406-01 Encounter: [id number]  Primary Care Provider: Chanelle Zimmerman DO   Date and time admitted to hospital: 3/27/2022  2:51 PM    * Partial small bowel obstruction [facility])  Assessment & Plan  Partial small-bowel obstruction by CT 3/27  Patient denies any prior abdominal surgeries or prior intra-abdominal infections  Has no history of IBD  Does report a 3 day history of nausea and vomiting which may be related to SBO, but states his wife is also having similar symptoms currently  · Follow-up obstruction series 3/28 mildly improved  · Initially maintained on NG tube, was pulled by surgery due to malfunction  · Continues to report flatus and having bowel movements - however, with continued black stool  · Continued decrease in bowel sounds, although improved  · Tolerating diet well, and reporting near resolution of abdominal pain and no further vomiting  · Would ideally benefit from additional observation to ensure conclusion of bleeding and full resolution of partial SBO, but patient is insistent on discharge today  Of note, patient also reports change in bowel habits over the course of the last month  Last colonoscopy reported as approximately 2 years ago by gastroenterologist Dr Ortega Negro  Given current evidence of GI bleed, bleeding with NG tube removal, history of Hawkins's esophagus, partial SBO, and altered bowel habits recommend short course follow-up with Gastroenterology following discharge  Also advised patient to return to the hospital if he experienced any worsening symptoms, which were discussed in detail  Case was discussed in detail with Dr Hanna Ashley - patient has follow-up scheduled 5 days following discharge  Black stool  Assessment & Plan  Development of black tarry stool, heme-positive gastric contents and stool      Patient reports multiple colonoscopies in the past, with the last being approximately 2 years ago (12/2020)  Also with traumatic NG tube placement, with reported bleeding upon removal   Stool and gastric contents both positive for occult blood  Current symptoms may represent an upper GI bleed, potentially in the setting of traumatic NG tube insertion and removal     Was maintained on IV PPI BID, with plans for continuation of oral PPI twice daily upon discharge  Trended hemoglobin which remained stable prior to discharge  Will repeat CBC in 3 days as outpatient to ensure stability  Surgery apparently discussed case with GI, who did not feel urgent intervention was required  Labs and exam inconsistent with ischemic colitis  Monitor closely  ROSANA (acute kidney injury) ([facility] [address] )  Assessment & Plan  Likely prerenal, resolved with IV fluids  Resume ARB at time of discharge  Monitor renal function as outpatient  Mixed hyperlipidemia  Assessment & Plan  Continue high potency statin, Zetia  Hypothyroidism  Assessment & Plan  Continue thyroid replacement therapy  TSH normal     Primary hypertension  Assessment & Plan  BP upon admission is 166/98 - currently stable in the 140's  Will resume ARB at time of discharge and monitor renal function as above        Medical Problems             Resolved Problems  Date Reviewed: 3/30/2022    None              Discharging Physician / Practitioner: Thomas Berry MD  PCP: Aroldo Mosqueda DO  Admission Date:   Admission Orders (From admission, onward)     Ordered        03/27/22 1903  Inpatient Admission  Once            03/27/22 1907  Inpatient Admission  Once                      Discharge Date: 03/30/22    Consultations During Hospital Stay:  · General surgery    Procedures Performed:   · None    Significant Findings / Test Results:   XR abdomen obstruction series    Result Date: 3/28/2022  Impression: Dilated loops of mid small intestine with normal caliber distal ileum and colon, consistent with partial distal small bowel obstruction  Apparent decrease in the degree of distended small bowel since a CT from 3/27/2022  7 mm calculus in the lower pole of the right kidney  Workstation performed: [id number]     CT abdomen pelvis with contrast    Result Date: 3/27/2022  Impression: 1  Multiple loops of mild-moderate small bowel distention involving the jejunum and proximal ileum concerning for partial small bowel obstruction as described above with slight transition point in the left mid abdomen  2   Diverticulosis without diverticulitis  3   Nonobstructing 11 mm right lower pole renal calculus  4   Additional findings as noted  The study was marked in [facility] for immediate notification  Workstation performed: [id number]     Incidental Findings:   · As above     Test Results Pending at Discharge (will require follow up): · None     Outpatient Tests Requested:  · BMP, CBC    Complications:  None    Reason for Admission: Partial SBO    HPI from original H&P dated 03/27/2022:     Eulalio Jimenez is a 57 y o  male with a PMH of hypertension, GERD, hyperlipidemia, hypothyroidism who presents with abdominal pain x3 days  The patient states that 3 days ago, he began to feel nauseous as well as have a stabbing abdominal pain  He states that the pain is mostly in his right upper quadrant and left upper quadrant  He rates the pain a 5/10, it does not radiate anywhere and it is generally constant  He states that what is most distressing is the nausea, vomiting that he has been experiencing over the past several days  He has had no p o  Intake at home over the past several days as well  He denies a history of any previous surgeries, recent illnesses, sick contacts  He states that when he tries to eat he generally just vomits  He states that he has tried to switch his diet to just liquids but this did not help him  He has also tried at home pain medications including ibuprofen and Tylenol with no improvement    He also reports associated chills, diarrhea  He states that he has had several bowel movements the past 3 days  CT scan performed in the ED showed multiple loops of mild to moderate small bowel distention involving the jejunum and proximal ileum concerning for partial small-bowel obstruction  ED provider discussed case with General surgery on-call who recommended that the patient be admitted here NPO for bowel rest as well as receive IV fluids and have an NG tube placed  NG tube was placed in the ED  Patient denies any current chest pain, shortness of breath, headaches, dizziness, lightheadedness  Please see above list of diagnoses and related plan for additional information  Condition at Discharge: stable    Discharge Day Visit / Exam:   Subjective:  Patient reports feeling vastly improved, with continued flatus  Also reports continued black loose stools  Hemoglobin remains stable in the morning and with repeat check prior to discharge  Abdominal pain nearly resolved, with no further nausea or vomiting  Patient is tolerating oral intake well  No overnight events reported  Remains afebrile  Vitals: Blood Pressure: 138/94 (03/30/22 0728)  Pulse: 88 (03/30/22 0728)  Temperature: 98 °F (36 7 °C) (03/30/22 0728)  Temp Source: Oral (03/30/22 0728)  Respirations: 16 (03/30/22 0728)  Height: 5' 10" (177 8 cm) (03/27/22 2051)  Weight - Scale: 101 kg (223 lb 1 7 oz) (03/30/22 0542)  SpO2: 97 % (03/30/22 0728)  Exam:   Vitals reviewed  Constitutional:       General: He is not in acute distress      Appearance: He is well-developed  He is obese  He is not ill-appearing or toxic-appearing  Cardiovascular:      Rate and Rhythm: Normal rate and regular rhythm       Heart sounds: No murmur heard  Pulmonary:      Effort: Pulmonary effort is normal  No respiratory distress       Breath sounds: Normal breath sounds  No wheezing or rhonchi  Abdominal:      Palpations: Abdomen is soft       Tenderness:  There is no abdominal tenderness  There is no guarding or rebound       Comments: Decreased bowel sounds, improved  No tenderness or distention noted      Musculoskeletal:         General: No swelling or tenderness       Cervical back: Neck supple  Skin:     General: Skin is warm and dry  Neurological:      General: No focal deficit present       Mental Status: He is alert and oriented to person, place, and time  Psychiatric: Stanton Jean and Affect: Mood normal          Behavior: Behavior normal      Discussion with Family: Updated  (wife) via phone  Discharge instructions/Information to patient and family:   See after visit summary for information provided to patient and family  Provisions for Follow-Up Care:  See after visit summary for information related to follow-up care and any pertinent home health orders  Disposition:   Home    Planned Readmission: No     Discharge Statement:  I spent 45 minutes discharging the patient  This time was spent on the day of discharge  I had direct contact with the patient on the day of discharge  Greater than 50% of the total time was spent examining patient, answering all patient questions, arranging and discussing plan of care with patient as well as directly providing post-discharge instructions  Additional time then spent on discharge activities  Discharge Medications:  See after visit summary for reconciled discharge medications provided to patient and/or family        **Please Note: This note may have been constructed using a voice recognition system**

## 2022-03-30 NOTE — PLAN OF CARE
Problem: PAIN - ADULT  Goal: Verbalizes/displays adequate comfort level or baseline comfort level  Description: Interventions:  - Encourage patient to monitor pain and request assistance  - Assess pain using appropriate pain scale  - Administer analgesics based on type and severity of pain and evaluate response  - Implement non-pharmacological measures as appropriate and evaluate response  - Consider cultural and social influences on pain and pain management  - Notify physician/advanced practitioner if interventions unsuccessful or patient reports new pain  Outcome: Progressing     Problem: INFECTION - ADULT  Goal: Absence or prevention of progression during hospitalization  Description: INTERVENTIONS:  - Assess and monitor for signs and symptoms of infection  - Monitor lab/diagnostic results  - Monitor all insertion sites, i e  indwelling lines, tubes, and drains  - [place] appropriate cooling/warming therapies per order  - Administer medications as ordered  - Instruct and encourage patient and family to use good hand hygiene technique  - Identify and instruct in appropriate isolation precautions for identified infection/condition  Outcome: Progressing     Problem: SAFETY ADULT  Goal: Patient will remain free of falls  Description: INTERVENTIONS:  - Educate patient/family on patient safety including physical limitations  - Instruct patient to call for assistance with activity   - Consult OT/PT to assist with strengthening/mobility   - Keep Call bell within reach  - Keep bed low and locked with side rails adjusted as appropriate  - Keep care items and personal belongings within reach  - Initiate and maintain comfort rounds  - Make Fall Risk Sign visible to staff  - Offer Toileting every two Hours, in advance of need  - Obtain necessary fall risk management equipment: non slip socks   - Apply yellow socks and bracelet for high fall risk patients  - Consider moving patient to room near nurses station  Outcome: Progressing  Goal: Maintain or return to baseline ADL function  Description: INTERVENTIONS:  -  Assess patient's ability to carry out ADLs; assess patient's baseline for ADL function and identify physical deficits which impact ability to perform ADLs (bathing, care of mouth/teeth, toileting, grooming, dressing, etc )  - Assess/evaluate cause of self-care deficits   - Assess range of motion  - Assess patient's mobility; develop plan if impaired  - Assess patient's need for assistive devices and provide as appropriate  - Encourage maximum independence but intervene and supervise when necessary  - Involve family in performance of ADLs  - Assess for home care needs following discharge   - Consider OT consult to assist with ADL evaluation and planning for discharge  - Provide patient education as appropriate  Outcome: Progressing  Goal: Maintains/Returns to pre admission functional level  Description: INTERVENTIONS:  - Perform BMAT or MOVE assessment daily    - Set and communicate daily mobility goal to care team and patient/family/caregiver  - Collaborate with rehabilitation services on mobility goals if consulted  - Perform Range of Motion three times a day    - Dangle patient three times a day  - Stand patient three times a day  - Ambulate patient three times a day  - Out of bed to chair three times a day   - Out of bed for meals three times a day  - Out of bed for toileting  - Record patient progress and toleration of activity level   Outcome: Progressing     Problem: DISCHARGE PLANNING  Goal: Discharge to home or other facility with appropriate resources  Description: INTERVENTIONS:  - Identify barriers to discharge w/patient and caregiver  - Arrange for needed discharge resources and transportation as appropriate  - Identify discharge learning needs (meds, wound care, etc )  - Arrange for interpretive services to assist at discharge as needed  - Refer to Case Management Department for coordinating discharge planning if the patient needs post-hospital services based on physician/advanced practitioner order or complex needs related to functional status, cognitive ability, or social support system  Outcome: Progressing     Problem: Knowledge Deficit  Goal: Patient/family/caregiver demonstrates understanding of disease process, treatment plan, medications, and discharge instructions  Description: Complete learning assessment and assess knowledge base    Interventions:  - Provide teaching at level of understanding  - Provide teaching via preferred learning methods  Outcome: Progressing     Problem: GASTROINTESTINAL - ADULT  Goal: Minimal or absence of nausea and/or vomiting  Description: INTERVENTIONS:  - Administer IV fluids if ordered to ensure adequate hydration  - Maintain NPO status until nausea and vomiting are resolved  - Nasogastric tube if ordered  - Administer ordered antiemetic medications as needed  - Provide nonpharmacologic comfort measures as appropriate  - Advance diet as tolerated, if ordered  - Consider nutrition services referral to assist patient with adequate nutrition and appropriate food choices  Outcome: Progressing  Goal: Maintains or returns to baseline bowel function  Description: INTERVENTIONS:  - Assess bowel function  - Encourage oral fluids to ensure adequate hydration  - Administer IV fluids if ordered to ensure adequate hydration  - Administer ordered medications as needed  - Encourage mobilization and activity  - Consider nutritional services referral to assist patient with adequate nutrition and appropriate food choices  Outcome: Progressing  Goal: Maintains adequate nutritional intake  Description: INTERVENTIONS:  - Monitor percentage of each meal consumed  - Identify factors contributing to decreased intake, treat as appropriate  - Assist with meals as needed  - Monitor I&O, weight, and lab values if indicated  - Obtain nutrition services referral as needed  Outcome: Progressing Problem: HEMATOLOGIC - ADULT  Goal: Maintains hematologic stability  Description: INTERVENTIONS  - Assess for signs and symptoms of bleeding or hemorrhage  - Monitor labs  - Administer supportive blood products/factors as ordered and appropriate  Outcome: Progressing     Problem: Nutrition/Hydration-ADULT  Goal: Nutrient/Hydration intake appropriate for improving, restoring or maintaining nutritional needs  Description: Monitor and assess patient's nutrition/hydration status for malnutrition  Collaborate with interdisciplinary team and initiate plan and interventions as ordered  Monitor patient's weight and dietary intake as ordered or per policy  Utilize nutrition screening tool and intervene as necessary  Determine patient's food preferences and provide high-protein, high-caloric foods as appropriate       INTERVENTIONS:  - Monitor oral intake, urinary output, labs, and treatment plans  - Assess nutrition and hydration status and recommend course of action  - Evaluate amount of meals eaten  - Assist patient with eating if necessary   - Allow adequate time for meals  - Recommend/ encourage appropriate diets, oral nutritional supplements, and vitamin/mineral supplements  - Order, calculate, and assess calorie counts as needed  - Recommend, monitor, and adjust tube feedings and TPN/PPN based on assessed needs  - Assess need for intravenous fluids  - Provide specific nutrition/hydration education as appropriate  - Include patient/family/caregiver in decisions related to nutrition  Outcome: Progressing

## 2022-03-30 NOTE — DISCHARGE INSTRUCTIONS
Follow-up with Dr Maggie Hicks, gastroenterologist in [address] [place] [phone] who is cared for you in the past, to be seen the next month  Cause office to schedule appointment date and time  Patient instructed to avoid any aspirin or ibuprofen products  Resume regular diet at home as before  There is a nonobstructing kidney stone in the right lower kidney noted on CT scan  Patient was enquiring about if he needs urological follow-up  He formally was seen by Dr Schuyler Sandoval, urologist in [address] [place] [address] who is no longer in practice apparently  He can follow-up with Dr Maria Elena Grove, urologist here who has an office in [facility] call if he so chooses  Right now the kidney stone is not obstructing  Likely no intervention at this time but if he would like to get an opinion if anything needs to be done, then he can make an appointment to be seen by Urology as an outpatient  Kimmy Landis PA-C        Bowel Obstruction   WHAT YOU NEED TO KNOW:   A bowel obstruction is a partial or complete blockage of your intestine  Your small or large intestine may be affected  The blockage prevents food and waste from passing through normally  DISCHARGE INSTRUCTIONS:   Seek care immediately if:   · You have severe abdominal pain that does not get better  · Your heart is beating faster than normal for you  · You have a fever  Call your doctor if:   · You have nausea and are vomiting  · Your abdomen is enlarged  · You cannot pass a bowel movement or gas  · You lose weight without trying  · You have blood in your bowel movement  · You have questions or concerns about your condition or care  Follow up with your doctor as directed:  Write down your questions so you remember to ask them during your visits  © Copyright [employer] 2022 Information is for End User's use only and may not be sold, redistributed or otherwise used for commercial purposes   All illustrations and images included in CareNotes® are the copyrighted property of A D A M , Inc  or [facility] Lori Parsons   The above information is an  only  It is not intended as medical advice for individual conditions or treatments  Talk to your doctor, nurse or pharmacist before following any medical regimen to see if it is safe and effective for you

## 2022-03-30 NOTE — ASSESSMENT & PLAN NOTE
Partial small-bowel obstruction by CT 3/27  Patient denies any prior abdominal surgeries or prior intra-abdominal infections  Has no history of IBD  Does report a 3 day history of nausea and vomiting which may be related to SBO, but states his wife is also having similar symptoms currently  · Follow-up obstruction series 3/28 mildly improved  · Initially maintained on NG tube, was pulled by surgery due to malfunction  · Continues to report flatus and having bowel movements - however, with continued black stool  · Continued decrease in bowel sounds, although improved  · Tolerating diet well, and reporting near resolution of abdominal pain and no further vomiting  · Would ideally benefit from additional observation to ensure conclusion of bleeding and full resolution of partial SBO, but patient is insistent on discharge today  Of note, patient also reports change in bowel habits over the course of the last month  Last colonoscopy reported as approximately 2 years ago by gastroenterologist Dr Edna Rodney  Given current evidence of GI bleed, bleeding with NG tube removal, history of Hawkins's esophagus, partial SBO, and altered bowel habits recommend short course follow-up with Gastroenterology following discharge  Also advised patient to return to the hospital if he experienced any worsening symptoms, which were discussed in detail  Case was discussed in detail with Dr Rafael Sanders - patient has follow-up scheduled 5 days following discharge

## 2022-03-30 NOTE — CASE MANAGEMENT
Case Management Discharge Planning Note    Patient name Eulalio Jimenez  Location [facility] 807/[id number] MRN [id number]  : 1960 Date 3/30/2022       Current Admission Date: 3/27/2022  Current Admission Diagnosis:Partial small bowel obstruction [facility])   Patient Active Problem List    Diagnosis Date Noted    Black stool 2022    ROSANA (acute kidney injury) ([facility] [address] ) 2022    Mixed hyperlipidemia 2022    Partial small bowel obstruction ([facility] [address] ) 2022    Primary hypertension 2022    Hypothyroidism 2022    Encounter for [employer] ([address]) examination 2019      LOS (days): 3  Geometric Mean LOS (GMLOS) (days): 3 20  Days to GMLOS:0 5     OBJECTIVE:  Risk of Unplanned Readmission Score: 6         Current admission status: Inpatient   Preferred Pharmacy:   [facility] [id number] [address] [address]  Phone: [phone] Fax: [phone]    Primary Care Provider: Lebron Vanegas DO    Primary Insurance: BLUE CROSS  Secondary Insurance:     DISCHARGE DETAILS:Pt is being dc'd home on this date with OP follow up after dc

## 2022-03-30 NOTE — INCIDENTAL FINDINGS
The following findings require follow up:  Radiographic finding   Finding:  Abnormal CT of the abdomen and pelvis   Follow up required: Follow-up with Gastroenterology, potential reimaging if deemed warranted  Follow up should be done within 1 week(s)    Please notify the following clinician to assist with the follow up:   Dr Bethel Hull

## 2022-03-30 NOTE — ASSESSMENT & PLAN NOTE
Likely prerenal, resolved with IV fluids  Resume ARB at time of discharge  Monitor renal function as outpatient

## 2022-03-30 NOTE — ASSESSMENT & PLAN NOTE
BP upon admission is 166/98 - currently stable in the 140's  Will resume ARB at time of discharge and monitor renal function as above

## 2022-03-30 NOTE — PLAN OF CARE
Problem: PAIN - ADULT  Goal: Verbalizes/displays adequate comfort level or baseline comfort level  Description: Interventions:  - Encourage patient to monitor pain and request assistance  - Assess pain using appropriate pain scale  - Administer analgesics based on type and severity of pain and evaluate response  - Implement non-pharmacological measures as appropriate and evaluate response  - Consider cultural and social influences on pain and pain management  - Notify physician/advanced practitioner if interventions unsuccessful or patient reports new pain  Outcome: Progressing     Problem: INFECTION - ADULT  Goal: Absence or prevention of progression during hospitalization  Description: INTERVENTIONS:  - Assess and monitor for signs and symptoms of infection  - Monitor lab/diagnostic results  - Monitor all insertion sites, i e  indwelling lines, tubes, and drains  - [place] appropriate cooling/warming therapies per order  - Administer medications as ordered  - Instruct and encourage patient and family to use good hand hygiene technique  - Identify and instruct in appropriate isolation precautions for identified infection/condition  Outcome: Progressing     Problem: SAFETY ADULT  Goal: Patient will remain free of falls  Description: INTERVENTIONS:  - Educate patient/family on patient safety including physical limitations  - Instruct patient to call for assistance with activity   - Consult OT/PT to assist with strengthening/mobility   - Keep Call bell within reach  - Keep bed low and locked with side rails adjusted as appropriate  - Keep care items and personal belongings within reach  - Initiate and maintain comfort rounds  - Make Fall Risk Sign visible to staff  - Offer Toileting every two Hours, in advance of need  - Obtain necessary fall risk management equipment: non slip socks   - Apply yellow socks and bracelet for high fall risk patients  - Consider moving patient to room near nurses station  Outcome: Progressing  Goal: Maintain or return to baseline ADL function  Description: INTERVENTIONS:  -  Assess patient's ability to carry out ADLs; assess patient's baseline for ADL function and identify physical deficits which impact ability to perform ADLs (bathing, care of mouth/teeth, toileting, grooming, dressing, etc )  - Assess/evaluate cause of self-care deficits   - Assess range of motion  - Assess patient's mobility; develop plan if impaired  - Assess patient's need for assistive devices and provide as appropriate  - Encourage maximum independence but intervene and supervise when necessary  - Involve family in performance of ADLs  - Assess for home care needs following discharge   - Consider OT consult to assist with ADL evaluation and planning for discharge  - Provide patient education as appropriate  Outcome: Progressing  Goal: Maintains/Returns to pre admission functional level  Description: INTERVENTIONS:  - Perform BMAT or MOVE assessment daily    - Set and communicate daily mobility goal to care team and patient/family/caregiver  - Collaborate with rehabilitation services on mobility goals if consulted  - Perform Range of Motion three times a day    - Dangle patient three times a day  - Stand patient three times a day  - Ambulate patient three times a day  - Out of bed to chair three times a day   - Out of bed for meals three times a day  - Out of bed for toileting  - Record patient progress and toleration of activity level   Outcome: Progressing     Problem: DISCHARGE PLANNING  Goal: Discharge to home or other facility with appropriate resources  Description: INTERVENTIONS:  - Identify barriers to discharge w/patient and caregiver  - Arrange for needed discharge resources and transportation as appropriate  - Identify discharge learning needs (meds, wound care, etc )  - Arrange for interpretive services to assist at discharge as needed  - Refer to Case Management Department for coordinating discharge planning if the patient needs post-hospital services based on physician/advanced practitioner order or complex needs related to functional status, cognitive ability, or social support system  Outcome: Progressing     Problem: Knowledge Deficit  Goal: Patient/family/caregiver demonstrates understanding of disease process, treatment plan, medications, and discharge instructions  Description: Complete learning assessment and assess knowledge base    Interventions:  - Provide teaching at level of understanding  - Provide teaching via preferred learning methods  Outcome: Progressing     Problem: GASTROINTESTINAL - ADULT  Goal: Minimal or absence of nausea and/or vomiting  Description: INTERVENTIONS:  - Administer IV fluids if ordered to ensure adequate hydration  - Maintain NPO status until nausea and vomiting are resolved  - Nasogastric tube if ordered  - Administer ordered antiemetic medications as needed  - Provide nonpharmacologic comfort measures as appropriate  - Advance diet as tolerated, if ordered  - Consider nutrition services referral to assist patient with adequate nutrition and appropriate food choices  Outcome: Progressing  Goal: Maintains or returns to baseline bowel function  Description: INTERVENTIONS:  - Assess bowel function  - Encourage oral fluids to ensure adequate hydration  - Administer IV fluids if ordered to ensure adequate hydration  - Administer ordered medications as needed  - Encourage mobilization and activity  - Consider nutritional services referral to assist patient with adequate nutrition and appropriate food choices  Outcome: Progressing  Goal: Maintains adequate nutritional intake  Description: INTERVENTIONS:  - Monitor percentage of each meal consumed  - Identify factors contributing to decreased intake, treat as appropriate  - Assist with meals as needed  - Monitor I&O, weight, and lab values if indicated  - Obtain nutrition services referral as needed  Outcome: Progressing Problem: Nutrition/Hydration-ADULT  Goal: Nutrient/Hydration intake appropriate for improving, restoring or maintaining nutritional needs  Description: Monitor and assess patient's nutrition/hydration status for malnutrition  Collaborate with interdisciplinary team and initiate plan and interventions as ordered  Monitor patient's weight and dietary intake as ordered or per policy  Utilize nutrition screening tool and intervene as necessary  Determine patient's food preferences and provide high-protein, high-caloric foods as appropriate       INTERVENTIONS:  - Monitor oral intake, urinary output, labs, and treatment plans  - Assess nutrition and hydration status and recommend course of action  - Evaluate amount of meals eaten  - Assist patient with eating if necessary   - Allow adequate time for meals  - Recommend/ encourage appropriate diets, oral nutritional supplements, and vitamin/mineral supplements  - Order, calculate, and assess calorie counts as needed  - Recommend, monitor, and adjust tube feedings and TPN/PPN based on assessed needs  - Assess need for intravenous fluids  - Provide specific nutrition/hydration education as appropriate  - Include patient/family/caregiver in decisions related to nutrition  Outcome: Progressing     Problem: HEMATOLOGIC - ADULT  Goal: Maintains hematologic stability  Description: INTERVENTIONS  - Assess for signs and symptoms of bleeding or hemorrhage  - Monitor labs  - Administer supportive blood products/factors as ordered and appropriate  Outcome: Progressing     Problem: Potential for Falls  Goal: Patient will remain free of falls  Description: INTERVENTIONS:  - Educate patient/family on patient safety including physical limitations  - Instruct patient to call for assistance with activity   - Consult OT/PT to assist with strengthening/mobility   - Keep Call bell within reach  - Keep bed low and locked with side rails adjusted as appropriate  - Keep care items and personal belongings within reach  - Initiate and maintain comfort rounds  - Make Fall Risk Sign visible to staff  - Offer Toileting every two Hours, in advance of need  - Obtain necessary fall risk management equipment: non slip socks   - Apply yellow socks and bracelet for high fall risk patients  - Consider moving patient to room near nurses station  Outcome: Progressing

## 2022-03-30 NOTE — ASSESSMENT & PLAN NOTE
Development of black tarry stool, heme-positive gastric contents and stool  Patient reports multiple colonoscopies in the past, with the last being approximately 2 years ago (12/2020)  Also with traumatic NG tube placement, with reported bleeding upon removal   Stool and gastric contents both positive for occult blood  Current symptoms may represent an upper GI bleed, potentially in the setting of traumatic NG tube insertion and removal     Was maintained on IV PPI BID, with plans for continuation of oral PPI twice daily upon discharge  Trended hemoglobin which remained stable prior to discharge  Will repeat CBC in 3 days as outpatient to ensure stability  Surgery apparently discussed case with GI, who did not feel urgent intervention was required  Labs and exam inconsistent with ischemic colitis  Monitor closely

## 2022-03-30 NOTE — PLAN OF CARE
Problem: PAIN - ADULT  Goal: Verbalizes/displays adequate comfort level or baseline comfort level  Description: Interventions:  - Encourage patient to monitor pain and request assistance  - Assess pain using appropriate pain scale  - Administer analgesics based on type and severity of pain and evaluate response  - Implement non-pharmacological measures as appropriate and evaluate response  - Consider cultural and social influences on pain and pain management  - Notify physician/advanced practitioner if interventions unsuccessful or patient reports new pain  3/30/2022 1135 by Alyssa Florez LPN  Outcome: Adequate for Discharge  3/30/2022 0749 by Alyssa Florez LPN  Outcome: Progressing     Problem: INFECTION - ADULT  Goal: Absence or prevention of progression during hospitalization  Description: INTERVENTIONS:  - Assess and monitor for signs and symptoms of infection  - Monitor lab/diagnostic results  - Monitor all insertion sites, i e  indwelling lines, tubes, and drains  - [place] appropriate cooling/warming therapies per order  - Administer medications as ordered  - Instruct and encourage patient and family to use good hand hygiene technique  - Identify and instruct in appropriate isolation precautions for identified infection/condition  3/30/2022 1135 by Alyssa Florez LPN  Outcome: Adequate for Discharge  3/30/2022 0749 by Alyssa Florez LPN  Outcome: Progressing     Problem: SAFETY ADULT  Goal: Patient will remain free of falls  Description: INTERVENTIONS:  - Educate patient/family on patient safety including physical limitations  - Instruct patient to call for assistance with activity   - Consult OT/PT to assist with strengthening/mobility   - Keep Call bell within reach  - Keep bed low and locked with side rails adjusted as appropriate  - Keep care items and personal belongings within reach  - Initiate and maintain comfort rounds  - Make Fall Risk Sign visible to staff  - Offer Toileting every two Hours, in advance of need  - Obtain necessary fall risk management equipment: non slip socks   - Apply yellow socks and bracelet for high fall risk patients  - Consider moving patient to room near nurses station  3/30/2022 1135 by Rima Ovalles LPN  Outcome: Adequate for Discharge  3/30/2022 0749 by Rima Ovalles LPN  Outcome: Progressing  Goal: Maintain or return to baseline ADL function  Description: INTERVENTIONS:  -  Assess patient's ability to carry out ADLs; assess patient's baseline for ADL function and identify physical deficits which impact ability to perform ADLs (bathing, care of mouth/teeth, toileting, grooming, dressing, etc )  - Assess/evaluate cause of self-care deficits   - Assess range of motion  - Assess patient's mobility; develop plan if impaired  - Assess patient's need for assistive devices and provide as appropriate  - Encourage maximum independence but intervene and supervise when necessary  - Involve family in performance of ADLs  - Assess for home care needs following discharge   - Consider OT consult to assist with ADL evaluation and planning for discharge  - Provide patient education as appropriate  3/30/2022 1135 by Rima Ovalles LPN  Outcome: Adequate for Discharge  3/30/2022 0749 by Rima Ovalles LPN  Outcome: Progressing  Goal: Maintains/Returns to pre admission functional level  Description: INTERVENTIONS:  - Perform BMAT or MOVE assessment daily    - Set and communicate daily mobility goal to care team and patient/family/caregiver  - Collaborate with rehabilitation services on mobility goals if consulted  - Perform Range of Motion three times a day    - Dangle patient three times a day  - Stand patient three times a day  - Ambulate patient three times a day  - Out of bed to chair three times a day   - Out of bed for meals three times a day  - Out of bed for toileting  - Record patient progress and toleration of activity level   3/30/2022 1135 by Rima Ovalles NAMAN  Outcome: Adequate for Discharge  3/30/2022 0749 by Miguelangel Keene LPN  Outcome: Progressing     Problem: DISCHARGE PLANNING  Goal: Discharge to home or other facility with appropriate resources  Description: INTERVENTIONS:  - Identify barriers to discharge w/patient and caregiver  - Arrange for needed discharge resources and transportation as appropriate  - Identify discharge learning needs (meds, wound care, etc )  - Arrange for interpretive services to assist at discharge as needed  - Refer to Case Management Department for coordinating discharge planning if the patient needs post-hospital services based on physician/advanced practitioner order or complex needs related to functional status, cognitive ability, or social support system  3/30/2022 1135 by Miguelangel Keene LPN  Outcome: Adequate for Discharge  3/30/2022 0749 by Miguelangel Keene LPN  Outcome: Progressing     Problem: Knowledge Deficit  Goal: Patient/family/caregiver demonstrates understanding of disease process, treatment plan, medications, and discharge instructions  Description: Complete learning assessment and assess knowledge base    Interventions:  - Provide teaching at level of understanding  - Provide teaching via preferred learning methods  3/30/2022 1135 by Miguelangel Keene LPN  Outcome: Adequate for Discharge  3/30/2022 0749 by Miguelangel Keene LPN  Outcome: Progressing     Problem: GASTROINTESTINAL - ADULT  Goal: Minimal or absence of nausea and/or vomiting  Description: INTERVENTIONS:  - Administer IV fluids if ordered to ensure adequate hydration  - Maintain NPO status until nausea and vomiting are resolved  - Nasogastric tube if ordered  - Administer ordered antiemetic medications as needed  - Provide nonpharmacologic comfort measures as appropriate  - Advance diet as tolerated, if ordered  - Consider nutrition services referral to assist patient with adequate nutrition and appropriate food choices  3/30/2022 1135 by Miguelangel Keene NAMAN  Outcome: Adequate for Discharge  3/30/2022 0749 by Marcus Arellano LPN  Outcome: Progressing  Goal: Maintains or returns to baseline bowel function  Description: INTERVENTIONS:  - Assess bowel function  - Encourage oral fluids to ensure adequate hydration  - Administer IV fluids if ordered to ensure adequate hydration  - Administer ordered medications as needed  - Encourage mobilization and activity  - Consider nutritional services referral to assist patient with adequate nutrition and appropriate food choices  3/30/2022 1135 by Marcus Arellano LPN  Outcome: Adequate for Discharge  3/30/2022 0749 by Marcus Arellano LPN  Outcome: Progressing  Goal: Maintains adequate nutritional intake  Description: INTERVENTIONS:  - Monitor percentage of each meal consumed  - Identify factors contributing to decreased intake, treat as appropriate  - Assist with meals as needed  - Monitor I&O, weight, and lab values if indicated  - Obtain nutrition services referral as needed  3/30/2022 1135 by Marcus Arellano LPN  Outcome: Adequate for Discharge  3/30/2022 0749 by Marcus Arellano LPN  Outcome: Progressing     Problem: Nutrition/Hydration-ADULT  Goal: Nutrient/Hydration intake appropriate for improving, restoring or maintaining nutritional needs  Description: Monitor and assess patient's nutrition/hydration status for malnutrition  Collaborate with interdisciplinary team and initiate plan and interventions as ordered  Monitor patient's weight and dietary intake as ordered or per policy  Utilize nutrition screening tool and intervene as necessary  Determine patient's food preferences and provide high-protein, high-caloric foods as appropriate       INTERVENTIONS:  - Monitor oral intake, urinary output, labs, and treatment plans  - Assess nutrition and hydration status and recommend course of action  - Evaluate amount of meals eaten  - Assist patient with eating if necessary   - Allow adequate time for meals  - Recommend/ encourage appropriate diets, oral nutritional supplements, and vitamin/mineral supplements  - Order, calculate, and assess calorie counts as needed  - Recommend, monitor, and adjust tube feedings and TPN/PPN based on assessed needs  - Assess need for intravenous fluids  - Provide specific nutrition/hydration education as appropriate  - Include patient/family/caregiver in decisions related to nutrition  3/30/2022 1135 by Zoltan Frye LPN  Outcome: Adequate for Discharge  3/30/2022 0749 by Zoltan Frye LPN  Outcome: Progressing     Problem: HEMATOLOGIC - ADULT  Goal: Maintains hematologic stability  Description: INTERVENTIONS  - Assess for signs and symptoms of bleeding or hemorrhage  - Monitor labs  - Administer supportive blood products/factors as ordered and appropriate  3/30/2022 1135 by Zoltan Frye LPN  Outcome: Adequate for Discharge  3/30/2022 0749 by Zoltan Frye LPN  Outcome: Progressing     Problem: Potential for Falls  Goal: Patient will remain free of falls  Description: INTERVENTIONS:  - Educate patient/family on patient safety including physical limitations  - Instruct patient to call for assistance with activity   - Consult OT/PT to assist with strengthening/mobility   - Keep Call bell within reach  - Keep bed low and locked with side rails adjusted as appropriate  - Keep care items and personal belongings within reach  - Initiate and maintain comfort rounds  - Make Fall Risk Sign visible to staff  - Offer Toileting every two Hours, in advance of need  - Obtain necessary fall risk management equipment: non slip socks   - Apply yellow socks and bracelet for high fall risk patients  - Consider moving patient to room near nurses station  3/30/2022 1135 by Zoltan Frye LPN  Outcome: Adequate for Discharge  3/30/2022 0749 by Zoltan Frye LPN  Outcome: Progressing

## 2022-03-30 NOTE — PROGRESS NOTES
Progress Note - General Surgery   Alexandra Castillo 57 y o  male MRN: [id number]  Unit/Bed#: 406-01 Encounter: [id number]    Assessment:  Patient passing flatus  Had small this morning, black in color  Denies any abdominal pain  Tolerating soft diet, no nausea or vomiting  Hemoglobin stable 16 5  Will resolved partial small-bowel obstruction  Patient never had any abdominal surgery or history of any inflammatory bowel disease  CT AP showed multiple loops of moderate small bowel distention involving jejunum and proximal ileum, repeat obstruction series done on March 28th showed improved distension  No definite transition point  The patient has not been on any aspirin or ibuprofen products  Last colonoscopy was about a year ago in [place] by Dr Chandan Duval  Patient had heme-positive gastric NG tube drainage and also Hemoccult-positive stool  Possibly secondary to traumatic insertion of NG tube placed on admission  CT scan abdomen pelvis done on March 27th showed an 11 mm right lower pole renal calculus, nonobstructing  Diverticulosis without evidence of acute diverticulitis  Plan:  Discussed in person with Dr Robert Davis, attending physician  Patient may be discharged home by Medicine today  Follow-up with Dr Chandan Duval in the office 2 or 3 weeks  Discharge with PPI therapy  Patient should avoid aspirin or NSAID products  No indication for any surgical intervention at this time  Patient previously was seen by Dr Monica Yang, urologist in [place] who is no longer in practice according to the patient  Patient can follow-up with urology, Dr Lashay Terrell here in [place], as an outpatient to discuss the nonobstructing mm right lower pole renal calculus, he was instructed that he will need to call to set up an appointment date and time with urology    Likely no urological intervention will be necessary as the kidney stone resides within the renal pelvis and is nonobstructing  Subjective/Objective   Chief Complaint:  Patient denies abdominal pain  Tolerating soft diet  Passing flatus per had a small pellet sized stool, described as black in color this morning  He is anxious to go home  Subjective:  69-year-old white male who has been relatively in good health presented over the weekend with upper abdominal pain with nausea and vomiting  NG tube was inserted and then removed 2 nights ago as it was blocked  The patient had some blood noted, likely from traumatic NG tube insertion  Diet was advanced yesterday, he is now tolerating surgical soft diet  Denies any complaints  Yesterday the patient had 3 tarry black bowel movements  His hemoglobin was stable  Personally discussed with Gastroenterology here  No indication for inpatient evaluation, the patient has been followed in the past by Dr Veronica Joshua , gastroenterologist in [place]  Apparently the patient has been in telephone conversation with Dr Juliana Warner during his hospital stay here  Yesterday on exam the patient had very rare and infrequent bowel sounds, his abdomen was soft and not frankly tender to palpation  There was concern that he was passing black tarry stools  Scheduled Meds:  Current Facility-Administered Medications   Medication Dose Route Frequency Provider Last Rate    atorvastatin  20 mg Oral Daily Luma Powell PA-C      ezetimibe  10 mg Oral Daily Luma Powell PA-C      hydrALAZINE  5 mg Intravenous Q6H [address] Luma Powell PA-C      HYDROmorphone  0 5 mg Intravenous Q4H PRN Luma Powell PA-C      levothyroxine  100 mcg Oral Early Morning Luma Powell PA-C      ondansetron  4 mg Intravenous Q6H PRN Luma Powell PA-C      pantoprazole  40 mg Intravenous Q12H [address] Davi Guardado PA-C      phenol  1 spray Mouth/Throat Q2H PRN Jackie Gomez MD       Continuous Infusions:   PRN Meds:  HYDROmorphone    ondansetron   phenol    Objective:     Blood pressure 138/94, pulse 88, temperature 98 °F (36 7 °C), temperature source Oral, resp  rate 16, height 5' 10" (1 778 m), weight 101 kg (223 lb 1 7 oz), SpO2 97 %  ,Body mass index is 32 01 kg/m²  Intake/Output Summary (Last 24 hours) at 3/30/2022 0942  Last data filed at 3/30/2022 6900  Gross per 24 hour   Intake 2120 ml   Output 100 ml   Net 2020 ml       Invasive Devices  Report    Peripheral Intravenous Line            Peripheral IV 03/29/22 Left Wrist <1 day                Physical Exam:     Awake alert  Seems in good spirits  Skin warm dry to touch, well tanned  Normocephalic, PERRLA, EOMI  Oral mucosa pink and moist   Heart regular rate and rhythm  No murmur gallop  Lungs clear to auscultation  No rales or rhonchi heard  Back no flank tenderness to percussion  Abdomen nondistended  No scars noted  Positive bowel sounds, now active in 4 quadrants  No guarding, no abdominal tenderness  No masses are felt  No abdominal hernias are palpable  Extremities without calf tenderness, no peripheral edema  Neurological exam shows baseline mental status  No tremor noted  No focal motor sensory neurologic weakness is apparent  Normal independent ambulation noted  Lab, Imaging and other studies:  I have personally reviewed pertinent lab results    , CBC:   Lab Results   Component Value Date    WBC 6 48 03/30/2022    HGB 15 6 03/30/2022    HCT 47 4 03/30/2022    MCV 92 03/30/2022     03/30/2022    MCH 30 3 03/30/2022    MCHC 32 9 03/30/2022    RDW 13 1 03/30/2022    MPV 10 4 03/30/2022    NRBC 0 03/30/2022   , CMP:   Lab Results   Component Value Date    SODIUM 138 03/30/2022    K 4 2 03/30/2022     03/30/2022    CO2 26 03/30/2022    BUN 8 03/30/2022    CREATININE 1 04 03/30/2022    CALCIUM 8 3 03/30/2022    EGFR 76 03/30/2022   , Coagulation: No results found for: PT, INR, APTT  VTE Pharmacologic Prophylaxis: Reason for no pharmacologic prophylaxis Heme-positive stool    VTE Mechanical Prophylaxis: sequential compression device     Avelino Askew PA-C

## 2022-03-31 NOTE — UTILIZATION REVIEW
Notification of Discharge   This is a Notification of Discharge from our facility [address]  Please be advised that this patient has been discharge from our facility  Below you will find the admission and discharge date and time including the patients disposition  UTILIZATION REVIEW CONTACT:  Felipa Pennington  Utilization   Network Utilization Review Department  Phone: [phone] x carefully listen to the prompts  All voicemails are confidential   Email: [email or web address]     PHYSICIAN ADVISORY SERVICES:  FOR [id number] REVIEW - MEDICAL NECESSITY DENIAL  Phone: [phone]  Fax: [phone]  Email: [email or web address]     PRESENTATION DATE: 3/27/2022  2:51 PM  OBERVATION ADMISSION DATE:   INPATIENT ADMISSION DATE: 3/27/22  7:04 PM   DISCHARGE DATE: 3/30/2022 11:46 AM  DISPOSITION: Home/Self Care Home/Self Care      IMPORTANT INFORMATION:  Send all requests for admission clinical reviews, approved or denied determinations and any other requests to dedicated fax number below belonging to the campus where the patient is receiving treatment   List of dedicated fax numbers:  [address] DENIALS (Administrative/Medical Necessity) [phone]   [address] (Maternity/NICU/Pediatrics) [phone]   [facility] [phone]   [address] [facility] [phone]   [address] [place] [address] [phone]   [address] [phone]   [facility]  [phone]   [address] [place], [place] [address] [facility] [address] [facility] [phone]

## 2022-04-02 ENCOUNTER — APPOINTMENT (OUTPATIENT)
Dept: LAB | Facility: MEDICAL CENTER | Age: 62
End: 2022-04-02
Payer: COMMERCIAL

## 2022-04-02 DIAGNOSIS — N17.9 AKI (ACUTE KIDNEY INJURY) (HCC): ICD-10-CM

## 2022-04-02 DIAGNOSIS — K92.1 BLACK STOOL: ICD-10-CM

## 2022-04-02 LAB
ANION GAP SERPL CALCULATED.3IONS-SCNC: 3 MMOL/L (ref 4–13)
ARTIFACT: PRESENT
BASOPHILS # BLD MANUAL: 0.21 THOUSAND/UL (ref 0–0.1)
BASOPHILS NFR MAR MANUAL: 3 % (ref 0–1)
BUN SERPL-MCNC: 16 MG/DL (ref 5–25)
CALCIUM SERPL-MCNC: 9.1 MG/DL (ref 8.3–10.1)
CHLORIDE SERPL-SCNC: 107 MMOL/L (ref 100–108)
CO2 SERPL-SCNC: 29 MMOL/L (ref 21–32)
CREAT SERPL-MCNC: 1.09 MG/DL (ref 0.6–1.3)
EOSINOPHIL # BLD MANUAL: 0.07 THOUSAND/UL (ref 0–0.4)
EOSINOPHIL NFR BLD MANUAL: 1 % (ref 0–6)
ERYTHROCYTE [DISTWIDTH] IN BLOOD BY AUTOMATED COUNT: 13.1 % (ref 11.6–15.1)
GFR SERPL CREATININE-BSD FRML MDRD: 72 ML/MIN/1.73SQ M
GIANT PLATELETS BLD QL SMEAR: PRESENT
GLUCOSE P FAST SERPL-MCNC: 97 MG/DL (ref 65–99)
HCT VFR BLD AUTO: 48.8 % (ref 36.5–49.3)
HGB BLD-MCNC: 15.8 G/DL (ref 12–17)
LYMPHOCYTES # BLD AUTO: 1.76 THOUSAND/UL (ref 0.6–4.47)
LYMPHOCYTES # BLD AUTO: 25 % (ref 14–44)
MCH RBC QN AUTO: 30.1 PG (ref 26.8–34.3)
MCHC RBC AUTO-ENTMCNC: 32.4 G/DL (ref 31.4–37.4)
MCV RBC AUTO: 93 FL (ref 82–98)
METAMYELOCYTES NFR BLD MANUAL: 1 % (ref 0–1)
MONOCYTES # BLD AUTO: 0.35 THOUSAND/UL (ref 0–1.22)
MONOCYTES NFR BLD: 5 % (ref 4–12)
NEUTROPHILS # BLD MANUAL: 4.29 THOUSAND/UL (ref 1.85–7.62)
NEUTS BAND NFR BLD MANUAL: 15 % (ref 0–8)
NEUTS SEG NFR BLD AUTO: 46 % (ref 43–75)
PLATELET # BLD AUTO: 243 THOUSANDS/UL (ref 149–390)
PLATELET BLD QL SMEAR: ADEQUATE
PMV BLD AUTO: 11.1 FL (ref 8.9–12.7)
POTASSIUM SERPL-SCNC: 4.4 MMOL/L (ref 3.5–5.3)
RBC # BLD AUTO: 5.25 MILLION/UL (ref 3.88–5.62)
SODIUM SERPL-SCNC: 139 MMOL/L (ref 136–145)
VARIANT LYMPHS # BLD AUTO: 4 %
WBC # BLD AUTO: 7.04 THOUSAND/UL (ref 4.31–10.16)

## 2022-04-02 PROCEDURE — 85027 COMPLETE CBC AUTOMATED: CPT

## 2022-04-02 PROCEDURE — 85007 BL SMEAR W/DIFF WBC COUNT: CPT

## 2022-04-02 PROCEDURE — 36415 COLL VENOUS BLD VENIPUNCTURE: CPT

## 2022-04-02 PROCEDURE — 80048 BASIC METABOLIC PNL TOTAL CA: CPT

## 2023-01-11 ENCOUNTER — APPOINTMENT (OUTPATIENT)
Dept: LAB | Facility: MEDICAL CENTER | Age: 63
End: 2023-01-11

## 2023-01-11 DIAGNOSIS — E78.00 PURE HYPERCHOLESTEROLEMIA: ICD-10-CM

## 2023-01-11 DIAGNOSIS — E03.2 IATROGENIC HYPOTHYROIDISM: ICD-10-CM

## 2023-01-11 DIAGNOSIS — R73.02 IMPAIRED GLUCOSE TOLERANCE: ICD-10-CM

## 2023-01-11 DIAGNOSIS — E55.9 VITAMIN D DEFICIENCY DISEASE: ICD-10-CM

## 2023-01-11 DIAGNOSIS — E29.1 MALE HYPOGONADISM: ICD-10-CM

## 2023-01-11 LAB
25(OH)D3 SERPL-MCNC: 52.7 NG/ML (ref 30–100)
ALBUMIN SERPL BCP-MCNC: 3.8 G/DL (ref 3.5–5)
ALP SERPL-CCNC: 81 U/L (ref 46–116)
ALT SERPL W P-5'-P-CCNC: 32 U/L (ref 12–78)
ANION GAP SERPL CALCULATED.3IONS-SCNC: 2 MMOL/L (ref 4–13)
AST SERPL W P-5'-P-CCNC: 25 U/L (ref 5–45)
BILIRUB SERPL-MCNC: 0.69 MG/DL (ref 0.2–1)
BUN SERPL-MCNC: 16 MG/DL (ref 5–25)
CALCIUM SERPL-MCNC: 9.6 MG/DL (ref 8.3–10.1)
CHLORIDE SERPL-SCNC: 106 MMOL/L (ref 96–108)
CHOLEST SERPL-MCNC: 119 MG/DL
CO2 SERPL-SCNC: 30 MMOL/L (ref 21–32)
CREAT SERPL-MCNC: 1.16 MG/DL (ref 0.6–1.3)
ERYTHROCYTE [DISTWIDTH] IN BLOOD BY AUTOMATED COUNT: 12.5 % (ref 11.6–15.1)
ESTRADIOL SERPL-MCNC: 32 PG/ML (ref 11–52.5)
GFR SERPL CREATININE-BSD FRML MDRD: 66 ML/MIN/1.73SQ M
GLUCOSE P FAST SERPL-MCNC: 95 MG/DL (ref 65–99)
HCT VFR BLD AUTO: 50.9 % (ref 36.5–49.3)
HDLC SERPL-MCNC: 54 MG/DL
HGB BLD-MCNC: 15.8 G/DL (ref 12–17)
LDLC SERPL CALC-MCNC: 50 MG/DL (ref 0–100)
MCH RBC QN AUTO: 28.9 PG (ref 26.8–34.3)
MCHC RBC AUTO-ENTMCNC: 31 G/DL (ref 31.4–37.4)
MCV RBC AUTO: 93 FL (ref 82–98)
NONHDLC SERPL-MCNC: 65 MG/DL
PLATELET # BLD AUTO: 196 THOUSANDS/UL (ref 149–390)
PMV BLD AUTO: 11.3 FL (ref 8.9–12.7)
POTASSIUM SERPL-SCNC: 4.7 MMOL/L (ref 3.5–5.3)
PROT SERPL-MCNC: 7.6 G/DL (ref 6.4–8.4)
RBC # BLD AUTO: 5.46 MILLION/UL (ref 3.88–5.62)
SODIUM SERPL-SCNC: 138 MMOL/L (ref 135–147)
T3FREE SERPL-MCNC: 2.61 PG/ML (ref 2.3–4.2)
T4 FREE SERPL-MCNC: 1.05 NG/DL (ref 0.76–1.46)
TRIGL SERPL-MCNC: 77 MG/DL
TSH SERPL DL<=0.05 MIU/L-ACNC: 0.9 UIU/ML (ref 0.45–4.5)
WBC # BLD AUTO: 6.06 THOUSAND/UL (ref 4.31–10.16)

## 2023-01-12 LAB
EST. AVERAGE GLUCOSE BLD GHB EST-MCNC: 123 MG/DL
HBA1C MFR BLD: 5.9 %
TESTOST FREE SERPL-MCNC: 8.9 PG/ML (ref 6.6–18.1)
TESTOST SERPL-MCNC: 499 NG/DL (ref 264–916)

## 2023-01-18 LAB — DHEA SERPL-MCNC: 78 NG/DL (ref 31–701)

## 2023-04-10 ENCOUNTER — APPOINTMENT (RX ONLY)
Dept: URBAN - NONMETROPOLITAN AREA CLINIC 4 | Facility: CLINIC | Age: 63
Setting detail: DERMATOLOGY
End: 2023-04-10

## 2023-04-10 DIAGNOSIS — L82.0 INFLAMED SEBORRHEIC KERATOSIS: ICD-10-CM

## 2023-04-10 DIAGNOSIS — L57.0 ACTINIC KERATOSIS: ICD-10-CM

## 2023-04-10 PROCEDURE — ? LIQUID NITROGEN

## 2023-04-10 PROCEDURE — ? COUNSELING

## 2023-04-10 PROCEDURE — 17000 DESTRUCT PREMALG LESION: CPT | Mod: 59

## 2023-04-10 PROCEDURE — ? SUNSCREEN RECOMMENDATIONS

## 2023-04-10 PROCEDURE — 17003 DESTRUCT PREMALG LES 2-14: CPT | Mod: 59

## 2023-04-10 PROCEDURE — 17110 DESTRUCTION B9 LES UP TO 14: CPT

## 2023-04-10 ASSESSMENT — LOCATION DETAILED DESCRIPTION DERM
LOCATION DETAILED: RIGHT MEDIAL FRONTAL SCALP
LOCATION DETAILED: RIGHT SUPERIOR MEDIAL UPPER BACK
LOCATION DETAILED: RIGHT SUPERIOR PARIETAL SCALP

## 2023-04-10 ASSESSMENT — LOCATION SIMPLE DESCRIPTION DERM
LOCATION SIMPLE: RIGHT SCALP
LOCATION SIMPLE: RIGHT UPPER BACK
LOCATION SIMPLE: SCALP

## 2023-04-10 ASSESSMENT — LOCATION ZONE DERM
LOCATION ZONE: SCALP
LOCATION ZONE: TRUNK

## 2023-04-10 ASSESSMENT — TOTAL NUMBER OF LESIONS: # OF LESIONS?: 2

## 2023-04-10 ASSESSMENT — PAIN INTENSITY VAS: HOW INTENSE IS YOUR PAIN 0 BEING NO PAIN, 10 BEING THE MOST SEVERE PAIN POSSIBLE?: NO PAIN

## 2023-04-10 NOTE — PROCEDURE: LIQUID NITROGEN
Show Applicator Variable?: Yes
Post-Care Instructions: I reviewed with the patient in detail post-care instructions. Patient is to wear sunprotection, and avoid picking at any of the treated lesions. Pt may apply Vaseline to crusted or scabbing areas.
Medical Necessity Information: It is in your best interest to select a reason for this procedure from the list below. All of these items fulfill various CMS LCD requirements except the new and changing color options.
Render Post-Care Instructions In Note?: no
Detail Level: Detailed
Consent: The patient's consent was obtained including but not limited to risks of crusting, scabbing, blistering, scarring, darker or lighter pigmentary change, recurrence, incomplete removal and infection.
Medical Necessity Clause: This procedure was medically necessary because the lesions that were treated were:
Spray Paint Text: The liquid nitrogen was applied to the skin utilizing a spray paint frosting technique.
Duration Of Freeze Thaw-Cycle (Seconds): 5
Number Of Freeze-Thaw Cycles: 2 freeze-thaw cycles
Detail Level: Zone
Application Tool (Optional): Cry-AC

## 2023-05-04 ENCOUNTER — TRANSCRIBE ORDERS (OUTPATIENT)
Dept: LAB | Facility: MEDICAL CENTER | Age: 63
End: 2023-05-04

## 2023-05-04 ENCOUNTER — APPOINTMENT (OUTPATIENT)
Dept: LAB | Facility: MEDICAL CENTER | Age: 63
End: 2023-05-04

## 2023-05-04 DIAGNOSIS — E03.9 ACQUIRED HYPOTHYROIDISM: Primary | ICD-10-CM

## 2023-05-04 DIAGNOSIS — E03.9 ACQUIRED HYPOTHYROIDISM: ICD-10-CM

## 2023-05-04 LAB — TSH SERPL DL<=0.05 MIU/L-ACNC: 0.3 UIU/ML (ref 0.45–4.5)

## 2023-05-24 ENCOUNTER — TELEPHONE (OUTPATIENT)
Dept: NON INVASIVE DIAGNOSTICS | Facility: HOSPITAL | Age: 63
End: 2023-05-24

## 2023-05-24 NOTE — TELEPHONE ENCOUNTER
Hello! Can you please get this patient scheduled for a new patient appointment with Dr Jaja Rosas for palpitations? Thank you!

## 2023-06-12 ENCOUNTER — APPOINTMENT (OUTPATIENT)
Dept: LAB | Facility: MEDICAL CENTER | Age: 63
End: 2023-06-12
Payer: COMMERCIAL

## 2023-06-12 ENCOUNTER — APPOINTMENT (RX ONLY)
Dept: URBAN - NONMETROPOLITAN AREA CLINIC 4 | Facility: CLINIC | Age: 63
Setting detail: DERMATOLOGY
End: 2023-06-12

## 2023-06-12 DIAGNOSIS — L57.8 OTHER SKIN CHANGES DUE TO CHRONIC EXPOSURE TO NONIONIZING RADIATION: ICD-10-CM

## 2023-06-12 DIAGNOSIS — E03.9 ACQUIRED HYPOTHYROIDISM: ICD-10-CM

## 2023-06-12 DIAGNOSIS — D22 MELANOCYTIC NEVI: ICD-10-CM

## 2023-06-12 PROBLEM — D22.5 MELANOCYTIC NEVI OF TRUNK: Status: ACTIVE | Noted: 2023-06-12

## 2023-06-12 LAB
T4 FREE SERPL-MCNC: 1 NG/DL (ref 0.61–1.12)
TSH SERPL DL<=0.05 MIU/L-ACNC: 0.14 UIU/ML (ref 0.45–4.5)

## 2023-06-12 PROCEDURE — ? OBSERVATION

## 2023-06-12 PROCEDURE — ? FULL BODY SKIN EXAM

## 2023-06-12 PROCEDURE — 84439 ASSAY OF FREE THYROXINE: CPT

## 2023-06-12 PROCEDURE — ? PHOTO-DOCUMENTATION

## 2023-06-12 PROCEDURE — 99213 OFFICE O/P EST LOW 20 MIN: CPT

## 2023-06-12 PROCEDURE — ? SUNSCREEN RECOMMENDATIONS

## 2023-06-12 PROCEDURE — 84443 ASSAY THYROID STIM HORMONE: CPT

## 2023-06-12 PROCEDURE — ? COUNSELING

## 2023-06-12 ASSESSMENT — LOCATION SIMPLE DESCRIPTION DERM
LOCATION SIMPLE: RIGHT UPPER BACK
LOCATION SIMPLE: LOWER BACK
LOCATION SIMPLE: LEFT SHOULDER
LOCATION SIMPLE: POSTERIOR SCALP
LOCATION SIMPLE: RIGHT SHOULDER

## 2023-06-12 ASSESSMENT — LOCATION DETAILED DESCRIPTION DERM
LOCATION DETAILED: LEFT ANTERIOR SHOULDER
LOCATION DETAILED: SUPERIOR LUMBAR SPINE
LOCATION DETAILED: POSTERIOR MID-PARIETAL SCALP
LOCATION DETAILED: RIGHT ANTERIOR SHOULDER
LOCATION DETAILED: RIGHT MEDIAL UPPER BACK

## 2023-06-12 ASSESSMENT — LOCATION ZONE DERM
LOCATION ZONE: TRUNK
LOCATION ZONE: SCALP
LOCATION ZONE: ARM

## 2023-06-12 NOTE — PROCEDURE: OBSERVATION
Body Location Override (Optional - Billing Will Still Be Based On Selected Body Map Location If Applicable): superior lumbar spine
Detail Level: Detailed
Size Of Lesion In Cm (Optional): 0

## 2023-11-13 ENCOUNTER — APPOINTMENT (RX ONLY)
Dept: URBAN - NONMETROPOLITAN AREA CLINIC 4 | Facility: CLINIC | Age: 63
Setting detail: DERMATOLOGY
End: 2023-11-13

## 2023-11-13 DIAGNOSIS — L57.8 OTHER SKIN CHANGES DUE TO CHRONIC EXPOSURE TO NONIONIZING RADIATION: ICD-10-CM

## 2023-11-13 DIAGNOSIS — L57.0 ACTINIC KERATOSIS: ICD-10-CM | Status: RESOLVED

## 2023-11-13 DIAGNOSIS — D22 MELANOCYTIC NEVI: ICD-10-CM

## 2023-11-13 PROBLEM — D22.5 MELANOCYTIC NEVI OF TRUNK: Status: ACTIVE | Noted: 2023-11-13

## 2023-11-13 PROCEDURE — ? PHOTO-DOCUMENTATION

## 2023-11-13 PROCEDURE — ? SUNSCREEN RECOMMENDATIONS

## 2023-11-13 PROCEDURE — ? OBSERVATION

## 2023-11-13 PROCEDURE — 99213 OFFICE O/P EST LOW 20 MIN: CPT

## 2023-11-13 PROCEDURE — ? COUNSELING

## 2023-11-13 ASSESSMENT — LOCATION DETAILED DESCRIPTION DERM
LOCATION DETAILED: LEFT MEDIAL FRONTAL SCALP
LOCATION DETAILED: SUPERIOR LUMBAR SPINE
LOCATION DETAILED: POSTERIOR MID-PARIETAL SCALP

## 2023-11-13 ASSESSMENT — LOCATION ZONE DERM
LOCATION ZONE: TRUNK
LOCATION ZONE: SCALP

## 2023-11-13 ASSESSMENT — LOCATION SIMPLE DESCRIPTION DERM
LOCATION SIMPLE: POSTERIOR SCALP
LOCATION SIMPLE: LOWER BACK
LOCATION SIMPLE: LEFT SCALP

## 2023-11-13 NOTE — PROCEDURE: OBSERVATION
Body Location Override (Optional - Billing Will Still Be Based On Selected Body Map Location If Applicable): superior lumbar spine
Detail Level: Detailed
Size Of Lesion In Cm (Optional): 0.2

## 2023-12-02 NOTE — PROGRESS NOTES
[facility]          NAME: Karlos George is a 60 y o  male  : 1960    MRN: [id number]  DATE: 2019  TIME: 9:14 AM    Assessment and Plan   Acute bacterial bronchitis [J20 8, B96 89]  1  Acute bacterial bronchitis  XR chest pa & lateral    azithromycin (ZITHROMAX) 250 mg tablet   2  Hives  methylprednisolone (MEDROL) 4 mg tablet       Patient Instructions   Possible LLL pneumonia, awaiting radiologist report  Radiologist did confirm atelectasis but no consolidation  Patient was called and made aware of results  Reports he is now able to take deep breaths with no difficulty  Will start [address]  Medrol dose pack for rash  If no improvement in next 3- 5 days to follow up with PCP  Patient and wife did verbalize understanding  To present to the ER if symptoms worsen  Chief Complaint     Chief Complaint   Patient presents with    Fever     Pt c/o high fever, body aches, cough x 4 days   Rash     Pt c/o hives on the bottom of his feet that started yesterday  History of Present Illness   Rafitakyle Enid Castillo presents to the clinic c/o    Fever   This is a new problem  The current episode started in the past 7 days  The problem occurs intermittently  The problem has been waxing and waning  Associated symptoms include coughing, fatigue, a fever, headaches, nausea and a rash  Pertinent negatives include no abdominal pain, anorexia, arthralgias, change in bowel habit, chest pain, chills, congestion, diaphoresis, joint swelling, myalgias, neck pain, numbness, sore throat, swollen glands, visual change, vomiting or weakness  Nothing aggravates the symptoms  He has tried NSAIDs for the symptoms  The treatment provided no relief  Rash   This is a new problem  The current episode started in the past 7 days  The problem is unchanged  The affected locations include the left foot, left hand, left wrist, right hand, right wrist and right foot   The rash is characterized by redness  It is unknown if there was an exposure to a precipitant  Associated symptoms include coughing, fatigue and a fever  Pertinent negatives include no anorexia, congestion, diarrhea, eye pain, facial edema, joint pain, nail changes, rhinorrhea, shortness of breath, sore throat or vomiting  Past treatments include antihistamine  The treatment provided mild relief  Review of Systems   Review of Systems   Constitutional: Positive for fatigue and fever  Negative for activity change, appetite change, chills and diaphoresis  HENT: Negative for congestion, ear discharge, ear pain, facial swelling, rhinorrhea, sinus pain, sinus pressure, sneezing and sore throat  Eyes: Negative for photophobia, pain, discharge, redness, itching and visual disturbance  Respiratory: Positive for cough  Negative for apnea, chest tightness, shortness of breath and wheezing  Cardiovascular: Negative for chest pain  Gastrointestinal: Positive for nausea  Negative for abdominal distention, abdominal pain, anal bleeding, anorexia, blood in stool, change in bowel habit, constipation, diarrhea and vomiting  Genitourinary: Negative for dysuria, flank pain, frequency, hematuria and urgency  Musculoskeletal: Negative for arthralgias, back pain, gait problem, joint pain, joint swelling, myalgias, neck pain and neck stiffness  Skin: Positive for rash  Negative for color change, nail changes and wound  Allergic/Immunologic: Negative for immunocompromised state  Neurological: Positive for headaches  Negative for dizziness, facial asymmetry, weakness and numbness  Hematological: Negative for adenopathy  Psychiatric/Behavioral: Negative for confusion and decreased concentration           Current Medications     Long-Term Prescriptions   Medication Sig Dispense Refill    atorvastatin (LIPITOR) 20 mg tablet Take 20 mg by mouth daily      ezetimibe (ZETIA) 10 mg tablet Take 10 mg by mouth daily      levothyroxine 100 mcg tablet Take 100 mcg by mouth daily      lisinopril (ZESTRIL) 10 mg tablet Take 10 mg by mouth daily      losartan (COZAAR) 50 mg tablet Take 50 mg by mouth      pantoprazole (PROTONIX) 40 mg tablet Take 40 mg by mouth daily      rosuvastatin (CRESTOR) 10 MG tablet Take by mouth         Current Allergies     Allergies as of 02/02/2019    (No Known Allergies)            The following portions of the patient's history were reviewed and updated as appropriate: allergies, current medications, past family history, past medical history, past social history, past surgical history and problem list   Past Medical History:   Diagnosis Date    Disease of thyroid gland     GERD (gastroesophageal reflux disease)     Hyperlipidemia     Hypertension      Past Surgical History:   Procedure Laterality Date    CARPAL TUNNEL RELEASE      TONSILLECTOMY       Social History     Social History    Marital status: /Civil Union     Spouse name: N/A    Number of children: N/A    Years of education: N/A     Occupational History    Not on file  Social History Main Topics    Smoking status: Never Smoker    Smokeless tobacco: Not on file    Alcohol use Yes      Comment: socially    Drug use: No    Sexual activity: Not on file     Other Topics Concern    Not on file     Social History Narrative    No narrative on file       Objective   /91 (BP Location: Left arm)   Pulse 101   Temp (!) 101 7 °F (38 7 °C)   Resp 18   Ht 5' 10" (1 778 m)   Wt 96 2 kg (212 lb)   SpO2 97%   BMI 30 42 kg/m²      Physical Exam     Physical Exam   Constitutional: He is oriented to person, place, and time  He appears well-developed and well-nourished  No distress  HENT:   Head: Normocephalic and atraumatic  Right Ear: Tympanic membrane and external ear normal    Left Ear: Tympanic membrane and external ear normal    Nose: Nose normal    Mouth/Throat: Oropharynx is clear and moist  No oropharyngeal exudate     Eyes: Pupils are equal, round, and reactive to light  Conjunctivae and EOM are normal  Right eye exhibits no discharge  Left eye exhibits no discharge  No scleral icterus  Neck: Normal range of motion  Neck supple  No JVD present  No tracheal deviation present  No thyromegaly present  Cardiovascular: Normal rate, regular rhythm and normal heart sounds  Exam reveals no gallop and no friction rub  No murmur heard  Pulmonary/Chest: Effort normal  No stridor  No respiratory distress  He has decreased breath sounds in the right lower field and the left lower field  He has no wheezes  He has no rhonchi  He has no rales  He exhibits no tenderness  Musculoskeletal: Normal range of motion  He exhibits no tenderness or deformity  Lymphadenopathy:     He has no cervical adenopathy  Neurological: He is alert and oriented to person, place, and time  He has normal reflexes  Coordination normal    Skin: Skin is warm and dry  Rash noted  Rash is urticarial (plantar and dorsal surface of bilateral feet, few on bilateral hands/wrists)  He is not diaphoretic  No erythema  No pallor  Psychiatric: He has a normal mood and affect  His behavior is normal  Judgment and thought content normal    Nursing note and vitals reviewed        Ela Toussaint PA-C No

## 2023-12-08 ENCOUNTER — APPOINTMENT (OUTPATIENT)
Dept: LAB | Facility: MEDICAL CENTER | Age: 63
End: 2023-12-08
Payer: COMMERCIAL

## 2023-12-08 DIAGNOSIS — E03.9 HYPOTHYROIDISM, UNSPECIFIED TYPE: ICD-10-CM

## 2023-12-08 LAB — TSH SERPL DL<=0.05 MIU/L-ACNC: 1.72 UIU/ML (ref 0.45–4.5)

## 2023-12-08 PROCEDURE — 84443 ASSAY THYROID STIM HORMONE: CPT

## 2023-12-08 PROCEDURE — 36415 COLL VENOUS BLD VENIPUNCTURE: CPT

## 2024-01-22 ENCOUNTER — OFFICE VISIT (OUTPATIENT)
Dept: URGENT CARE | Facility: MEDICAL CENTER | Age: 64
End: 2024-01-22
Payer: COMMERCIAL

## 2024-01-22 ENCOUNTER — HOSPITAL ENCOUNTER (EMERGENCY)
Facility: HOSPITAL | Age: 64
Discharge: HOME | End: 2024-01-22
Attending: EMERGENCY MEDICINE
Payer: COMMERCIAL

## 2024-01-22 ENCOUNTER — APPOINTMENT (EMERGENCY)
Dept: NON INVASIVE DIAGNOSTICS | Facility: HOSPITAL | Age: 64
End: 2024-01-22
Payer: COMMERCIAL

## 2024-01-22 VITALS
RESPIRATION RATE: 18 BRPM | HEART RATE: 75 BPM | TEMPERATURE: 97.9 F | SYSTOLIC BLOOD PRESSURE: 139 MMHG | DIASTOLIC BLOOD PRESSURE: 84 MMHG | OXYGEN SATURATION: 93 %

## 2024-01-22 VITALS
HEART RATE: 77 BPM | SYSTOLIC BLOOD PRESSURE: 150 MMHG | HEIGHT: 70 IN | WEIGHT: 214 LBS | DIASTOLIC BLOOD PRESSURE: 88 MMHG | OXYGEN SATURATION: 98 % | BODY MASS INDEX: 30.64 KG/M2 | RESPIRATION RATE: 18 BRPM | TEMPERATURE: 98.1 F

## 2024-01-22 DIAGNOSIS — M79.89 PAIN AND SWELLING OF RIGHT LOWER LEG: Primary | ICD-10-CM

## 2024-01-22 DIAGNOSIS — M79.89 RIGHT LEG SWELLING: Primary | ICD-10-CM

## 2024-01-22 DIAGNOSIS — M79.661 PAIN AND SWELLING OF RIGHT LOWER LEG: Primary | ICD-10-CM

## 2024-01-22 PROCEDURE — 99213 OFFICE O/P EST LOW 20 MIN: CPT

## 2024-01-22 PROCEDURE — 93971 EXTREMITY STUDY: CPT | Performed by: SURGERY

## 2024-01-22 PROCEDURE — 99283 EMERGENCY DEPT VISIT LOW MDM: CPT

## 2024-01-22 PROCEDURE — 99284 EMERGENCY DEPT VISIT MOD MDM: CPT | Performed by: PHYSICIAN ASSISTANT

## 2024-01-22 PROCEDURE — 93971 EXTREMITY STUDY: CPT

## 2024-01-22 NOTE — PROGRESS NOTES
[facility]        NAME: Andriy Castillo is a 63 y.o. male  : 1960    MRN: [id number]  DATE: 2024  TIME: 11:30 AM    Assessment and Plan   Right leg swelling [M79.89]  1. Right leg swelling  Transfer to other facility        Discussed problem with patient.  New onset of pitting edema to right leg.  Does have some tenderness along the deep venous system but Homans' sign negative.  Advised higher level of care patient is opting to report to [facility].    Patient Instructions       Follow up with PCP in 3-5 days.  Proceed to  ER if symptoms worsen.    Chief Complaint     Chief Complaint   Patient presents with   • Knee Pain     Right  knee pain that was hurting since last week , over the weekend has iced the knee and used his 10 unit and took advil . Is now having pain in the right calf area .calf area warm and swollen         History of Present Illness       Right  knee pain that was hurting since last week, over the weekend has iced the knee and used his 10 unit and took advil . Is now having pain in the right calf area .calf area warm and swollen. States he was also skiing last week. States symptoms started with anterior right knee pain, swollen. Conservative management helped. Now is having swelling over calf and tenderness. Pmh of a fib, was supposed to have ablation but made dietary changes and haven't had a episode since . Not on any blood thinners. 3/10.     Knee Pain         Review of Systems   Review of Systems   Constitutional:  Negative for appetite change, chills, fatigue and fever.   Respiratory:  Negative for cough, shortness of breath, wheezing and stridor.    Musculoskeletal:  Positive for joint swelling. Negative for gait problem.   Neurological:  Negative for dizziness, syncope, light-headedness and headaches.         Current Medications       Current Outpatient Medications:   •  atorvastatin (LIPITOR) 20 mg tablet, Take 20 mg by mouth daily, Disp: ,  "Rfl:   •  candesartan (ATACAND) 16 mg tablet, Take 16 mg by mouth daily, Disp: , Rfl:   •  ezetimibe (ZETIA) 10 mg tablet, Take 10 mg by mouth daily, Disp: , Rfl:   •  levothyroxine 100 mcg tablet, Take 100 mcg by mouth daily, Disp: , Rfl:   •  pantoprazole (PROTONIX) 40 mg tablet, Take 1 tablet (40 mg total) by mouth 2 (two) times a day, Disp: 60 tablet, Rfl: 0    Current Allergies     Allergies as of 01/22/2024   • (No Known Allergies)            The following portions of the patient's history were reviewed and updated as appropriate: allergies, current medications, past family history, past medical history, past social history, past surgical history and problem list.     Past Medical History:   Diagnosis Date   • Disease of thyroid gland    • GERD (gastroesophageal reflux disease)    • Hyperlipidemia    • Hypertension        Past Surgical History:   Procedure Laterality Date   • CARPAL TUNNEL RELEASE     • TONSILLECTOMY         Family History   Problem Relation Age of Onset   • Thyroid disease Mother    • Heart attack Father    • Heart attack Paternal Grandmother    • Heart disease Paternal Grandmother         Pacemaker   • Heart disease Paternal Grandfather          Medications have been verified.        Objective   /88   Pulse 77   Temp 98.1 °F (36.7 °C)   Resp 18   Ht 5' 10\" (1.778 m)   Wt 97.1 kg (214 lb)   SpO2 98%   BMI 30.71 kg/m²        Physical Exam     Physical Exam  Vitals and nursing note reviewed.   Constitutional:       General: He is not in acute distress.     Appearance: Normal appearance. He is normal weight. He is not ill-appearing, toxic-appearing or diaphoretic.   Cardiovascular:      Rate and Rhythm: Normal rate and regular rhythm.      Pulses: Normal pulses.      Heart sounds: Normal heart sounds. No murmur heard.     No friction rub. No gallop.   Pulmonary:      Effort: No respiratory distress.      Breath sounds: Normal breath sounds. No stridor. No wheezing, rhonchi or rales. "   Chest:      Chest wall: No tenderness.   Musculoskeletal:      Right lower leg: Swelling and tenderness (Right posterior calf tenderness) present. No deformity, lacerations or bony tenderness. 3+ Pitting Edema present.      Comments: Homans' sign negative.  +2 DP and PT pulses.  No overlying superficial veins.  Ambulating without difficulty   Neurological:      Mental Status: He is alert.

## 2024-01-22 NOTE — ED PROVIDER NOTES
History  Chief Complaint   Patient presents with    Leg Swelling     Pt had knee swelling a few days ago which he was icing and elevating. This morning he woke up and the knee swelling was gone but has some swelling and discomfort in his right calf. Pt denies any falls or trauma, Pt states he was skiing on Thursday which is what caused the knee swelling. Pt was seen at urgent care and sent over for further eval due to a history of Afib.     63 year old male with PMH HTN, HLD, GERD presenting for evaluation of right lower extremity swelling.  Pt reports last week he had some trouble with his right knee.  He reports he had pain and swelling along the anterior aspect of the knee.  He denies injury or trauma.  He notes he is physically active.  He reports he has been doing ice, elevation, NSAIDs and using his tens unit.  He reports the knee is feeling better.  He notes the swelling has gone down and the pain has improved.  He is now having noted swelling and tightness in his calf.  He notes h/o prior Achilles tendon surgery on the right lower leg.  He reports this leg has always been benedict than the other but now with swelling he feels his legs are more similar in size.  No personal h/o DVT or PE.  No recent surgery.  No recent immobilization.  He notes family history of blood clots in his father.  Was seen at urgent care and referred to ED for further evaluation.      History provided by:  Patient and medical records   used: No        Prior to Admission Medications   Prescriptions Last Dose Informant Patient Reported? Taking?   atorvastatin (LIPITOR) 20 mg tablet   Yes No   Sig: Take 20 mg by mouth daily   candesartan (ATACAND) 16 mg tablet   Yes No   Sig: Take 16 mg by mouth daily   ezetimibe (ZETIA) 10 mg tablet   Yes No   Sig: Take 10 mg by mouth daily   levothyroxine 100 mcg tablet   Yes No   Sig: Take 100 mcg by mouth daily   pantoprazole (PROTONIX) 40 mg tablet   No No   Sig: Take 1 tablet  (40 mg total) by mouth 2 (two) times a day      Facility-Administered Medications: None       Past Medical History:   Diagnosis Date    Disease of thyroid gland     GERD (gastroesophageal reflux disease)     Hyperlipidemia     Hypertension        Past Surgical History:   Procedure Laterality Date    CARPAL TUNNEL RELEASE      TONSILLECTOMY         Family History   Problem Relation Age of Onset    Thyroid disease Mother     Heart attack Father     Heart attack Paternal Grandmother     Heart disease Paternal Grandmother         Pacemaker    Heart disease Paternal Grandfather      I have reviewed and agree with the history as documented.    E-Cigarette/Vaping    E-Cigarette Use Never User      E-Cigarette/Vaping Substances    Nicotine No     THC No     CBD No     Flavoring No     Other No     Unknown No      Social History     Tobacco Use    Smoking status: Never    Smokeless tobacco: Never    Tobacco comments:     per [employer]-Former Smoker   Vaping Use    Vaping status: Never Used   Substance Use Topics    Alcohol use: Yes     Comment: socially    Drug use: No     Comment: Denied use       Review of Systems   Constitutional: Negative.  Negative for chills, fatigue and fever.   HENT: Negative.     Eyes: Negative.    Respiratory: Negative.  Negative for cough, shortness of breath and wheezing.    Cardiovascular:  Positive for leg swelling. Negative for chest pain and palpitations.   Gastrointestinal: Negative.  Negative for abdominal pain, diarrhea, nausea and vomiting.   Genitourinary: Negative.    Musculoskeletal:  Positive for arthralgias. Negative for back pain and neck pain.   Skin: Negative.  Negative for rash.   Neurological: Negative.  Negative for dizziness, light-headedness, numbness and headaches.   Psychiatric/Behavioral: Negative.     All other systems reviewed and are negative.      Physical Exam  Physical Exam  Vitals and nursing note reviewed.   Constitutional:       General: He is awake. He is not in  acute distress.     Appearance: Normal appearance. He is well-developed. He is not toxic-appearing or diaphoretic.   HENT:      Head: Normocephalic and atraumatic.      Right Ear: Hearing and external ear normal.      Left Ear: Hearing and external ear normal.      Mouth/Throat:      Mouth: Mucous membranes are moist.      Pharynx: Oropharynx is clear.   Eyes:      General: Lids are normal. No scleral icterus.     Conjunctiva/sclera: Conjunctivae normal.   Neck:      Trachea: Trachea and phonation normal.   Cardiovascular:      Rate and Rhythm: Normal rate and regular rhythm.      Pulses: Normal pulses.           Dorsalis pedis pulses are 2+ on the right side and 2+ on the left side.        Posterior tibial pulses are 2+ on the right side and 2+ on the left side.      Heart sounds: Normal heart sounds, S1 normal and S2 normal.   Pulmonary:      Effort: Pulmonary effort is normal. No tachypnea or respiratory distress.      Breath sounds: Normal breath sounds. No wheezing, rhonchi or rales.   Musculoskeletal:      Right knee: No swelling, deformity, erythema, ecchymosis or crepitus. Normal range of motion. No tenderness. Normal pulse.      Right lower leg: Edema present.      Right ankle: No deformity or ecchymosis. No tenderness. Normal range of motion.        Legs:    Skin:     General: Skin is warm and dry.      Capillary Refill: Capillary refill takes less than 2 seconds.      Findings: No bruising, erythema, rash or wound.      Nails: There is no clubbing.   Neurological:      General: No focal deficit present.      Mental Status: He is alert and oriented to person, place, and time.      GCS: GCS eye subscore is 4. GCS verbal subscore is 5. GCS motor subscore is 6.      Sensory: Sensation is intact.      Motor: Motor function is intact.      Gait: Gait normal.   Psychiatric:         Mood and Affect: Mood normal.         Speech: Speech normal.         Behavior: Behavior normal. Behavior is cooperative.          Vital Signs  ED Triage Vitals [01/22/24 1159]   Temperature Pulse Respirations Blood Pressure SpO2   97.9 °F (36.6 °C) 78 18 165/98 96 %      Temp Source Heart Rate Source Patient Position - Orthostatic VS BP Location FiO2 (%)   Temporal Monitor Sitting Right arm --      Pain Score       --           Vitals:    01/22/24 1159 01/22/24 1230   BP: 165/98 139/84   Pulse: 78 75   Patient Position - Orthostatic VS: Sitting Sitting         Visual Acuity      ED Medications  Medications - No data to display    Diagnostic Studies  Results Reviewed       None                   VAS lower limb venous duplex study, unilateral/limited    (Results Pending)              Procedures  Procedures         ED Course  ED Course as of 01/22/24 1447   Mon Jan 22, 2024   1315 Discussed with Jose vascular tech, negative for DVT in RLE.  Also no noted baker's cyst or appreciated abnormality.                               SBIRT 22yo+      Flowsheet Row Most Recent Value   Initial Alcohol Screen: US AUDIT-C     1. How often do you have a drink containing alcohol? 1 Filed at: 01/22/2024 1201   2. How many drinks containing alcohol do you have on a typical day you are drinking?  1 Filed at: 01/22/2024 1201   3a. Male UNDER 65: How often do you have five or more drinks on one occasion? 0 Filed at: 01/22/2024 1201   Audit-C Score 2 Filed at: 01/22/2024 1201   ESTEVAN: How many times in the past year have you...    Used an illegal drug or used a prescription medication for non-medical reasons? Never Filed at: 01/22/2024 1201                      Medical Decision Making  64 yo male presenting for evaluation of right lower extremity swelling.  No history of trauma.  Had knee pain and swelling in the knee last week but this has improved.  On exam of knee he has full ROM of the knee.  No effusion.  No signs of joint infection.  No bony tenderness.  There is calf pain tenderness and some swelling.  Will obtain venous duplex to evaluate for  DVT.    Venous duplex obtained which was negative for DVT.  Earlier knee symptoms sounds like he may have had a prepatellar bursitis which appears to have resolved.    Reviewed symptomatic management.  OTC meds reviewed.  Anticipatory guidance.  Advised recheck with PCP or return to ER as needed.  Strict return precautions outlined.  Patient voiced understanding and had no further questions.    Please refer to above ER course for further details/discussion.      Problems Addressed:  Pain and swelling of right lower leg: acute illness or injury    Amount and/or Complexity of Data Reviewed  External Data Reviewed: notes.  Radiology: ordered. Decision-making details documented in ED Course.    Risk  OTC drugs.             Disposition  Final diagnoses:   Pain and swelling of right lower leg     Time reflects when diagnosis was documented in both MDM as applicable and the Disposition within this note       Time User Action Codes Description Comment    1/22/2024  1:20 PM Azul Guerrier Add [M79.661,  M79.89] Pain and swelling of right lower leg           ED Disposition       ED Disposition   Discharge    Condition   Stable    Date/Time   Mon Jan 22, 2024 1320    Comment   Andriy Castillo discharge to home/self care.                   Follow-up Information       Follow up With Specialties Details Why Contact Info Additional Information    Leo Morales,  General Practice Schedule an appointment as soon as possible for a visit   [address] [place] [id number]  [phone]       [facility] Emergency Department Emergency Medicine  As needed [address] [facility] [address]  [phone] [facility] Emergency Department, [address], [place], [address]            Discharge Medication List as of 1/22/2024  1:21 PM        CONTINUE these medications which have NOT CHANGED    Details   atorvastatin (LIPITOR) 20 mg tablet Take 20 mg by mouth  daily, Historical Med      candesartan (ATACAND) 16 mg tablet Take 16 mg by mouth daily, Historical Med      ezetimibe (ZETIA) 10 mg tablet Take 10 mg by mouth daily, Historical Med      levothyroxine 100 mcg tablet Take 100 mcg by mouth daily, Historical Med      pantoprazole (PROTONIX) 40 mg tablet Take 1 tablet (40 mg total) by mouth 2 (two) times a day, Starting Wed 3/30/2022, Normal             No discharge procedures on file.    PDMP Review       None            ED Provider  Electronically Signed by             Azul Guerrier PA-C  01/22/24 2888

## 2024-01-22 NOTE — DISCHARGE INSTRUCTIONS
Rest, ice, compression, elevation.  Continue OTC NSAIDs and tylenol for pain relief.  Follow up with PCP or return to ER as needed.

## 2024-02-18 ENCOUNTER — OFFICE VISIT (OUTPATIENT)
Dept: URGENT CARE | Facility: MEDICAL CENTER | Age: 64
End: 2024-02-18
Payer: COMMERCIAL

## 2024-02-18 VITALS
HEART RATE: 71 BPM | BODY MASS INDEX: 30.78 KG/M2 | SYSTOLIC BLOOD PRESSURE: 132 MMHG | RESPIRATION RATE: 18 BRPM | OXYGEN SATURATION: 96 % | TEMPERATURE: 97.3 F | HEIGHT: 70 IN | DIASTOLIC BLOOD PRESSURE: 80 MMHG | WEIGHT: 215 LBS

## 2024-02-18 DIAGNOSIS — B02.9 HERPES ZOSTER WITHOUT COMPLICATION: Primary | ICD-10-CM

## 2024-02-18 PROCEDURE — 99213 OFFICE O/P EST LOW 20 MIN: CPT | Performed by: STUDENT IN AN ORGANIZED HEALTH CARE EDUCATION/TRAINING PROGRAM

## 2024-02-18 RX ORDER — CAPSAICIN 0.75 MG/G
CREAM TOPICAL 3 TIMES DAILY PRN
Qty: 120 G | Refills: 0 | Status: SHIPPED | OUTPATIENT
Start: 2024-02-18

## 2024-02-18 RX ORDER — VALACYCLOVIR HYDROCHLORIDE 1 G/1
1000 TABLET, FILM COATED ORAL 3 TIMES DAILY
Qty: 21 TABLET | Refills: 0 | Status: SHIPPED | OUTPATIENT
Start: 2024-02-18 | End: 2024-02-25

## 2024-02-18 NOTE — PROGRESS NOTES
[facility]        NAME: Andriy Castillo is a 63 y.o. male  : 1960    MRN: [id number]    Assessment and Plan   Herpes zoster without complication [B02.9]  1. Herpes zoster without complication  valACYclovir (VALTREX) 1,000 mg tablet    capsicum (ZOSTRIX) 0.075 % topical cream        Vesicular painful rash in dermatomal pattern consistent with herpes zoster. Will tx with valtrex and capsaicin for pain management. Discussed symptomatic and supportive measures along with measures to prevent reinfection.  Discussed risk of postherpetic neuralgia and importance of following up with PCP for further evaluation and management if this were to occur.    Possible involvement of left auditory canal, though no otic and vestibular symptoms present. Discussed risk of malachi hunt syndrome with pt and to monitor for symptoms. Also discussed risk of optic never involvement if rash extends to involve the nose.    Patient Instructions     See wrap up for details  Follow up with PCP in 3-5 days.  Proceed to  ER if symptoms worsen.    Chief Complaint     Chief Complaint   Patient presents with    Rash     Painful rash noted to left side of the head. Started on Thursday          History of Present Illness     HPI    patient presented to the office today with onset of rash on the left side of his head 3 to 4 days ago.  Denies any prior history of shingles.  Reports sharp stabbing pain which is intermittent and also reproducible with only light touch.  Denies fever, chills, nausea, vomiting.  States the rash is also present on the top of his left ear.  Denies any ear pain, hearing loss, dizziness, vertigo, tinnitus.  Patient has not received the Shingrix vaccine    Review of Systems   Review of Systems   Constitutional:  Negative for chills and fever.   HENT:  Negative for ear pain, hearing loss, sore throat and tinnitus.    Eyes:  Negative for pain and visual disturbance.   Respiratory:  Negative for cough and  shortness of breath.    Cardiovascular:  Negative for chest pain and palpitations.   Gastrointestinal:  Negative for abdominal pain and vomiting.   Genitourinary:  Negative for dysuria and hematuria.   Musculoskeletal:  Negative for arthralgias and back pain.   Skin:  Positive for rash. Negative for color change.   Neurological:  Negative for dizziness, seizures and syncope.   All other systems reviewed and are negative.    Current Medications       Current Outpatient Medications:     atorvastatin (LIPITOR) 20 mg tablet, Take 20 mg by mouth daily, Disp: , Rfl:     candesartan (ATACAND) 16 mg tablet, Take 16 mg by mouth daily, Disp: , Rfl:     capsicum (ZOSTRIX) 0.075 % topical cream, Apply topically 3 (three) times a day as needed (rash pain), Disp: 120 g, Rfl: 0    ezetimibe (ZETIA) 10 mg tablet, Take 10 mg by mouth daily, Disp: , Rfl:     levothyroxine 100 mcg tablet, Take 100 mcg by mouth daily, Disp: , Rfl:     pantoprazole (PROTONIX) 40 mg tablet, Take 1 tablet (40 mg total) by mouth 2 (two) times a day, Disp: 60 tablet, Rfl: 0    valACYclovir (VALTREX) 1,000 mg tablet, Take 1 tablet (1,000 mg total) by mouth 3 (three) times a day for 7 days, Disp: 21 tablet, Rfl: 0    Current Allergies     Allergies as of 02/18/2024    (No Known Allergies)            The following portions of the patient's history were reviewed and updated as appropriate: allergies, current medications, past family history, past medical history, past social history, past surgical history and problem list.     Past Medical History:   Diagnosis Date    Disease of thyroid gland     GERD (gastroesophageal reflux disease)     Hyperlipidemia     Hypertension        Past Surgical History:   Procedure Laterality Date    CARPAL TUNNEL RELEASE      TONSILLECTOMY         Family History   Problem Relation Age of Onset    Thyroid disease Mother     Heart attack Father     Heart attack Paternal Grandmother     Heart disease Paternal Grandmother          "Pacemaker    Heart disease Paternal Grandfather          Medications have been verified.        Objective   /80   Pulse 71   Temp (!) 97.3 °F (36.3 °C)   Resp 18   Ht 5' 10\" (1.778 m)   Wt 97.5 kg (215 lb)   SpO2 96%   BMI 30.85 kg/m²        Physical Exam     Physical Exam  Constitutional:       Appearance: Normal appearance.   HENT:      Head: Normocephalic and atraumatic.        Comments: Rash noted in the area noted above the C2-C3 dermatomal pattern-vesicles on an erythematous base.  Also noted on the pinna of the ear along with a possible lesion noted in the auditory canal.     Right Ear: No swelling or tenderness.   Eyes:      General: No scleral icterus.  Cardiovascular:      Rate and Rhythm: Normal rate.   Pulmonary:      Effort: Pulmonary effort is normal. No respiratory distress.   Neurological:      General: No focal deficit present.      Mental Status: He is alert and oriented to person, place, and time.   Psychiatric:         Mood and Affect: Mood normal.         Behavior: Behavior normal.             "

## 2024-02-18 NOTE — PATIENT INSTRUCTIONS
Take antiviral as prescribed   Keep affected area covered to avoid transmission   Take Ibuprofen and use lidocaine patches over the counter  Cool compresses over area  Talk to your PCP about varicella zoster vaccine

## 2024-02-24 ENCOUNTER — OFFICE VISIT (OUTPATIENT)
Dept: URGENT CARE | Facility: MEDICAL CENTER | Age: 64
End: 2024-02-24
Payer: COMMERCIAL

## 2024-02-24 VITALS
HEIGHT: 70 IN | OXYGEN SATURATION: 97 % | WEIGHT: 208 LBS | RESPIRATION RATE: 18 BRPM | HEART RATE: 90 BPM | BODY MASS INDEX: 29.78 KG/M2 | TEMPERATURE: 98.8 F

## 2024-02-24 DIAGNOSIS — J02.9 ACUTE PHARYNGITIS, UNSPECIFIED ETIOLOGY: Primary | ICD-10-CM

## 2024-02-24 LAB — S PYO AG THROAT QL: NEGATIVE

## 2024-02-24 PROCEDURE — 87070 CULTURE OTHR SPECIMN AEROBIC: CPT

## 2024-02-24 PROCEDURE — 87880 STREP A ASSAY W/OPTIC: CPT

## 2024-02-24 PROCEDURE — 99212 OFFICE O/P EST SF 10 MIN: CPT

## 2024-02-24 NOTE — PROGRESS NOTES
[facility]        NAME: Andriy Castillo is a 63 y.o. male  : 1960    MRN: [id number]  DATE: 2024  TIME: 12:21 PM    Assessment and Plan   Acute pharyngitis, unspecified etiology [J02.9]  1. Acute pharyngitis, unspecified etiology  POCT rapid ANTIGEN strepA    Throat culture    Throat culture            Patient Instructions       Follow up with PCP in 3-5 days.  Proceed to  ER if symptoms worsen.    Chief Complaint     Chief Complaint   Patient presents with   • Sore Throat         History of Present Illness       Patient here with sore throat and fevers since Thursday. Also has body aches. Taking Advil with some relief with the fever and body aches. Patient does have sinus congestion. Reports throat feels like he is swallowing glass. Has been taking valtrex since last week due to having shingles.  He did take x2 home COVID tests which were negative.         Review of Systems   Review of Systems   Constitutional:  Positive for fatigue. Negative for chills and fever.   HENT:  Positive for congestion, postnasal drip and sore throat. Negative for ear pain, rhinorrhea, sinus pressure, sinus pain and trouble swallowing.    Respiratory:  Negative for cough and shortness of breath.    Cardiovascular:  Negative for chest pain and palpitations.   Gastrointestinal:  Negative for abdominal pain, constipation, diarrhea, nausea and vomiting.   Musculoskeletal:  Positive for myalgias. Negative for arthralgias and back pain.   Skin:  Negative for color change and rash.   All other systems reviewed and are negative.        Current Medications       Current Outpatient Medications:   •  atorvastatin (LIPITOR) 20 mg tablet, Take 20 mg by mouth daily, Disp: , Rfl:   •  candesartan (ATACAND) 16 mg tablet, Take 16 mg by mouth daily, Disp: , Rfl:   •  capsicum (ZOSTRIX) 0.075 % topical cream, Apply topically 3 (three) times a day as needed (rash pain), Disp: 120 g, Rfl: 0  •  ezetimibe (ZETIA) 10 mg  "tablet, Take 10 mg by mouth daily, Disp: , Rfl:   •  levothyroxine 100 mcg tablet, Take 100 mcg by mouth daily, Disp: , Rfl:   •  pantoprazole (PROTONIX) 40 mg tablet, Take 1 tablet (40 mg total) by mouth 2 (two) times a day, Disp: 60 tablet, Rfl: 0  •  valACYclovir (VALTREX) 1,000 mg tablet, Take 1 tablet (1,000 mg total) by mouth 3 (three) times a day for 7 days, Disp: 21 tablet, Rfl: 0    Current Allergies     Allergies as of 02/24/2024   • (No Known Allergies)            The following portions of the patient's history were reviewed and updated as appropriate: allergies, current medications, past family history, past medical history, past social history, past surgical history and problem list.     Past Medical History:   Diagnosis Date   • Disease of thyroid gland    • GERD (gastroesophageal reflux disease)    • Hyperlipidemia    • Hypertension        Past Surgical History:   Procedure Laterality Date   • CARPAL TUNNEL RELEASE     • TONSILLECTOMY         Family History   Problem Relation Age of Onset   • Thyroid disease Mother    • Heart attack Father    • Heart attack Paternal Grandmother    • Heart disease Paternal Grandmother         Pacemaker   • Heart disease Paternal Grandfather          Medications have been verified.        Objective   Pulse 90   Temp 98.8 °F (37.1 °C) (Temporal)   Resp 18   Ht 5' 10\" (1.778 m)   Wt 94.3 kg (208 lb)   SpO2 97%   BMI 29.84 kg/m²        Physical Exam     Physical Exam  Vitals and nursing note reviewed.   Constitutional:       General: He is not in acute distress.     Appearance: Normal appearance. He is normal weight. He is not ill-appearing.   HENT:      Head: Normocephalic and atraumatic.      Right Ear: Ear canal and external ear normal. A middle ear effusion is present.      Left Ear: Ear canal and external ear normal. A middle ear effusion is present.      Nose: Congestion and rhinorrhea present. Rhinorrhea is clear.      Mouth/Throat:      Lips: Pink.      " Mouth: Mucous membranes are moist.      Pharynx: Oropharynx is clear. Uvula midline. Posterior oropharyngeal erythema present. No pharyngeal swelling, oropharyngeal exudate or uvula swelling.      Tonsils: No tonsillar exudate or tonsillar abscesses. 1+ on the right. 1+ on the left.   Eyes:      Extraocular Movements: Extraocular movements intact.      Conjunctiva/sclera: Conjunctivae normal.      Pupils: Pupils are equal, round, and reactive to light.   Cardiovascular:      Rate and Rhythm: Normal rate and regular rhythm.      Pulses: Normal pulses.      Heart sounds: Normal heart sounds.   Pulmonary:      Effort: Pulmonary effort is normal.      Breath sounds: Normal breath sounds.   Abdominal:      General: Abdomen is flat. Bowel sounds are normal.      Palpations: Abdomen is soft.   Musculoskeletal:         General: Normal range of motion.      Cervical back: Full passive range of motion without pain, normal range of motion and neck supple.   Lymphadenopathy:      Cervical: Cervical adenopathy present.   Skin:     General: Skin is warm and dry.      Capillary Refill: Capillary refill takes less than 2 seconds.      Findings: No rash.   Neurological:      General: No focal deficit present.      Mental Status: He is alert and oriented to person, place, and time.   Psychiatric:         Mood and Affect: Mood normal.         Behavior: Behavior normal.

## 2024-02-24 NOTE — PATIENT INSTRUCTIONS
You may take over the counter Tylenol (Acetaminophen) and/or Motrin (Ibuprofen) as needed, as directed on packaging.   Be sure to get plenty of rest, and drinking fluids to remain hydrated.     Please follow up with your primary provider in the next several days. Should you have any worsening of symptoms, or lack of improvement please be re-evaluated. If needed for significant concerns, consider 911 or ER evaluation.     Over the counter decongestants can be used, only as directed on the box. However if you have any history of cardiac disease or high blood pressure these should be avoided, as we caution the use of these since they can make place strain on your heart and cause increase in blood pressure. It is recommended in lieu of decongestants to use cool mist vaporizer, saline nasal spray to relieve nasal congestion. It is also important to remain hydrated and drink plenty of fluids (avoiding caffeine and alcohol).   Mucinex is an expectorant medication which will help to relieve the chest congestion, it is important to drink lots of fluids and keep hydrated.

## 2024-02-27 LAB — BACTERIA THROAT CULT: NORMAL

## 2024-04-01 ENCOUNTER — OFFICE VISIT (OUTPATIENT)
Dept: URGENT CARE | Facility: MEDICAL CENTER | Age: 64
End: 2024-04-01
Payer: COMMERCIAL

## 2024-04-01 VITALS
DIASTOLIC BLOOD PRESSURE: 79 MMHG | OXYGEN SATURATION: 98 % | TEMPERATURE: 98.9 F | HEIGHT: 70 IN | RESPIRATION RATE: 18 BRPM | BODY MASS INDEX: 30.78 KG/M2 | SYSTOLIC BLOOD PRESSURE: 148 MMHG | HEART RATE: 80 BPM | WEIGHT: 215 LBS

## 2024-04-01 DIAGNOSIS — S91.332A PUNCTURE WOUND OF LEFT FOOT, INITIAL ENCOUNTER: Primary | ICD-10-CM

## 2024-04-01 PROCEDURE — 99213 OFFICE O/P EST LOW 20 MIN: CPT | Performed by: PHYSICIAN ASSISTANT

## 2024-04-01 NOTE — PROGRESS NOTES
[facility]    NAME: Andriy Castillo is a 63 y.o. male  : 1960    MRN: [id number]  DATE: 2024  TIME: 10:09 AM    Assessment and Plan   Puncture wound of left foot, initial encounter [S91.332A]  1. Puncture wound of left foot, initial encounter            Patient Instructions     Patient Instructions   Last tetanus 21-is up to date.    Return if you have increased pain or signs of infection.    Chief Complaint     Chief Complaint   Patient presents with    Puncture Wound     Stepped on a board and a nail went through his shoe and punctured his left foot        History of Present Illness   63 year old male here with complaint of puncture wound left foot.  Stepped on a rasta nail that went through is sneaker a couple days ago.  Was not sure if his tetanus was up to date.  No redness but area is slightly sore.  He is able to walk on that foot with no difficulty.          Review of Systems   Review of Systems   Constitutional:  Negative for chills and fever.   Respiratory:  Negative for cough and shortness of breath.    Cardiovascular:  Negative for chest pain.   Skin:  Positive for wound.       Current Medications     Current Outpatient Medications:     atorvastatin (LIPITOR) 20 mg tablet, Take 20 mg by mouth daily, Disp: , Rfl:     candesartan (ATACAND) 16 mg tablet, Take 16 mg by mouth daily, Disp: , Rfl:     capsicum (ZOSTRIX) 0.075 % topical cream, Apply topically 3 (three) times a day as needed (rash pain), Disp: 120 g, Rfl: 0    ezetimibe (ZETIA) 10 mg tablet, Take 10 mg by mouth daily, Disp: , Rfl:     levothyroxine 100 mcg tablet, Take 100 mcg by mouth daily, Disp: , Rfl:     pantoprazole (PROTONIX) 40 mg tablet, Take 1 tablet (40 mg total) by mouth 2 (two) times a day, Disp: 60 tablet, Rfl: 0    valACYclovir (VALTREX) 1,000 mg tablet, Take 1 tablet (1,000 mg total) by mouth 3 (three) times a day for 7 days, Disp: 21 tablet, Rfl: 0    Current Allergies     Allergies as of  04/01/2024    (No Known Allergies)          The following portions of the patient's history were reviewed and updated as appropriate: allergies, current medications, past family history, past medical history, past social history, past surgical history and problem list.   Past Medical History:   Diagnosis Date    Disease of thyroid gland     GERD (gastroesophageal reflux disease)     Hyperlipidemia     Hypertension      Past Surgical History:   Procedure Laterality Date    CARPAL TUNNEL RELEASE      TONSILLECTOMY       Family History   Problem Relation Age of Onset    Thyroid disease Mother     Heart attack Father     Heart attack Paternal Grandmother     Heart disease Paternal Grandmother         Pacemaker    Heart disease Paternal Grandfather      Social History     Socioeconomic History    Marital status: /Civil Union     Spouse name: Not on file    Number of children: Not on file    Years of education: Not on file    Highest education level: Not on file   Occupational History    Not on file   Tobacco Use    Smoking status: Never    Smokeless tobacco: Never    Tobacco comments:     per [employer]-Former Smoker   Vaping Use    Vaping status: Never Used   Substance and Sexual Activity    Alcohol use: Yes     Comment: socially    Drug use: No     Comment: Denied use    Sexual activity: Not on file   Other Topics Concern    Not on file   Social History Narrative    Not on file     Social Determinants of Health     Financial Resource Strain: Not on file   Food Insecurity: No Food Insecurity (3/28/2022)    Hunger Vital Sign     Worried About Running Out of Food in the Last Year: Never true     Ran Out of Food in the Last Year: Never true   Transportation Needs: No Transportation Needs (3/28/2022)    PRAPARE - Transportation     Lack of Transportation (Medical): No     Lack of Transportation (Non-Medical): No   Physical Activity: Not on file   Stress: Not on file   Social Connections: Not on file   Intimate  "Partner Violence: Not on file   Housing Stability: Unknown (3/28/2022)    Housing Stability Vital Sign     Unable to Pay for Housing in the Last Year: No     Number of Places Lived in the Last Year: Not on file     Unstable Housing in the Last Year: No     Medications have been verified.    Objective   /79   Pulse 80   Temp 98.9 °F (37.2 °C)   Resp 18   Ht 5' 10\" (1.778 m)   Wt 97.5 kg (215 lb)   SpO2 98%   BMI 30.85 kg/m²      Physical Exam   Physical Exam  Vitals and nursing note reviewed.   Constitutional:       Appearance: Normal appearance.   Cardiovascular:      Rate and Rhythm: Normal rate and regular rhythm.      Pulses: Normal pulses.      Heart sounds: Normal heart sounds.   Pulmonary:      Effort: Pulmonary effort is normal.      Breath sounds: Normal breath sounds.   Musculoskeletal:      Cervical back: Normal range of motion.        Feet:    Feet:      Comments: Puncture wound noted.  No signs of infection.  Neurological:      Mental Status: He is alert.                     "

## 2024-05-16 ENCOUNTER — APPOINTMENT (RX ONLY)
Dept: URBAN - NONMETROPOLITAN AREA CLINIC 4 | Facility: CLINIC | Age: 64
Setting detail: DERMATOLOGY
End: 2024-05-16

## 2024-05-16 DIAGNOSIS — L82.0 INFLAMED SEBORRHEIC KERATOSIS: ICD-10-CM

## 2024-05-16 PROCEDURE — 17110 DESTRUCTION B9 LES UP TO 14: CPT

## 2024-05-16 PROCEDURE — ? LIQUID NITROGEN

## 2024-05-16 PROCEDURE — ? COUNSELING

## 2024-05-16 ASSESSMENT — LOCATION DETAILED DESCRIPTION DERM: LOCATION DETAILED: RIGHT CENTRAL FRONTAL SCALP

## 2024-05-16 ASSESSMENT — LOCATION SIMPLE DESCRIPTION DERM: LOCATION SIMPLE: RIGHT SCALP

## 2024-05-16 ASSESSMENT — LOCATION ZONE DERM: LOCATION ZONE: SCALP

## 2024-05-16 NOTE — PROCEDURE: LIQUID NITROGEN
Medical Necessity Information: It is in your best interest to select a reason for this procedure from the list below. All of these items fulfill various CMS LCD requirements except the new and changing color options.
Post-Care Instructions: I reviewed with the patient in detail post-care instructions. Patient is to wear sunprotection, and avoid picking at any of the treated lesions. Pt may apply Vaseline to crusted or scabbing areas.
Duration Of Freeze Thaw-Cycle (Seconds): 3
Detail Level: Detailed
Show Applicator Variable?: Yes
Add 52 Modifier (Optional): no
Spray Paint Text: The liquid nitrogen was applied to the skin utilizing a spray paint frosting technique.
Application Tool (Optional): Cry-AC
Consent: The patient's consent was obtained including but not limited to risks of crusting, scabbing, blistering, scarring, darker or lighter pigmentary change, recurrence, incomplete removal and infection.
Medical Necessity Clause: This procedure was medically necessary because the lesions that were treated were:
Number Of Freeze-Thaw Cycles: 3 freeze-thaw cycles

## 2024-05-28 ENCOUNTER — OFFICE VISIT (OUTPATIENT)
Dept: URGENT CARE | Facility: MEDICAL CENTER | Age: 64
End: 2024-05-28
Payer: COMMERCIAL

## 2024-05-28 VITALS
TEMPERATURE: 97.9 F | SYSTOLIC BLOOD PRESSURE: 118 MMHG | DIASTOLIC BLOOD PRESSURE: 76 MMHG | OXYGEN SATURATION: 94 % | HEART RATE: 64 BPM | RESPIRATION RATE: 20 BRPM | WEIGHT: 215 LBS | BODY MASS INDEX: 30.85 KG/M2

## 2024-05-28 DIAGNOSIS — J02.9 SORE THROAT: ICD-10-CM

## 2024-05-28 DIAGNOSIS — J02.9 ACUTE PHARYNGITIS, UNSPECIFIED ETIOLOGY: Primary | ICD-10-CM

## 2024-05-28 LAB — S PYO AG THROAT QL: NEGATIVE

## 2024-05-28 PROCEDURE — S9083 URGENT CARE CENTER GLOBAL: HCPCS | Performed by: PHYSICIAN ASSISTANT

## 2024-05-28 PROCEDURE — 87880 STREP A ASSAY W/OPTIC: CPT | Performed by: PHYSICIAN ASSISTANT

## 2024-05-28 PROCEDURE — G0381 LEV 2 HOSP TYPE B ED VISIT: HCPCS | Performed by: PHYSICIAN ASSISTANT

## 2024-05-28 RX ORDER — NEBIVOLOL 5 MG/1
5 TABLET ORAL DAILY
COMMUNITY

## 2024-05-28 RX ORDER — AMOXICILLIN 500 MG/1
500 CAPSULE ORAL EVERY 8 HOURS SCHEDULED
Qty: 30 CAPSULE | Refills: 0 | Status: SHIPPED | OUTPATIENT
Start: 2024-05-28 | End: 2024-06-07

## 2024-05-28 NOTE — PATIENT INSTRUCTIONS
Start antibiotic as prescribed  Change toothbrush on day 3 of antibiotic  Tylenol or Ibuprofen as needed for fever or pain  Drink plenty of fluids  Tea with honey  Gargle with salt water  If symptoms fail to improve follow up with PCP  If symptoms worsen have yourself rechecked

## 2024-06-12 ENCOUNTER — APPOINTMENT (OUTPATIENT)
Dept: LAB | Facility: MEDICAL CENTER | Age: 64
End: 2024-06-12
Payer: COMMERCIAL

## 2024-06-12 DIAGNOSIS — E03.9 HYPOTHYROIDISM, UNSPECIFIED TYPE: ICD-10-CM

## 2024-06-12 LAB
T4 FREE SERPL-MCNC: 0.71 NG/DL (ref 0.61–1.12)
TSH SERPL DL<=0.05 MIU/L-ACNC: 1.55 UIU/ML (ref 0.45–4.5)

## 2024-06-12 PROCEDURE — 84443 ASSAY THYROID STIM HORMONE: CPT

## 2024-06-12 PROCEDURE — 36415 COLL VENOUS BLD VENIPUNCTURE: CPT

## 2024-06-12 PROCEDURE — 84439 ASSAY OF FREE THYROXINE: CPT

## 2024-06-17 DIAGNOSIS — Z00.6 ENCOUNTER FOR EXAMINATION FOR NORMAL COMPARISON OR CONTROL IN CLINICAL RESEARCH PROGRAM: ICD-10-CM

## 2024-06-18 ENCOUNTER — APPOINTMENT (OUTPATIENT)
Dept: LAB | Facility: MEDICAL CENTER | Age: 64
End: 2024-06-18
Payer: COMMERCIAL

## 2024-06-18 DIAGNOSIS — E78.00 HYPERCHOLESTEROLEMIA: Primary | ICD-10-CM

## 2024-06-18 DIAGNOSIS — Z00.6 ENCOUNTER FOR EXAMINATION FOR NORMAL COMPARISON OR CONTROL IN CLINICAL RESEARCH PROGRAM: ICD-10-CM

## 2024-06-18 PROCEDURE — 82172 ASSAY OF APOLIPOPROTEIN: CPT

## 2024-06-18 PROCEDURE — 36415 COLL VENOUS BLD VENIPUNCTURE: CPT

## 2024-06-19 LAB — APO B SERPL-MCNC: 75 MG/DL

## 2024-07-08 LAB
APOB+LDLR+PCSK9 GENE MUT ANL BLD/T: NOT DETECTED
BRCA1+BRCA2 DEL+DUP + FULL MUT ANL BLD/T: NOT DETECTED
MLH1+MSH2+MSH6+PMS2 GN DEL+DUP+FUL M: NOT DETECTED

## 2024-10-31 ENCOUNTER — APPOINTMENT (EMERGENCY)
Dept: RADIOLOGY | Facility: HOSPITAL | Age: 64
End: 2024-10-31
Payer: COMMERCIAL

## 2024-10-31 ENCOUNTER — OFFICE VISIT (OUTPATIENT)
Dept: URGENT CARE | Facility: MEDICAL CENTER | Age: 64
End: 2024-10-31
Payer: COMMERCIAL

## 2024-10-31 ENCOUNTER — ANESTHESIA EVENT (INPATIENT)
Dept: PERIOP | Facility: HOSPITAL | Age: 64
End: 2024-10-31
Payer: COMMERCIAL

## 2024-10-31 ENCOUNTER — APPOINTMENT (EMERGENCY)
Dept: CT IMAGING | Facility: HOSPITAL | Age: 64
End: 2024-10-31
Payer: COMMERCIAL

## 2024-10-31 ENCOUNTER — ANESTHESIA (INPATIENT)
Dept: PERIOP | Facility: HOSPITAL | Age: 64
End: 2024-10-31
Payer: COMMERCIAL

## 2024-10-31 ENCOUNTER — HOSPITAL ENCOUNTER (OUTPATIENT)
Facility: HOSPITAL | Age: 64
Setting detail: OBSERVATION
End: 2024-11-01
Attending: EMERGENCY MEDICINE | Admitting: INTERNAL MEDICINE
Payer: COMMERCIAL

## 2024-10-31 VITALS
HEIGHT: 70 IN | BODY MASS INDEX: 31.78 KG/M2 | WEIGHT: 222 LBS | SYSTOLIC BLOOD PRESSURE: 138 MMHG | RESPIRATION RATE: 18 BRPM | OXYGEN SATURATION: 96 % | HEART RATE: 77 BPM | DIASTOLIC BLOOD PRESSURE: 90 MMHG | TEMPERATURE: 98 F

## 2024-10-31 DIAGNOSIS — N20.0 NEPHROLITHIASIS: Primary | ICD-10-CM

## 2024-10-31 DIAGNOSIS — R10.11 RIGHT UPPER QUADRANT ABDOMINAL PAIN: Primary | ICD-10-CM

## 2024-10-31 DIAGNOSIS — D58.2 ELEVATED HEMOGLOBIN (HCC): ICD-10-CM

## 2024-10-31 DIAGNOSIS — N20.1 URETEROLITHIASIS: ICD-10-CM

## 2024-10-31 DIAGNOSIS — N17.9 AKI (ACUTE KIDNEY INJURY) (HCC): ICD-10-CM

## 2024-10-31 DIAGNOSIS — N13.2 HYDRONEPHROSIS WITH OBSTRUCTING CALCULUS: ICD-10-CM

## 2024-10-31 PROBLEM — I48.0 PAROXYSMAL ATRIAL FIBRILLATION (HCC): Status: ACTIVE | Noted: 2023-06-26

## 2024-10-31 PROBLEM — K56.600 PARTIAL SMALL BOWEL OBSTRUCTION (HCC): Status: RESOLVED | Noted: 2022-03-27 | Resolved: 2024-10-31

## 2024-10-31 PROBLEM — Z02.89 ENCOUNTER FOR FEDERAL AVIATION ADMINISTRATION (FAA) EXAMINATION: Status: RESOLVED | Noted: 2019-09-05 | Resolved: 2024-10-31

## 2024-10-31 PROBLEM — K92.1 BLACK STOOL: Status: RESOLVED | Noted: 2022-03-29 | Resolved: 2024-10-31

## 2024-10-31 LAB
ALBUMIN SERPL BCG-MCNC: 4.5 G/DL (ref 3.5–5)
ALP SERPL-CCNC: 69 U/L (ref 34–104)
ALT SERPL W P-5'-P-CCNC: 20 U/L (ref 7–52)
ANION GAP SERPL CALCULATED.3IONS-SCNC: 8 MMOL/L (ref 4–13)
AST SERPL W P-5'-P-CCNC: 22 U/L (ref 13–39)
BACTERIA UR QL AUTO: ABNORMAL /HPF
BASOPHILS # BLD AUTO: 0.09 THOUSANDS/ΜL (ref 0–0.1)
BASOPHILS NFR BLD AUTO: 1 % (ref 0–1)
BILIRUB SERPL-MCNC: 0.94 MG/DL (ref 0.2–1)
BILIRUB UR QL STRIP: NEGATIVE
BUN SERPL-MCNC: 13 MG/DL (ref 5–25)
CALCIUM SERPL-MCNC: 9.5 MG/DL (ref 8.4–10.2)
CARDIAC TROPONIN I PNL SERPL HS: 18 NG/L
CHLORIDE SERPL-SCNC: 101 MMOL/L (ref 96–108)
CLARITY UR: ABNORMAL
CO2 SERPL-SCNC: 27 MMOL/L (ref 21–32)
COLOR UR: YELLOW
CREAT SERPL-MCNC: 1.33 MG/DL (ref 0.6–1.3)
EOSINOPHIL # BLD AUTO: 0.05 THOUSAND/ΜL (ref 0–0.61)
EOSINOPHIL NFR BLD AUTO: 0 % (ref 0–6)
ERYTHROCYTE [DISTWIDTH] IN BLOOD BY AUTOMATED COUNT: 13.3 % (ref 11.6–15.1)
FLUAV RNA RESP QL NAA+PROBE: NEGATIVE
FLUBV RNA RESP QL NAA+PROBE: NEGATIVE
GFR SERPL CREATININE-BSD FRML MDRD: 55 ML/MIN/1.73SQ M
GLUCOSE SERPL-MCNC: 126 MG/DL (ref 65–140)
GLUCOSE UR STRIP-MCNC: NEGATIVE MG/DL
HCT VFR BLD AUTO: 57.2 % (ref 36.5–49.3)
HGB BLD-MCNC: 18.5 G/DL (ref 12–17)
HGB UR QL STRIP.AUTO: ABNORMAL
IMM GRANULOCYTES # BLD AUTO: 0.03 THOUSAND/UL (ref 0–0.2)
IMM GRANULOCYTES NFR BLD AUTO: 0 % (ref 0–2)
KETONES UR STRIP-MCNC: NEGATIVE MG/DL
LACTATE SERPL-SCNC: 1.3 MMOL/L (ref 0.5–2)
LEUKOCYTE ESTERASE UR QL STRIP: NEGATIVE
LIPASE SERPL-CCNC: 42 U/L (ref 11–82)
LYMPHOCYTES # BLD AUTO: 1.38 THOUSANDS/ΜL (ref 0.6–4.47)
LYMPHOCYTES NFR BLD AUTO: 10 % (ref 14–44)
MCH RBC QN AUTO: 28.8 PG (ref 26.8–34.3)
MCHC RBC AUTO-ENTMCNC: 32.3 G/DL (ref 31.4–37.4)
MCV RBC AUTO: 89 FL (ref 82–98)
MONOCYTES # BLD AUTO: 1.34 THOUSAND/ΜL (ref 0.17–1.22)
MONOCYTES NFR BLD AUTO: 10 % (ref 4–12)
NEUTROPHILS # BLD AUTO: 10.63 THOUSANDS/ΜL (ref 1.85–7.62)
NEUTS SEG NFR BLD AUTO: 79 % (ref 43–75)
NITRITE UR QL STRIP: NEGATIVE
NON-SQ EPI CELLS URNS QL MICRO: ABNORMAL /HPF
NRBC BLD AUTO-RTO: 0 /100 WBCS
PH UR STRIP.AUTO: 5.5 [PH]
PLATELET # BLD AUTO: 202 THOUSANDS/UL (ref 149–390)
PMV BLD AUTO: 10.3 FL (ref 8.9–12.7)
POTASSIUM SERPL-SCNC: 4.3 MMOL/L (ref 3.5–5.3)
PROCALCITONIN SERPL-MCNC: <0.05 NG/ML
PROT SERPL-MCNC: 7.6 G/DL (ref 6.4–8.4)
PROT UR STRIP-MCNC: ABNORMAL MG/DL
RBC # BLD AUTO: 6.43 MILLION/UL (ref 3.88–5.62)
RBC #/AREA URNS AUTO: ABNORMAL /HPF
RSV RNA RESP QL NAA+PROBE: NEGATIVE
SARS-COV-2 RNA RESP QL NAA+PROBE: NEGATIVE
SL AMB  POCT GLUCOSE, UA: ABNORMAL
SL AMB LEUKOCYTE ESTERASE,UA: ABNORMAL
SL AMB POCT BILIRUBIN,UA: ABNORMAL
SL AMB POCT BLOOD,UA: ABNORMAL
SL AMB POCT CLARITY,UA: ABNORMAL
SL AMB POCT COLOR,UA: ABNORMAL
SL AMB POCT KETONES,UA: ABNORMAL
SL AMB POCT NITRITE,UA: ABNORMAL
SL AMB POCT PH,UA: 6
SL AMB POCT SPECIFIC GRAVITY,UA: 1.02
SL AMB POCT URINE PROTEIN: ABNORMAL
SL AMB POCT UROBILINOGEN: 0.2
SODIUM SERPL-SCNC: 136 MMOL/L (ref 135–147)
SP GR UR STRIP.AUTO: 1.01 (ref 1–1.03)
UROBILINOGEN UR STRIP-ACNC: <2 MG/DL
WBC # BLD AUTO: 13.52 THOUSAND/UL (ref 4.31–10.16)
WBC #/AREA URNS AUTO: ABNORMAL /HPF

## 2024-10-31 PROCEDURE — 84484 ASSAY OF TROPONIN QUANT: CPT | Performed by: EMERGENCY MEDICINE

## 2024-10-31 PROCEDURE — S9083 URGENT CARE CENTER GLOBAL: HCPCS

## 2024-10-31 PROCEDURE — 99223 1ST HOSP IP/OBS HIGH 75: CPT | Performed by: INTERNAL MEDICINE

## 2024-10-31 PROCEDURE — 87040 BLOOD CULTURE FOR BACTERIA: CPT | Performed by: INTERNAL MEDICINE

## 2024-10-31 PROCEDURE — 81001 URINALYSIS AUTO W/SCOPE: CPT | Performed by: EMERGENCY MEDICINE

## 2024-10-31 PROCEDURE — 0241U HB NFCT DS VIR RESP RNA 4 TRGT: CPT | Performed by: INTERNAL MEDICINE

## 2024-10-31 PROCEDURE — 83605 ASSAY OF LACTIC ACID: CPT | Performed by: EMERGENCY MEDICINE

## 2024-10-31 PROCEDURE — 87081 CULTURE SCREEN ONLY: CPT | Performed by: INTERNAL MEDICINE

## 2024-10-31 PROCEDURE — 96375 TX/PRO/DX INJ NEW DRUG ADDON: CPT

## 2024-10-31 PROCEDURE — 36415 COLL VENOUS BLD VENIPUNCTURE: CPT | Performed by: EMERGENCY MEDICINE

## 2024-10-31 PROCEDURE — 93005 ELECTROCARDIOGRAM TRACING: CPT

## 2024-10-31 PROCEDURE — 99291 CRITICAL CARE FIRST HOUR: CPT | Performed by: EMERGENCY MEDICINE

## 2024-10-31 PROCEDURE — 74177 CT ABD & PELVIS W/CONTRAST: CPT

## 2024-10-31 PROCEDURE — 84145 PROCALCITONIN (PCT): CPT | Performed by: INTERNAL MEDICINE

## 2024-10-31 PROCEDURE — 80053 COMPREHEN METABOLIC PANEL: CPT | Performed by: EMERGENCY MEDICINE

## 2024-10-31 PROCEDURE — 83690 ASSAY OF LIPASE: CPT | Performed by: EMERGENCY MEDICINE

## 2024-10-31 PROCEDURE — 96361 HYDRATE IV INFUSION ADD-ON: CPT

## 2024-10-31 PROCEDURE — 87086 URINE CULTURE/COLONY COUNT: CPT

## 2024-10-31 PROCEDURE — 81002 URINALYSIS NONAUTO W/O SCOPE: CPT

## 2024-10-31 PROCEDURE — 74018 RADEX ABDOMEN 1 VIEW: CPT

## 2024-10-31 PROCEDURE — 96365 THER/PROPH/DIAG IV INF INIT: CPT

## 2024-10-31 PROCEDURE — 85025 COMPLETE CBC W/AUTO DIFF WBC: CPT | Performed by: EMERGENCY MEDICINE

## 2024-10-31 PROCEDURE — 96376 TX/PRO/DX INJ SAME DRUG ADON: CPT

## 2024-10-31 PROCEDURE — 99285 EMERGENCY DEPT VISIT HI MDM: CPT

## 2024-10-31 PROCEDURE — G0382 LEV 3 HOSP TYPE B ED VISIT: HCPCS

## 2024-10-31 RX ORDER — CEFTRIAXONE 1 G/50ML
1000 INJECTION, SOLUTION INTRAVENOUS ONCE
Status: COMPLETED | OUTPATIENT
Start: 2024-10-31 | End: 2024-10-31

## 2024-10-31 RX ORDER — LEVOTHYROXINE SODIUM 125 UG/1
125 TABLET ORAL
Status: DISCONTINUED | OUTPATIENT
Start: 2024-11-01 | End: 2024-11-01 | Stop reason: HOSPADM

## 2024-10-31 RX ORDER — EZETIMIBE 10 MG/1
10 TABLET ORAL DAILY
Status: DISCONTINUED | OUTPATIENT
Start: 2024-11-01 | End: 2024-11-01 | Stop reason: HOSPADM

## 2024-10-31 RX ORDER — ONDANSETRON 2 MG/ML
4 INJECTION INTRAMUSCULAR; INTRAVENOUS ONCE
Status: COMPLETED | OUTPATIENT
Start: 2024-10-31 | End: 2024-10-31

## 2024-10-31 RX ORDER — LOSARTAN POTASSIUM 50 MG/1
100 TABLET ORAL DAILY
Status: DISCONTINUED | OUTPATIENT
Start: 2024-11-01 | End: 2024-10-31

## 2024-10-31 RX ORDER — HYDROMORPHONE HCL/PF 1 MG/ML
1 SYRINGE (ML) INJECTION EVERY 4 HOURS PRN
Status: DISCONTINUED | OUTPATIENT
Start: 2024-10-31 | End: 2024-11-01

## 2024-10-31 RX ORDER — PANTOPRAZOLE SODIUM 40 MG/1
40 TABLET, DELAYED RELEASE ORAL
Status: DISCONTINUED | OUTPATIENT
Start: 2024-11-01 | End: 2024-11-01 | Stop reason: HOSPADM

## 2024-10-31 RX ORDER — ACETAMINOPHEN 10 MG/ML
1000 INJECTION, SOLUTION INTRAVENOUS EVERY 6 HOURS PRN
Status: DISCONTINUED | OUTPATIENT
Start: 2024-10-31 | End: 2024-11-01 | Stop reason: HOSPADM

## 2024-10-31 RX ORDER — HYDROMORPHONE HCL/PF 1 MG/ML
0.5 SYRINGE (ML) INJECTION ONCE
Status: COMPLETED | OUTPATIENT
Start: 2024-10-31 | End: 2024-10-31

## 2024-10-31 RX ORDER — LEVOTHYROXINE SODIUM 125 MCG
125 TABLET ORAL
COMMUNITY
Start: 2024-09-05

## 2024-10-31 RX ORDER — CEFTRIAXONE 1 G/50ML
1000 INJECTION, SOLUTION INTRAVENOUS EVERY 24 HOURS
Status: DISCONTINUED | OUTPATIENT
Start: 2024-11-01 | End: 2024-11-01 | Stop reason: HOSPADM

## 2024-10-31 RX ORDER — ONDANSETRON 2 MG/ML
4 INJECTION INTRAMUSCULAR; INTRAVENOUS EVERY 4 HOURS PRN
Status: DISCONTINUED | OUTPATIENT
Start: 2024-10-31 | End: 2024-11-01 | Stop reason: HOSPADM

## 2024-10-31 RX ORDER — ENOXAPARIN SODIUM 100 MG/ML
40 INJECTION SUBCUTANEOUS EVERY 24 HOURS
Status: DISCONTINUED | OUTPATIENT
Start: 2024-10-31 | End: 2024-11-01 | Stop reason: HOSPADM

## 2024-10-31 RX ORDER — HYDROMORPHONE HCL/PF 1 MG/ML
0.5 SYRINGE (ML) INJECTION EVERY 2 HOUR PRN
Status: DISCONTINUED | OUTPATIENT
Start: 2024-10-31 | End: 2024-10-31

## 2024-10-31 RX ORDER — OXYCODONE HYDROCHLORIDE 10 MG/1
10 TABLET ORAL EVERY 4 HOURS PRN
Status: DISCONTINUED | OUTPATIENT
Start: 2024-10-31 | End: 2024-11-01 | Stop reason: HOSPADM

## 2024-10-31 RX ORDER — ATORVASTATIN CALCIUM 10 MG/1
20 TABLET, FILM COATED ORAL DAILY
Status: DISCONTINUED | OUTPATIENT
Start: 2024-11-01 | End: 2024-10-31

## 2024-10-31 RX ORDER — ATORVASTATIN CALCIUM 10 MG/1
20 TABLET, FILM COATED ORAL
Status: DISCONTINUED | OUTPATIENT
Start: 2024-11-01 | End: 2024-11-01 | Stop reason: HOSPADM

## 2024-10-31 RX ORDER — LOSARTAN POTASSIUM 50 MG/1
100 TABLET ORAL DAILY
Status: DISCONTINUED | OUTPATIENT
Start: 2024-10-31 | End: 2024-11-01

## 2024-10-31 RX ORDER — SODIUM CHLORIDE 9 MG/ML
100 INJECTION, SOLUTION INTRAVENOUS CONTINUOUS
Status: DISCONTINUED | OUTPATIENT
Start: 2024-10-31 | End: 2024-11-01 | Stop reason: HOSPADM

## 2024-10-31 RX ADMIN — HYDROMORPHONE HYDROCHLORIDE 1 MG: 1 INJECTION, SOLUTION INTRAMUSCULAR; INTRAVENOUS; SUBCUTANEOUS at 19:31

## 2024-10-31 RX ADMIN — IOHEXOL 100 ML: 350 INJECTION, SOLUTION INTRAVENOUS at 11:16

## 2024-10-31 RX ADMIN — HYDROMORPHONE HYDROCHLORIDE 0.5 MG: 1 INJECTION, SOLUTION INTRAMUSCULAR; INTRAVENOUS; SUBCUTANEOUS at 10:25

## 2024-10-31 RX ADMIN — HYDROMORPHONE HYDROCHLORIDE 0.5 MG: 1 INJECTION, SOLUTION INTRAMUSCULAR; INTRAVENOUS; SUBCUTANEOUS at 11:56

## 2024-10-31 RX ADMIN — SODIUM CHLORIDE 1000 ML: 0.9 INJECTION, SOLUTION INTRAVENOUS at 10:18

## 2024-10-31 RX ADMIN — MORPHINE SULFATE 2 MG: 2 INJECTION, SOLUTION INTRAMUSCULAR; INTRAVENOUS at 17:04

## 2024-10-31 RX ADMIN — CEFTRIAXONE 1000 MG: 1 INJECTION, SOLUTION INTRAVENOUS at 11:51

## 2024-10-31 RX ADMIN — ONDANSETRON 4 MG: 2 INJECTION INTRAMUSCULAR; INTRAVENOUS at 10:25

## 2024-10-31 RX ADMIN — LOSARTAN POTASSIUM 100 MG: 50 TABLET, FILM COATED ORAL at 21:03

## 2024-10-31 RX ADMIN — SODIUM CHLORIDE 100 ML/HR: 0.9 INJECTION, SOLUTION INTRAVENOUS at 15:45

## 2024-10-31 NOTE — PLAN OF CARE
Problem: PAIN - ADULT  Goal: Verbalizes/displays adequate comfort level or baseline comfort level  Description: Interventions:  - Encourage patient to monitor pain and request assistance  - Assess pain using appropriate pain scale  - Administer analgesics based on type and severity of pain and evaluate response  - Implement non-pharmacological measures as appropriate and evaluate response  - Consider cultural and social influences on pain and pain management  - Notify physician/advanced practitioner if interventions unsuccessful or patient reports new pain  Outcome: Progressing     Problem: INFECTION - ADULT  Goal: Absence or prevention of progression during hospitalization  Description: INTERVENTIONS:  - Assess and monitor for signs and symptoms of infection  - Monitor lab/diagnostic results  - Monitor all insertion sites, i.e. indwelling lines, tubes, and drains  - Monitor endotracheal if appropriate and nasal secretions for changes in amount and color  - [place] appropriate cooling/warming therapies per order  - Administer medications as ordered  - Instruct and encourage patient and family to use good hand hygiene technique  - Identify and instruct in appropriate isolation precautions for identified infection/condition  Outcome: Progressing     Problem: DISCHARGE PLANNING  Goal: Discharge to home or other facility with appropriate resources  Description: INTERVENTIONS:  - Identify barriers to discharge w/patient and caregiver  - Arrange for needed discharge resources and transportation as appropriate  - Identify discharge learning needs (meds, wound care, etc.)  - Arrange for interpretive services to assist at discharge as needed  - Refer to Case Management Department for coordinating discharge planning if the patient needs post-hospital services based on physician/advanced practitioner order or complex needs related to functional status, cognitive ability, or social support system  Outcome: Progressing      Problem: Knowledge Deficit  Goal: Patient/family/caregiver demonstrates understanding of disease process, treatment plan, medications, and discharge instructions  Description: Complete learning assessment and assess knowledge base.  Interventions:  - Provide teaching at level of understanding  - Provide teaching via preferred learning methods  Outcome: Progressing

## 2024-10-31 NOTE — ASSESSMENT & PLAN NOTE
-Workup - CBC 13.5, Hb 18.5, Cr 1.33, UA large blood, with RBC. Lactic acid 1.3  -CT showed obstructing 1.1 cm calculus, mild right hydronephrosis  -Treatment - ceftriaxone, NaCl 1L bolus  -plan to continue ceftriaxone, IV fluids  -urology consulted by ED on admission - recommend stent placement however there was no OR time available at this facility, thus the decision was made to transfer to [facility] for urology procedure  -monitor ROSANA - creatinine 1.33 on admission, baseline 1-1.1  -IV pain medication PRN  -keep NPO at midnight

## 2024-10-31 NOTE — EMTALA/ACUTE CARE TRANSFER
[facility] EMERGENCY DEPARTMENT  [address] [address]  Dept: [phone]      EMTALA TRANSFER CONSENT    NAME Andriy Castillo                                         1960                              MRN [id number]    I have been informed of my rights regarding examination, treatment, and transfer   by Dr. Michael Crockett MD    Benefits: Specialized equipment and/or services available at the receiving facility (Include comment)________________________ (Urology)    Risks: Potential for delay in receiving treatment, Potential deterioration of medical condition, Loss of IV, Possible worsening of condition or death during transfer, Increased discomfort during transfer      Consent for Transfer:  I acknowledge that my medical condition has been evaluated and explained to me by the emergency department physician or other qualified medical person and/or my attending physician, who has recommended that I be transferred to the service of  Accepting Physician: Lily at Accepting Facility Name, City & State : [facility]. The above potential benefits of such transfer, the potential risks associated with such transfer, and the probable risks of not being transferred have been explained to me, and I fully understand them.  The doctor has explained that, in my case, the benefits of transfer outweigh the risks.  I agree to be transferred.    I authorize the performance of emergency medical procedures and treatments upon me in both transit and upon arrival at the receiving facility.  Additionally, I authorize the release of any and all medical records to the receiving facility and request they be transported with me, if possible.  I understand that the safest mode of transportation during a medical emergency is an ambulance and that the Hospital advocates the use of this mode of transport. Risks of traveling to the receiving facility by car, including absence of medical control, life  sustaining equipment, such as oxygen, and medical personnel has been explained to me and I fully understand them.    (SULTANA CORRECT BOX BELOW)  [  ]  I consent to the stated transfer and to be transported by ambulance/helicopter.  [  ]  I consent to the stated transfer, but refuse transportation by ambulance and accept full responsibility for my transportation by car.  I understand the risks of non-ambulance transfers and I exonerate the Hospital and its staff from any deterioration in my condition that results from this refusal.    X___________________________________________    DATE  10/31/24  TIME________  Signature of patient or legally responsible individual signing on patient behalf           RELATIONSHIP TO PATIENT_________________________          Provider Certification    NAME Andriy Castillo                                        [facility] 1960                              MRN [id number]    A medical screening exam was performed on the above named patient.  Based on the examination:    Condition Necessitating Transfer The primary encounter diagnosis was Nephrolithiasis. Diagnoses of ROSANA (acute kidney injury) (HCC) and Ureterolithiasis were also pertinent to this visit.    Patient Condition: The patient has been stabilized such that within reasonable medical probability, no material deterioration of the patient condition or the condition of the unborn child(camacho) is likely to result from the transfer    Reason for Transfer: Level of Care needed not available at this facility    Transfer Requirements: Facility B   Space available and qualified personnel available for treatment as acknowledged by [phone]  Agreed to accept transfer and to provide appropriate medical treatment as acknowledged by       Lily  Appropriate medical records of the examination and treatment of the patient are provided at the time of transfer   STAFF INITIAL WHEN COMPLETED _______  Transfer will be performed by qualified  personnel from [facility]  and appropriate transfer equipment as required, including the use of necessary and appropriate life support measures.    Provider Certification: I have examined the patient and explained the following risks and benefits of being transferred/refusing transfer to the patient/family:  Unanticipated needs of medical equipment and personnel during transport, General risk, such as traffic hazards, adverse weather conditions, rough terrain or turbulence, possible failure of equipment (including vehicle or aircraft), or consequences of actions of persons outside the control of the transport personnel, Risk of worsening condition, The possibility of a transport vehicle being unavailable      Based on these reasonable risks and benefits to the patient and/or the unborn child(camacho), and based upon the information available at the time of the patient’s examination, I certify that the medical benefits reasonably to be expected from the provision of appropriate medical treatments at another medical facility outweigh the increasing risks, if any, to the individual’s medical condition, and in the case of labor to the unborn child, from effecting the transfer.    X____________________________________________ DATE 10/31/24        TIME_______      ORIGINAL - SEND TO MEDICAL RECORDS   COPY - SEND WITH PATIENT DURING TRANSFER

## 2024-10-31 NOTE — H&P
H&P - Hospitalist   Name: Andriy Castillo 64 y.o. male I MRN: [id number]  Unit/Bed#: RM01 I Date of Admission: 10/31/2024   Date of Service: 10/31/2024 I Hospital Day: 0     Assessment & Plan  Hydronephrosis with obstructing calculus  -Workup - CBC 13.5, Hb 18.5, Cr 1.33, UA large blood, with RBC. Lactic acid 1.3  -CT showed obstructing 1.1 cm calculus, mild right hydronephrosis  -Treatment - ceftriaxone, NaCl 1L bolus  -plan to continue ceftriaxone, IV fluids  -urology consulted by ED on admission - recommend stent placement however there was no OR time available at this facility, thus the decision was made to transfer to [facility] for urology procedure  -monitor ROSANA - creatinine 1.33 on admission, baseline 1-1.1  -IV pain medication PRN  -keep NPO at midnight  Primary hypertension  -takes candesartan 16mg daily at night, c/w losartan 100mg while admitted  Hypothyroidism (acquired)  patient takes levothyroxine 125mcg and NOT 100mcg. This has been updated.   Elevated hemoglobin (HCC)  -outpatient workup      VTE Pharmacologic Prophylaxis:   pending procedure, will hold prophylasix for 1 day  Code Status: Level 1 - Full Code   Discussion with family: Patient declined call to .     Anticipated Length of Stay: Patient will be admitted on an inpatient basis with an anticipated length of stay of greater than 2 midnights secondary to hydronephrosis need stent per urology.    History of Present Illness   Chief Complaint: kidney stone    Andriy Castillo is a 64 y.o. male with a PMH of HTN, HLD, GERD, history of recurrent kidney stones who presents with right lower abdominal pain and flank pain. He reports the symptom started last night and was 10/10. He is able to urinate. Denies dysuria. Denies fever and chills.     ED course at [facility]:   Vitals - stable, elevated blood pressure  Workup - CBC 13.5, Hb 18.5, Cr 1.33, UA large blood, with RBC. Lactic acid 1.3  CT showed obstructing 1.1 cm calculus,  mild right hydronephrosis  Treatment - ceftriaxone, NaCl 1L bolus  Consultant - urology - accepted at [facility]  Plan - admit to [facility] inpatient unit, then transfer to [facility] for urology procedure on 11/1 morning    CT result:   1. Obstructing 1.1 cm calculus now present in the right ureteropelvic junction, resulting in mild right-sided hydronephrosis, with extensive perinephric stranding now noted.   2. Additional nonobstructive small 3 to 4 mm calculus involving the lower pole of the right kidney.   3. No left-sided hydronephrosis or hydroureter noted.     Review of Systems   Constitutional:  Negative for activity change, chills and fever.   HENT:  Negative for ear pain, hearing loss and sore throat.    Eyes:  Negative for pain, discharge and visual disturbance.   Respiratory:  Negative for cough and shortness of breath.    Cardiovascular:  Negative for chest pain and palpitations.   Gastrointestinal:  Positive for abdominal pain. Negative for constipation, diarrhea, nausea and vomiting.   Genitourinary:  Negative for difficulty urinating, dysuria and hematuria.   Musculoskeletal:  Negative for arthralgias and back pain.   Skin:  Negative for color change and rash.   Neurological:  Negative for dizziness, seizures, syncope and light-headedness.   All other systems reviewed and are negative.      Historical Information   Past Medical History:   Diagnosis Date    Disease of thyroid gland     GERD (gastroesophageal reflux disease)     Hyperlipidemia     Hypertension      Past Surgical History:   Procedure Laterality Date    CARPAL TUNNEL RELEASE      TONSILLECTOMY       Social History     Tobacco Use    Smoking status: Never    Smokeless tobacco: Never    Tobacco comments:     per [employer]-Former Smoker   Vaping Use    Vaping status: Never Used   Substance and Sexual Activity    Alcohol use: Yes     Comment: socially    Drug use: No     Comment: Denied use    Sexual activity: Not on file     E-Cigarette/Vaping     E-Cigarette Use Never User      E-Cigarette/Vaping Substances    Nicotine No     THC No     CBD No     Flavoring No     Other No     Unknown No      Family history non-contributory  Social History:  Marital Status: /Civil Union   Occupation:   Patient Pre-hospital Living Situation: Home  Patient Pre-hospital Level of Mobility: walks  Patient Pre-hospital Diet Restrictions: none    Meds/Allergies   I have reviewed home medications with patient personally.   Prior to Admission medications    Medication Sig Start Date End Date Taking? Authorizing Provider   atorvastatin (LIPITOR) 20 mg tablet Take 20 mg by mouth daily    Historical Provider, MD   candesartan (ATACAND) 16 mg tablet Take 16 mg by mouth daily    Historical Provider, MD   ezetimibe (ZETIA) 10 mg tablet Take 10 mg by mouth daily    Historical Provider, MD   levothyroxine 100 mcg tablet Take 100 mcg by mouth daily    Historical Provider, MD   pantoprazole (PROTONIX) 40 mg tablet Take 1 tablet (40 mg total) by mouth 2 (two) times a day 3/30/22   Serjio Rebolledo MD   Synthroid 125 MCG tablet Take 125 mcg by mouth daily before breakfast Take 30 minutes prior 9/5/24   Historical Provider, MD   capsicum (ZOSTRIX) 0.075 % topical cream Apply topically 3 (three) times a day as needed (rash pain) 2/18/24 10/31/24  Rupal Lomeli DO   nebivolol (BYSTOLIC) 5 mg tablet Take 5 mg by mouth daily  10/31/24  Historical Provider, MD   valACYclovir (VALTREX) 1,000 mg tablet Take 1 tablet (1,000 mg total) by mouth 3 (three) times a day for 7 days 2/18/24 10/31/24  Rupal Lomeli DO     No Known Allergies    Objective :  Temp:  [98 °F (36.7 °C)-98.8 °F (37.1 °C)] 98.8 °F (37.1 °C)  HR:  [74-84] 79  BP: (138-176)/(81-95) 165/88  Resp:  [18-20] 18  SpO2:  [92 %-97 %] 93 %  O2 Device: None (Room air)    Physical Exam  Vitals and nursing note reviewed.   Constitutional:       General: He is not in acute distress.     Appearance: He is well-developed.   HENT:       Head: Normocephalic and atraumatic.      Nose: Nose normal.   Eyes:      Extraocular Movements: Extraocular movements intact.      Conjunctiva/sclera: Conjunctivae normal.   Cardiovascular:      Rate and Rhythm: Normal rate and regular rhythm.      Heart sounds: Normal heart sounds. No murmur heard.  Pulmonary:      Effort: Pulmonary effort is normal. No respiratory distress.      Breath sounds: Normal breath sounds. No stridor. No wheezing, rhonchi or rales.   Abdominal:      General: Abdomen is flat. Bowel sounds are normal. There is no distension.      Palpations: Abdomen is soft. There is no mass.      Tenderness: There is no abdominal tenderness. There is no right CVA tenderness, left CVA tenderness, guarding or rebound.      Hernia: No hernia is present.   Musculoskeletal:         General: No swelling.      Cervical back: Neck supple.   Skin:     General: Skin is warm and dry.      Capillary Refill: Capillary refill takes less than 2 seconds.   Neurological:      General: No focal deficit present.      Mental Status: He is alert. Mental status is at baseline.   Psychiatric:         Mood and Affect: Mood normal.         Behavior: Behavior normal.          Lines/Drains:            Lab Results: I have reviewed the following results:  Results from last 7 days   Lab Units 10/31/24  1017   WBC Thousand/uL 13.52*   HEMOGLOBIN g/dL 18.5*   HEMATOCRIT % 57.2*   PLATELETS Thousands/uL 202   SEGS PCT % 79*   LYMPHO PCT % 10*   MONO PCT % 10   EOS PCT % 0     Results from last 7 days   Lab Units 10/31/24  1017   SODIUM mmol/L 136   POTASSIUM mmol/L 4.3   CHLORIDE mmol/L 101   CO2 mmol/L 27   BUN mg/dL 13   CREATININE mg/dL 1.33*   ANION GAP mmol/L 8   CALCIUM mg/dL 9.5   ALBUMIN g/dL 4.5   TOTAL BILIRUBIN mg/dL 0.94   ALK PHOS U/L 69   ALT U/L 20   AST U/L 22   GLUCOSE RANDOM mg/dL 126             Lab Results   Component Value Date    HGBA1C 5.9 (H) 01/11/2023    HGBA1C 5.5 06/20/2017     Results from last 7 days   Lab  Units 10/31/24  1017   LACTIC ACID mmol/L 1.3       Imaging Results Review: I reviewed radiology reports from this admission including: CT abdomen/pelvis.  Other Study Results Review: EKG was reviewed.     Administrative Statements   I have spent a total time of 60 minutes in caring for this patient on the day of the visit/encounter including Diagnostic results, Prognosis, Risks and benefits of tx options, and Instructions for management.    ** Please Note: This note has been constructed using a voice recognition system. **

## 2024-10-31 NOTE — QUICK NOTE
Per latest update, Andriy Castillo in [facility] ED 1 has a  time of 0815 on 11/1/24 by Diana MCMAHON and will be going to [facility] for Dr CARMEL Bautista (urologist). He is admitted to [facility] and then transferred to [facility] hospital.

## 2024-10-31 NOTE — PROGRESS NOTES
[facility]        NAME: Andriy Castillo is a 64 y.o. male  : 1960    MRN: [id number]  DATE: 2024  TIME: 9:32 AM    Assessment and Plan   Dysuria [R30.0]  1. Dysuria  POCT urine dip    Urine culture    Urine culture        Discussed problem with patient.  Urine dip revealed moderate blood in urine.  Differential includes gallstones, kidney stones.  Advised high-level care and patient is opting report to [facility].  Driving via personal vehicle    Patient Instructions       Follow up with PCP in 3-5 days.  Proceed to  ER if symptoms worsen.    If tests are performed, our office will contact you with results only if changes need to made to the care plan discussed with you at the visit. You can review your full results on [facility].    Chief Complaint     Chief Complaint   Patient presents with    Flank Pain     Started last night on right side. Has a history of kidney stones on right side.          History of Present Illness       64-year-old male with a past medical history of kidney stones presents with right-sided upper abdominal pain.  States symptoms started suddenly overnight interval and been progressing.  States he feels nauseous but has not vomited.  Denies any flank pain but states it radiates towards his back.  Denies any fevers or chills and is drinking liquids and does not have an appetite.  Rates his pain 9 out of 10 and does not feel like a kidney stone pain    Flank Pain  Associated symptoms include abdominal pain. Pertinent negatives include no chest pain, dysuria or fever.       Review of Systems   Review of Systems   Constitutional:  Negative for appetite change, chills, fatigue and fever.   Respiratory:  Negative for cough, shortness of breath, wheezing and stridor.    Cardiovascular:  Negative for chest pain and palpitations.   Gastrointestinal:  Positive for abdominal pain. Negative for constipation, diarrhea, nausea and vomiting.    Genitourinary:  Positive for flank pain. Negative for dysuria, frequency, hematuria and urgency.   Musculoskeletal:  Negative for myalgias.         Current Medications       Current Outpatient Medications:     atorvastatin (LIPITOR) 20 mg tablet, Take 20 mg by mouth daily, Disp: , Rfl:     candesartan (ATACAND) 16 mg tablet, Take 16 mg by mouth daily (Patient not taking: Reported on 5/28/2024), Disp: , Rfl:     capsicum (ZOSTRIX) 0.075 % topical cream, Apply topically 3 (three) times a day as needed (rash pain), Disp: 120 g, Rfl: 0    ezetimibe (ZETIA) 10 mg tablet, Take 10 mg by mouth daily, Disp: , Rfl:     levothyroxine 100 mcg tablet, Take 100 mcg by mouth daily, Disp: , Rfl:     nebivolol (BYSTOLIC) 5 mg tablet, Take 5 mg by mouth daily, Disp: , Rfl:     pantoprazole (PROTONIX) 40 mg tablet, Take 1 tablet (40 mg total) by mouth 2 (two) times a day, Disp: 60 tablet, Rfl: 0    valACYclovir (VALTREX) 1,000 mg tablet, Take 1 tablet (1,000 mg total) by mouth 3 (three) times a day for 7 days, Disp: 21 tablet, Rfl: 0    Current Allergies     Allergies as of 10/31/2024    (No Known Allergies)            The following portions of the patient's history were reviewed and updated as appropriate: allergies, current medications, past family history, past medical history, past social history, past surgical history and problem list.     Past Medical History:   Diagnosis Date    Disease of thyroid gland     GERD (gastroesophageal reflux disease)     Hyperlipidemia     Hypertension        Past Surgical History:   Procedure Laterality Date    CARPAL TUNNEL RELEASE      TONSILLECTOMY         Family History   Problem Relation Age of Onset    Thyroid disease Mother     Heart attack Father     Heart attack Paternal Grandmother     Heart disease Paternal Grandmother         Pacemaker    Heart disease Paternal Grandfather          Medications have been verified.        Objective   There were no vitals taken for this visit.        Physical Exam     Physical Exam  Vitals and nursing note reviewed.   Constitutional:       General: He is not in acute distress.     Appearance: Normal appearance. He is normal weight. He is not ill-appearing, toxic-appearing or diaphoretic.   Cardiovascular:      Rate and Rhythm: Normal rate and regular rhythm.      Pulses: Normal pulses.      Heart sounds: Normal heart sounds. No murmur heard.     No friction rub. No gallop.   Pulmonary:      Effort: Pulmonary effort is normal. No respiratory distress.      Breath sounds: Normal breath sounds. No stridor. No wheezing, rhonchi or rales.   Chest:      Chest wall: No tenderness.   Abdominal:      General: Abdomen is flat. Bowel sounds are normal. There is distension.      Palpations: Abdomen is soft. There is no mass.      Tenderness: There is abdominal tenderness (Right upper quadrant tenderness) in the right upper quadrant. There is no right CVA tenderness, left CVA tenderness, guarding or rebound. Negative signs include Olsen's sign, Rovsing's sign, McBurney's sign, psoas sign and obturator sign.      Hernia: No hernia is present.   Neurological:      Mental Status: He is alert.

## 2024-10-31 NOTE — ED NOTES
Pt aware of need for urine specimen. Unable to void at this time     Daphne Walker RN  10/31/24 [id number]

## 2024-10-31 NOTE — ED PROVIDER NOTES
Final Diagnosis:  1. Nephrolithiasis    2. ROSANA (acute kidney injury) (HCC)    3. Ureterolithiasis        MDM:  -Patient presents for abdominal pain.  Given location concern for possible cholecystitis, diverticulitis, bowel obstruction, pancreatitis, appendicitis.  Will also evaluate for pyelonephritis, nephrolithiasis.  Provide analgesia.  Evaluate for ACS or arrhythmia.  - On my interpretation patient had a large, right-sided kidney stone.  Reviewed with the urology team.  Patient will require stent placement.  Will arrange for transfer to [facility] for definitive treatment.    Chief Complaint   Patient presents with    Abdominal Pain     Right lower abdominal pain that started yesterday. States he has a history of kidney stones but pain feels different from previous kidney stones. Complains of nausea, denies any vomiting or diarrhea      HPI  Patient presents for evaluation abdominal pain.  Patient states that last night he started to have some right-sided abdominal pain.  At first he felt this was a kidney stone he has a history of these in the past.  He states that then as the pain progressed it became more anterior in nature.  He endorses nausea without vomiting.  It is worse with palpation.  He states that he did have a history of a bowel obstruction and sounds like that was fixed nonsurgically.  He states he does have his gallbladder as well as appendix.  No fever or chills.      Unless otherwise specified:  - No language barrier.   - History obtained from patient.   - There are no limitations to the history obtained.  - Previous charting was reviewed    PMH:   has a past medical history of Disease of thyroid gland, GERD (gastroesophageal reflux disease), Hyperlipidemia, and Hypertension.    PSH:   has a past surgical history that includes Carpal tunnel release and Tonsillectomy.       ROS:  Review of Systems   - 13 point ROS was performed and all are normal unless stated in the history above.   -  Nursing note reviewed. Vitals reviewed.   - Orders placed by myself and/or advanced practitioner / resident.        PE:   Vitals:    10/31/24 1005 10/31/24 1007 10/31/24 1100   BP:  (!) 176/95 148/91   BP Location:  Left arm Left arm   Pulse: 82  74   Resp: 18  20   Temp: 98.8 °F (37.1 °C)     TempSrc: Temporal     SpO2: 97%  92%     Vitals reviewed by me.     Patient appears uncomfortable in bed.  Right-sided abdominal tenderness with palpation without rebound or guarding.  Unless otherwise specified above:    General: VS reviewed  Appears in NAD    Head: Normocephalic, atraumatic  Eyes: EOM-I.     ENT: Atraumatic external nose and ears.    No drooling.     Neck: No JVD.    CV: No pallor noted  Lungs:   No tachypnea  No respiratory distress    Abdomen:  Soft, non-tender, non-distended    MSK:   No obvious deformity    Skin: Dry, intact. No obvious rash.    Psychiatric/Behavioral: Appropriate mood and affect   Exam: deferred    Physical Exam     CriticalCare Time    Date/Time: 10/31/2024 12:19 PM    Performed by: Michael Crockett MD  Authorized by: Michael Crockett MD    Critical care provider statement:     Critical care time (minutes):  37    Critical care was necessary to treat or prevent imminent or life-threatening deterioration of the following conditions: Kidney stone requiring urgent urology management.    Critical care was time spent personally by me on the following activities:  Obtaining history from patient or surrogate, development of treatment plan with patient or surrogate, discussions with consultants, evaluation of patient's response to treatment, examination of patient, re-evaluation of patient's condition, ordering and review of radiographic studies, ordering and review of laboratory studies and ordering and performing treatments and interventions     - Nursing note reviewed.                   ED Course as of 10/31/24 1220   Thu Oct 31, 2024   1037 Procedure Note: EKG  Date/Time: 10/31/24  10:37 AM   Interpreted by: Michael Crockett  Indications / Diagnosis: Abdominal pain  ECG reviewed by me, the ED Provider: yes   The EKG demonstrates:  Rhythm: normal sinus  Intervals: normal intervals  Axis: normal axis  QRS/Blocks: normal QRS  ST Changes: No acute ST Changes, no STD/MARIANA.    No diffuse elevations to indicate pericarditis.  No coved ST elevations greater than 2mm with negative T waves in V1-3 to indicate concern for brugada.  No biphasic T waves in V2, V3 to indicate Wellens (critical stenosis of LAD).   No elevation in aVR or deviation when compared to V1 (can be associated with ST depression in I,II, V4-6 when left main occlusion is present).       1137 Reach out to urology secondary to concern for large kidney stone.  They reviewed case.  Suggest giving a prophylactic dose antibiotics.  Apparently they are onsite today so they are going to review with they are able to place a possible stent at this campus.   1218 No OR times available at this facility.  Urology suggested transfer to another facility for operative management.  Reviewed with patient.  Discussed with admitting team at [facility].  Will be transferred to their service.     CT abdomen pelvis with contrast   ED Interpretation   Abnormal   Proximal, obstructing stone in right ureter measuring between 8 mm and 11 mm depending on view.  Surrounding inflammatory changes to kidney.  I do not appreciate any free air.  Gallbladder appears unremarkable.  No signs of clear obstruction on my interpretation.        Orders Placed This Encounter   Procedures    Critical Care    CT abdomen pelvis with contrast    CBC and differential    Comprehensive metabolic panel    Lipase    HS Troponin 0hr (reflex protocol)    UA w Reflex to Microscopic w Reflex to Culture    Lactic acid, plasma (w/reflex if result > 2.0)    Urine Microscopic    Diet NPO    Insert peripheral IV    ECG 12 lead    ECG 12 lead    Transfer to other facility     Labs  Reviewed   CBC AND DIFFERENTIAL - Abnormal       Result Value Ref Range Status    WBC 13.52 (*) 4.31 - 10.16 Thousand/uL Final    RBC 6.43 (*) 3.88 - 5.62 Million/uL Final    Hemoglobin 18.5 (*) 12.0 - 17.0 g/dL Final    Hematocrit 57.2 (*) 36.5 - 49.3 % Final    MCV 89  82 - 98 fL Final    MCH 28.8  26.8 - 34.3 pg Final    MCHC 32.3  31.4 - 37.4 g/dL Final    RDW 13.3  11.6 - 15.1 % Final    MPV 10.3  8.9 - 12.7 fL Final    Platelets 202  149 - 390 Thousands/uL Final    nRBC 0  /100 WBCs Final    Segmented % 79 (*) 43 - 75 % Final    Immature Grans % 0  0 - 2 % Final    Lymphocytes % 10 (*) 14 - 44 % Final    Monocytes % 10  4 - 12 % Final    Eosinophils Relative 0  0 - 6 % Final    Basophils Relative 1  0 - 1 % Final    Absolute Neutrophils 10.63 (*) 1.85 - 7.62 Thousands/µL Final    Absolute Immature Grans 0.03  0.00 - 0.20 Thousand/uL Final    Absolute Lymphocytes 1.38  0.60 - 4.47 Thousands/µL Final    Absolute Monocytes 1.34 (*) 0.17 - 1.22 Thousand/µL Final    Eosinophils Absolute 0.05  0.00 - 0.61 Thousand/µL Final    Basophils Absolute 0.09  0.00 - 0.10 Thousands/µL Final   COMPREHENSIVE METABOLIC PANEL - Abnormal    Sodium 136  135 - 147 mmol/L Final    Potassium 4.3  3.5 - 5.3 mmol/L Final    Chloride 101  96 - 108 mmol/L Final    CO2 27  21 - 32 mmol/L Final    ANION GAP 8  4 - 13 mmol/L Final    BUN 13  5 - 25 mg/dL Final    Creatinine 1.33 (*) 0.60 - 1.30 mg/dL Final    Comment: Standardized to IDMS reference method    Glucose 126  65 - 140 mg/dL Final    Comment: If the patient is fasting, the ADA then defines impaired fasting glucose as > 100 mg/dL and diabetes as > or equal to 123 mg/dL.    Calcium 9.5  8.4 - 10.2 mg/dL Final    AST 22  13 - 39 U/L Final    ALT 20  7 - 52 U/L Final    Comment: Specimen collection should occur prior to Sulfasalazine administration due to the potential for falsely depressed results.     Alkaline Phosphatase 69  34 - 104 U/L Final    Total Protein 7.6  6.4 - 8.4 g/dL  Final    Albumin 4.5  3.5 - 5.0 g/dL Final    Total Bilirubin 0.94  0.20 - 1.00 mg/dL Final    Comment: Use of this assay is not recommended for patients undergoing treatment with eltrombopag due to the potential for falsely elevated results.  N-acetyl-p-benzoquinone imine (metabolite of Acetaminophen) will generate erroneously low results in samples for patients that have taken an overdose of Acetaminophen.    eGFR 55  ml/min/1.73sq m Final    Narrative:     National Kidney Disease Foundation guidelines for Chronic Kidney Disease (CKD):     Stage 1 with normal or high GFR (GFR > 90 mL/min/1.73 square meters)    Stage 2 Mild CKD (GFR = 60-89 mL/min/1.73 square meters)    Stage 3A Moderate CKD (GFR = 45-59 mL/min/1.73 square meters)    Stage 3B Moderate CKD (GFR = 30-44 mL/min/1.73 square meters)    Stage 4 Severe CKD (GFR = 15-29 mL/min/1.73 square meters)    Stage 5 End Stage CKD (GFR <15 mL/min/1.73 square meters)  Note: GFR calculation is accurate only with a steady state creatinine   UA W REFLEX TO MICROSCOPIC WITH REFLEX TO CULTURE - Abnormal    Color, UA Yellow   Final    Clarity, UA Slightly Cloudy   Final    Specific Gravity, UA 1.015  1.005 - 1.030 Final    pH, UA 5.5  4.5, 5.0, 5.5, 6.0, 6.5, 7.0, 7.5, 8.0 Final    Leukocytes, UA Negative  Negative Final    Nitrite, UA Negative  Negative Final    Protein, UA Trace (*) Negative mg/dl Final    Glucose, UA Negative  Negative mg/dl Final    Ketones, UA Negative  Negative mg/dl Final    Urobilinogen, UA <2.0  <2.0 mg/dl mg/dl Final    Bilirubin, UA Negative  Negative Final    Occult Blood, UA Large (*) Negative Final   URINE MICROSCOPIC - Abnormal    RBC, UA 20-30 (*) None Seen, 0-1, 1-2, 2-4, 0-5 /hpf Final    WBC, UA None Seen  None Seen, 0-1, 1-2, 0-5, 2-4 /hpf Final    Epithelial Cells None Seen  None Seen, Occasional /hpf Final    Bacteria, UA None Seen  None Seen, Occasional /hpf Final   LIPASE - Normal    Lipase 42  11 - 82 u/L Final   HS TROPONIN I 0HR  "- Normal    hs TnI 0hr 18  \"Refer to ACS Flowchart\"- see link ng/L Final    Comment:                                              Initial (time 0) result  If >=50 ng/L, Myocardial injury suggested ;  Type of myocardial injury and treatment strategy  to be determined.  If 5-49 ng/L, a delta result at 2 hours and or 4 hours will be needed to further evaluate.  If <4 ng/L, and chest pain has been >3 hours since onset, patient may qualify for discharge based on the HEART score in the ED.  If <5 ng/L and <3hours since onset of chest pain, a delta result at 2 hours will be needed to further evaluate.    HS Troponin 99th Percentile URL of a Health Population=12 ng/L with a 95% Confidence Interval of 8-18 ng/L.    Second Troponin (time 2 hours)  If calculated delta >= 20 ng/L,  Myocardial injury suggested ; Type of myocardial injury and treatment strategy to be determined.  If 5-49 ng/L and the calculated delta is 5-19 ng/L, consult medical service for evaluation.  Continue evaluation for ischemia on ecg and other possible etiology and repeat hs troponin at 4 hours.  If delta is <5 ng/L at 2 hours, consider discharge based on risk stratification via the HEART score (if in ED), or KIERSTEN risk score in IP/Observation.    HS Troponin 99th Percentile URL of a Health Population=12 ng/L with a 95% Confidence Interval of 8-18 ng/L.   LACTIC ACID, PLASMA (W/REFLEX IF RESULT > 2.0) - Normal    LACTIC ACID 1.3  0.5 - 2.0 mmol/L Final    Narrative:     Result may be elevated if tourniquet was used during collection.         Final Diagnosis:  1. Nephrolithiasis    2. ROSANA (acute kidney injury) (HCC)    3. Ureterolithiasis              Unless otherwise noted the patient's medications were reviewed and they should continue as directed.    Unless otherwise specified:  CC is exclusive from any separately billable procedures  CC is exclusive of treating other patients  CC is exclusive of teaching time   All splints are static    Medications "   cefTRIAXone (ROCEPHIN) IVPB (premix in dextrose) 1,000 mg 50 mL (1,000 mg Intravenous New Bag 10/31/24 1151)   sodium chloride 0.9 % bolus 1,000 mL (0 mL Intravenous Stopped 10/31/24 1110)   HYDROmorphone (DILAUDID) injection 0.5 mg (0.5 mg Intravenous Given 10/31/24 1025)   ondansetron (ZOFRAN) injection 4 mg (4 mg Intravenous Given 10/31/24 1025)   iohexol (OMNIPAQUE) 350 MG/ML injection (MULTI-DOSE) 100 mL (100 mL Intravenous Given 10/31/24 1116)   HYDROmorphone (DILAUDID) injection 0.5 mg (0.5 mg Intravenous Given 10/31/24 1156)     Time reflects when diagnosis was documented in both MDM as applicable and the Disposition within this note       Time User Action Codes Description Comment    10/31/2024 11:56 AM Catrachita Shah Add [N17.9] ROSANA (acute kidney injury) ([facility])     10/31/2024 11:56 AM Catrachita Shah Add [N20.1] Ureterolithiasis     10/31/2024 12:14 PM Michael Crockett Add [N20.0] Nephrolithiasis     10/31/2024 12:14 PM Michael Crockett Modify [N17.9] ROSANA (acute kidney injury) ([facility])     10/31/2024 12:14 PM Michael Crockett Modify [N20.0] Nephrolithiasis           ED Disposition       ED Disposition   Transfer to Another Facility-In Network    Condition   --    Date/Time   Thu Oct 31, 2024 12:13 PM    Comment   Andriy Castillo should be transferred out to B.               MD Documentation      Flowsheet Row Most Recent Value   Patient Condition The patient has been stabilized such that within reasonable medical probability, no material deterioration of the patient condition or the condition of the unborn child(camacho) is likely to result from the transfer   Reason for Transfer Level of Care needed not available at this facility   Benefits of Transfer Specialized equipment and/or services available at the receiving facility (Include comment)________________________  [Urology]   Risks of Transfer Potential for delay in receiving treatment, Potential deterioration of medical condition, Loss of IV,  Possible worsening of condition or death during transfer, Increased discomfort during transfer   Accepting Physician Lily   Accepting Facility Name, [facility]    (Name & Tel number) [phone]   Transported by (Company and Unit #) [employer]   Sending MD Crockett   Provider Certification Unanticipated needs of medical equipment and personnel during transport, General risk, such as traffic hazards, adverse weather conditions, rough terrain or turbulence, possible failure of equipment (including vehicle or aircraft), or consequences of actions of persons outside the control of the transport personnel, Risk of worsening condition, The possibility of a transport vehicle being unavailable          RN Documentation      Flowsheet Row Most Recent Value   Accepting Facility Name, [facility]    (Name & Tel number) [phone]   Transported by (Company and Unit #) [employer]          Follow-up Information    None       Patient's Medications   Discharge Prescriptions    No medications on file     No discharge procedures on file.  Prior to Admission Medications   Prescriptions Last Dose Informant Patient Reported? Taking?   Synthroid 125 MCG tablet   Yes No   Sig: Take 125 mcg by mouth daily before breakfast Take 30 minutes prior   atorvastatin (LIPITOR) 20 mg tablet   Yes No   Sig: Take 20 mg by mouth daily   candesartan (ATACAND) 16 mg tablet   Yes No   Sig: Take 16 mg by mouth daily   capsicum (ZOSTRIX) 0.075 % topical cream   No No   Sig: Apply topically 3 (three) times a day as needed (rash pain)   ezetimibe (ZETIA) 10 mg tablet   Yes No   Sig: Take 10 mg by mouth daily   levothyroxine 100 mcg tablet   Yes No   Sig: Take 100 mcg by mouth daily   nebivolol (BYSTOLIC) 5 mg tablet   Yes No   Sig: Take 5 mg by mouth daily   pantoprazole (PROTONIX) 40 mg tablet   No No   Sig: Take 1 tablet (40 mg total) by mouth 2 (two) times a day   valACYclovir (VALTREX) 1,000 mg tablet   No No  "  Sig: Take 1 tablet (1,000 mg total) by mouth 3 (three) times a day for 7 days      Facility-Administered Medications: None       Portions of the record may have been created with voice recognition software. Occasional wrong word or \"sound a like\" substitutions may have occurred due to the inherent limitations of voice recognition software. Read the chart carefully and recognize, using context, where substitutions have occurred.    Electronically signed by:  MD Michael Thompson MD  10/31/24 1220    "

## 2024-11-01 ENCOUNTER — TELEPHONE (OUTPATIENT)
Dept: UROLOGY | Facility: CLINIC | Age: 64
End: 2024-11-01

## 2024-11-01 ENCOUNTER — HOSPITAL ENCOUNTER (OUTPATIENT)
Facility: HOSPITAL | Age: 64
Setting detail: OUTPATIENT SURGERY
Discharge: HOME | End: 2024-11-01
Attending: INTERNAL MEDICINE | Admitting: INTERNAL MEDICINE
Payer: COMMERCIAL

## 2024-11-01 ENCOUNTER — APPOINTMENT (INPATIENT)
Dept: RADIOLOGY | Facility: HOSPITAL | Age: 64
End: 2024-11-01
Payer: COMMERCIAL

## 2024-11-01 VITALS
OXYGEN SATURATION: 94 % | HEART RATE: 83 BPM | TEMPERATURE: 98.5 F | HEIGHT: 70 IN | SYSTOLIC BLOOD PRESSURE: 134 MMHG | RESPIRATION RATE: 20 BRPM | DIASTOLIC BLOOD PRESSURE: 86 MMHG | BODY MASS INDEX: 31.31 KG/M2 | WEIGHT: 218.7 LBS

## 2024-11-01 VITALS
HEART RATE: 86 BPM | TEMPERATURE: 98.7 F | OXYGEN SATURATION: 95 % | RESPIRATION RATE: 20 BRPM | SYSTOLIC BLOOD PRESSURE: 161 MMHG | DIASTOLIC BLOOD PRESSURE: 89 MMHG

## 2024-11-01 DIAGNOSIS — N17.9 AKI (ACUTE KIDNEY INJURY) (HCC): ICD-10-CM

## 2024-11-01 DIAGNOSIS — N13.2 HYDRONEPHROSIS WITH OBSTRUCTING CALCULUS: Primary | ICD-10-CM

## 2024-11-01 DIAGNOSIS — N20.1 URETEROLITHIASIS: ICD-10-CM

## 2024-11-01 PROBLEM — E83.42 HYPOMAGNESEMIA: Status: ACTIVE | Noted: 2024-11-01

## 2024-11-01 LAB
ALBUMIN SERPL BCG-MCNC: 4.1 G/DL (ref 3.5–5)
ALP SERPL-CCNC: 62 U/L (ref 34–104)
ALT SERPL W P-5'-P-CCNC: 16 U/L (ref 7–52)
ANION GAP SERPL CALCULATED.3IONS-SCNC: 7 MMOL/L (ref 4–13)
APTT PPP: 29 SECONDS (ref 23–34)
AST SERPL W P-5'-P-CCNC: 20 U/L (ref 13–39)
ATRIAL RATE: 76 BPM
BACTERIA UR CULT: NORMAL
BASOPHILS # BLD AUTO: 0.07 THOUSANDS/ΜL (ref 0–0.1)
BASOPHILS NFR BLD AUTO: 1 % (ref 0–1)
BILIRUB SERPL-MCNC: 1.41 MG/DL (ref 0.2–1)
BUN SERPL-MCNC: 13 MG/DL (ref 5–25)
CALCIUM SERPL-MCNC: 9 MG/DL (ref 8.4–10.2)
CHLORIDE SERPL-SCNC: 103 MMOL/L (ref 96–108)
CO2 SERPL-SCNC: 26 MMOL/L (ref 21–32)
CREAT SERPL-MCNC: 1.58 MG/DL (ref 0.6–1.3)
EOSINOPHIL # BLD AUTO: 0.04 THOUSAND/ΜL (ref 0–0.61)
EOSINOPHIL NFR BLD AUTO: 0 % (ref 0–6)
ERYTHROCYTE [DISTWIDTH] IN BLOOD BY AUTOMATED COUNT: 13.6 % (ref 11.6–15.1)
GFR SERPL CREATININE-BSD FRML MDRD: 45 ML/MIN/1.73SQ M
GLUCOSE P FAST SERPL-MCNC: 118 MG/DL (ref 65–99)
GLUCOSE SERPL-MCNC: 118 MG/DL (ref 65–140)
HCT VFR BLD AUTO: 52.8 % (ref 36.5–49.3)
HGB BLD-MCNC: 17.1 G/DL (ref 12–17)
IMM GRANULOCYTES # BLD AUTO: 0.03 THOUSAND/UL (ref 0–0.2)
IMM GRANULOCYTES NFR BLD AUTO: 0 % (ref 0–2)
INR PPP: 1 (ref 0.85–1.19)
LYMPHOCYTES # BLD AUTO: 1.63 THOUSANDS/ΜL (ref 0.6–4.47)
LYMPHOCYTES NFR BLD AUTO: 14 % (ref 14–44)
MAGNESIUM SERPL-MCNC: 1.6 MG/DL (ref 1.9–2.7)
MCH RBC QN AUTO: 28.7 PG (ref 26.8–34.3)
MCHC RBC AUTO-ENTMCNC: 32.4 G/DL (ref 31.4–37.4)
MCV RBC AUTO: 89 FL (ref 82–98)
MONOCYTES # BLD AUTO: 1.35 THOUSAND/ΜL (ref 0.17–1.22)
MONOCYTES NFR BLD AUTO: 12 % (ref 4–12)
MRSA NOSE QL CULT: NORMAL
NEUTROPHILS # BLD AUTO: 8.17 THOUSANDS/ΜL (ref 1.85–7.62)
NEUTS SEG NFR BLD AUTO: 73 % (ref 43–75)
NRBC BLD AUTO-RTO: 0 /100 WBCS
P AXIS: -19 DEGREES
PHOSPHATE SERPL-MCNC: 2.9 MG/DL (ref 2.3–4.1)
PLATELET # BLD AUTO: 182 THOUSANDS/UL (ref 149–390)
PMV BLD AUTO: 9.9 FL (ref 8.9–12.7)
POTASSIUM SERPL-SCNC: 4.2 MMOL/L (ref 3.5–5.3)
PR INTERVAL: 222 MS
PROT SERPL-MCNC: 6.9 G/DL (ref 6.4–8.4)
PROTHROMBIN TIME: 13.7 SECONDS (ref 12.3–15)
QRS AXIS: 258 DEGREES
QRSD INTERVAL: 78 MS
QT INTERVAL: 366 MS
QTC INTERVAL: 411 MS
RBC # BLD AUTO: 5.95 MILLION/UL (ref 3.88–5.62)
SODIUM SERPL-SCNC: 136 MMOL/L (ref 135–147)
T WAVE AXIS: 35 DEGREES
VENTRICULAR RATE: 76 BPM
WBC # BLD AUTO: 11.29 THOUSAND/UL (ref 4.31–10.16)

## 2024-11-01 PROCEDURE — C2625 STENT, NON-COR, TEM W/DEL SY: HCPCS | Performed by: UROLOGY

## 2024-11-01 PROCEDURE — 85025 COMPLETE CBC W/AUTO DIFF WBC: CPT | Performed by: STUDENT IN AN ORGANIZED HEALTH CARE EDUCATION/TRAINING PROGRAM

## 2024-11-01 PROCEDURE — 74420 UROGRAPHY RTRGR +-KUB: CPT

## 2024-11-01 PROCEDURE — 99235 HOSP IP/OBS SAME DATE MOD 70: CPT | Performed by: INTERNAL MEDICINE

## 2024-11-01 PROCEDURE — 84100 ASSAY OF PHOSPHORUS: CPT | Performed by: STUDENT IN AN ORGANIZED HEALTH CARE EDUCATION/TRAINING PROGRAM

## 2024-11-01 PROCEDURE — 93010 ELECTROCARDIOGRAM REPORT: CPT | Performed by: INTERNAL MEDICINE

## 2024-11-01 PROCEDURE — 82360 CALCULUS ASSAY QUANT: CPT | Performed by: UROLOGY

## 2024-11-01 PROCEDURE — 52356 CYSTO/URETERO W/LITHOTRIPSY: CPT | Performed by: UROLOGY

## 2024-11-01 PROCEDURE — 83735 ASSAY OF MAGNESIUM: CPT | Performed by: STUDENT IN AN ORGANIZED HEALTH CARE EDUCATION/TRAINING PROGRAM

## 2024-11-01 PROCEDURE — 80053 COMPREHEN METABOLIC PANEL: CPT | Performed by: STUDENT IN AN ORGANIZED HEALTH CARE EDUCATION/TRAINING PROGRAM

## 2024-11-01 PROCEDURE — C1769 GUIDE WIRE: HCPCS | Performed by: UROLOGY

## 2024-11-01 PROCEDURE — 85610 PROTHROMBIN TIME: CPT | Performed by: STUDENT IN AN ORGANIZED HEALTH CARE EDUCATION/TRAINING PROGRAM

## 2024-11-01 PROCEDURE — 99239 HOSP IP/OBS DSCHRG MGMT >30: CPT | Performed by: INTERNAL MEDICINE

## 2024-11-01 PROCEDURE — 99245 OFF/OP CONSLTJ NEW/EST HI 55: CPT | Performed by: UROLOGY

## 2024-11-01 PROCEDURE — C1758 CATHETER, URETERAL: HCPCS | Performed by: UROLOGY

## 2024-11-01 PROCEDURE — 85730 THROMBOPLASTIN TIME PARTIAL: CPT | Performed by: STUDENT IN AN ORGANIZED HEALTH CARE EDUCATION/TRAINING PROGRAM

## 2024-11-01 PROCEDURE — C1747 URETEROSCOPE DIGITAL FLEX SNGL USE RVS DEFLECTION APTRA: HCPCS | Performed by: UROLOGY

## 2024-11-01 PROCEDURE — NC001 PR NO CHARGE: Performed by: INTERNAL MEDICINE

## 2024-11-01 PROCEDURE — C1894 INTRO/SHEATH, NON-LASER: HCPCS | Performed by: UROLOGY

## 2024-11-01 DEVICE — INLAY OPTIMA URETERAL STENT W/O GUIDEWIRE
Type: IMPLANTABLE DEVICE | Site: URETER | Status: FUNCTIONAL
Brand: BARD® INLAY OPTIMA® URETERAL STENT

## 2024-11-01 RX ORDER — ONDANSETRON 2 MG/ML
INJECTION INTRAMUSCULAR; INTRAVENOUS AS NEEDED
Status: DISCONTINUED | OUTPATIENT
Start: 2024-11-01 | End: 2024-11-01

## 2024-11-01 RX ORDER — ACETAMINOPHEN 500 MG
500 TABLET ORAL EVERY 6 HOURS
Qty: 20 TABLET | Refills: 0 | Status: SHIPPED | OUTPATIENT
Start: 2024-11-01 | End: 2024-11-04

## 2024-11-01 RX ORDER — OXYCODONE HYDROCHLORIDE 10 MG/1
10 TABLET ORAL EVERY 4 HOURS PRN
Status: CANCELLED | OUTPATIENT
Start: 2024-11-01

## 2024-11-01 RX ORDER — ONDANSETRON 2 MG/ML
4 INJECTION INTRAMUSCULAR; INTRAVENOUS EVERY 4 HOURS PRN
Status: DISCONTINUED | OUTPATIENT
Start: 2024-11-01 | End: 2024-11-01 | Stop reason: HOSPADM

## 2024-11-01 RX ORDER — LIDOCAINE HYDROCHLORIDE 10 MG/ML
INJECTION, SOLUTION EPIDURAL; INFILTRATION; INTRACAUDAL; PERINEURAL AS NEEDED
Status: DISCONTINUED | OUTPATIENT
Start: 2024-11-01 | End: 2024-11-01

## 2024-11-01 RX ORDER — SODIUM CHLORIDE 9 MG/ML
100 INJECTION, SOLUTION INTRAVENOUS CONTINUOUS
Status: DISCONTINUED | OUTPATIENT
Start: 2024-11-01 | End: 2024-11-01

## 2024-11-01 RX ORDER — CEFTRIAXONE 1 G/50ML
1000 INJECTION, SOLUTION INTRAVENOUS EVERY 24 HOURS
Status: CANCELLED | OUTPATIENT
Start: 2024-11-01 | End: 2024-11-06

## 2024-11-01 RX ORDER — HYDROMORPHONE HCL IN WATER/PF 6 MG/30 ML
0.2 PATIENT CONTROLLED ANALGESIA SYRINGE INTRAVENOUS EVERY 4 HOURS PRN
Status: CANCELLED | OUTPATIENT
Start: 2024-11-01

## 2024-11-01 RX ORDER — DOCUSATE SODIUM 100 MG/1
100 CAPSULE, LIQUID FILLED ORAL 3 TIMES DAILY
Qty: 21 CAPSULE | Refills: 0 | Status: SHIPPED | OUTPATIENT
Start: 2024-11-01 | End: 2024-11-04

## 2024-11-01 RX ORDER — EPHEDRINE SULFATE 50 MG/ML
INJECTION INTRAVENOUS AS NEEDED
Status: DISCONTINUED | OUTPATIENT
Start: 2024-11-01 | End: 2024-11-01

## 2024-11-01 RX ORDER — ACETAMINOPHEN 325 MG/1
650 TABLET ORAL EVERY 6 HOURS PRN
Status: DISCONTINUED | OUTPATIENT
Start: 2024-11-01 | End: 2024-11-01 | Stop reason: HOSPADM

## 2024-11-01 RX ORDER — HYDROMORPHONE HCL IN WATER/PF 6 MG/30 ML
0.2 PATIENT CONTROLLED ANALGESIA SYRINGE INTRAVENOUS
Status: CANCELLED | OUTPATIENT
Start: 2024-11-01

## 2024-11-01 RX ORDER — CEFPODOXIME PROXETIL 100 MG/1
100 TABLET, FILM COATED ORAL 2 TIMES DAILY
Qty: 6 TABLET | Refills: 0 | Status: SHIPPED | OUTPATIENT
Start: 2024-11-02 | End: 2024-11-05

## 2024-11-01 RX ORDER — PANTOPRAZOLE SODIUM 40 MG/1
40 TABLET, DELAYED RELEASE ORAL
Status: DISCONTINUED | OUTPATIENT
Start: 2024-11-02 | End: 2024-11-01 | Stop reason: HOSPADM

## 2024-11-01 RX ORDER — DEXAMETHASONE SODIUM PHOSPHATE 10 MG/ML
INJECTION, SOLUTION INTRAMUSCULAR; INTRAVENOUS AS NEEDED
Status: DISCONTINUED | OUTPATIENT
Start: 2024-11-01 | End: 2024-11-01

## 2024-11-01 RX ORDER — CEFTRIAXONE 1 G/1
INJECTION, POWDER, FOR SOLUTION INTRAMUSCULAR; INTRAVENOUS AS NEEDED
Status: DISCONTINUED | OUTPATIENT
Start: 2024-11-01 | End: 2024-11-01

## 2024-11-01 RX ORDER — LEVOTHYROXINE SODIUM 125 UG/1
125 TABLET ORAL
Status: CANCELLED | OUTPATIENT
Start: 2024-11-02

## 2024-11-01 RX ORDER — HYDROMORPHONE HCL/PF 1 MG/ML
0.2 SYRINGE (ML) INJECTION EVERY 4 HOURS PRN
Status: DISCONTINUED | OUTPATIENT
Start: 2024-11-01 | End: 2024-11-01 | Stop reason: HOSPADM

## 2024-11-01 RX ORDER — LOSARTAN POTASSIUM 50 MG/1
100 TABLET ORAL DAILY
Status: CANCELLED | OUTPATIENT
Start: 2024-11-01

## 2024-11-01 RX ORDER — MAGNESIUM SULFATE HEPTAHYDRATE 40 MG/ML
2 INJECTION, SOLUTION INTRAVENOUS ONCE
Status: DISCONTINUED | OUTPATIENT
Start: 2024-11-01 | End: 2024-11-01 | Stop reason: HOSPADM

## 2024-11-01 RX ORDER — SODIUM CHLORIDE, SODIUM LACTATE, POTASSIUM CHLORIDE, CALCIUM CHLORIDE 600; 310; 30; 20 MG/100ML; MG/100ML; MG/100ML; MG/100ML
INJECTION, SOLUTION INTRAVENOUS CONTINUOUS PRN
Status: DISCONTINUED | OUTPATIENT
Start: 2024-11-01 | End: 2024-11-01

## 2024-11-01 RX ORDER — PANTOPRAZOLE SODIUM 40 MG/1
40 TABLET, DELAYED RELEASE ORAL
Status: CANCELLED | OUTPATIENT
Start: 2024-11-02

## 2024-11-01 RX ORDER — SODIUM CHLORIDE, SODIUM LACTATE, POTASSIUM CHLORIDE, CALCIUM CHLORIDE 600; 310; 30; 20 MG/100ML; MG/100ML; MG/100ML; MG/100ML
125 INJECTION, SOLUTION INTRAVENOUS CONTINUOUS
Status: DISCONTINUED | OUTPATIENT
Start: 2024-11-01 | End: 2024-11-01

## 2024-11-01 RX ORDER — ONDANSETRON 2 MG/ML
4 INJECTION INTRAMUSCULAR; INTRAVENOUS EVERY 4 HOURS PRN
Status: CANCELLED | OUTPATIENT
Start: 2024-11-01

## 2024-11-01 RX ORDER — MAGNESIUM HYDROXIDE 1200 MG/15ML
LIQUID ORAL AS NEEDED
Status: DISCONTINUED | OUTPATIENT
Start: 2024-11-01 | End: 2024-11-01 | Stop reason: HOSPADM

## 2024-11-01 RX ORDER — OXYCODONE HYDROCHLORIDE 5 MG/1
5 TABLET ORAL EVERY 6 HOURS PRN
Qty: 8 TABLET | Refills: 0 | Status: SHIPPED | OUTPATIENT
Start: 2024-11-01 | End: 2024-11-07

## 2024-11-01 RX ORDER — HYDROMORPHONE HCL/PF 1 MG/ML
SYRINGE (ML) INJECTION AS NEEDED
Status: DISCONTINUED | OUTPATIENT
Start: 2024-11-01 | End: 2024-11-01

## 2024-11-01 RX ORDER — PHENAZOPYRIDINE HYDROCHLORIDE 100 MG/1
100 TABLET, FILM COATED ORAL
Status: CANCELLED | OUTPATIENT
Start: 2024-11-01

## 2024-11-01 RX ORDER — FENTANYL CITRATE 50 UG/ML
INJECTION, SOLUTION INTRAMUSCULAR; INTRAVENOUS AS NEEDED
Status: DISCONTINUED | OUTPATIENT
Start: 2024-11-01 | End: 2024-11-01

## 2024-11-01 RX ORDER — HYDROMORPHONE HCL/PF 1 MG/ML
0.5 SYRINGE (ML) INJECTION
Status: DISCONTINUED | OUTPATIENT
Start: 2024-11-01 | End: 2024-11-01 | Stop reason: HOSPADM

## 2024-11-01 RX ORDER — MIDAZOLAM HYDROCHLORIDE 2 MG/2ML
INJECTION, SOLUTION INTRAMUSCULAR; INTRAVENOUS AS NEEDED
Status: DISCONTINUED | OUTPATIENT
Start: 2024-11-01 | End: 2024-11-01

## 2024-11-01 RX ORDER — SODIUM CHLORIDE, SODIUM GLUCONATE, SODIUM ACETATE, POTASSIUM CHLORIDE, MAGNESIUM CHLORIDE, SODIUM PHOSPHATE, DIBASIC, AND POTASSIUM PHOSPHATE .53; .5; .37; .037; .03; .012; .00082 G/100ML; G/100ML; G/100ML; G/100ML; G/100ML; G/100ML; G/100ML
1000 INJECTION, SOLUTION INTRAVENOUS ONCE
Status: COMPLETED | OUTPATIENT
Start: 2024-11-01 | End: 2024-11-01

## 2024-11-01 RX ORDER — PROPOFOL 10 MG/ML
INJECTION, EMULSION INTRAVENOUS AS NEEDED
Status: DISCONTINUED | OUTPATIENT
Start: 2024-11-01 | End: 2024-11-01

## 2024-11-01 RX ORDER — FENTANYL CITRATE/PF 50 MCG/ML
25 SYRINGE (ML) INJECTION
Status: DISCONTINUED | OUTPATIENT
Start: 2024-11-01 | End: 2024-11-01 | Stop reason: HOSPADM

## 2024-11-01 RX ORDER — LEVOTHYROXINE SODIUM 125 UG/1
125 TABLET ORAL
Status: DISCONTINUED | OUTPATIENT
Start: 2024-11-02 | End: 2024-11-01 | Stop reason: HOSPADM

## 2024-11-01 RX ORDER — TAMSULOSIN HYDROCHLORIDE 0.4 MG/1
0.4 CAPSULE ORAL
Qty: 30 CAPSULE | Refills: 0 | Status: SHIPPED | OUTPATIENT
Start: 2024-11-01

## 2024-11-01 RX ORDER — HYDROMORPHONE HCL IN WATER/PF 6 MG/30 ML
0.2 PATIENT CONTROLLED ANALGESIA SYRINGE INTRAVENOUS ONCE
Status: COMPLETED | OUTPATIENT
Start: 2024-11-01 | End: 2024-11-01

## 2024-11-01 RX ORDER — ENOXAPARIN SODIUM 100 MG/ML
40 INJECTION SUBCUTANEOUS EVERY 24 HOURS
Status: CANCELLED | OUTPATIENT
Start: 2024-11-01

## 2024-11-01 RX ORDER — EZETIMIBE 10 MG/1
10 TABLET ORAL DAILY
Status: CANCELLED | OUTPATIENT
Start: 2024-11-01

## 2024-11-01 RX ORDER — FUROSEMIDE 10 MG/ML
INJECTION INTRAMUSCULAR; INTRAVENOUS AS NEEDED
Status: DISCONTINUED | OUTPATIENT
Start: 2024-11-01 | End: 2024-11-01

## 2024-11-01 RX ORDER — ATORVASTATIN CALCIUM 10 MG/1
20 TABLET, FILM COATED ORAL
Status: CANCELLED | OUTPATIENT
Start: 2024-11-01

## 2024-11-01 RX ORDER — OXYCODONE HYDROCHLORIDE 5 MG/1
2.5 TABLET ORAL EVERY 4 HOURS PRN
Status: CANCELLED | OUTPATIENT
Start: 2024-11-01

## 2024-11-01 RX ORDER — EZETIMIBE 10 MG/1
10 TABLET ORAL DAILY
Status: DISCONTINUED | OUTPATIENT
Start: 2024-11-02 | End: 2024-11-01 | Stop reason: HOSPADM

## 2024-11-01 RX ORDER — OXYCODONE HYDROCHLORIDE 5 MG/1
5 TABLET ORAL EVERY 6 HOURS PRN
Status: CANCELLED | OUTPATIENT
Start: 2024-11-01

## 2024-11-01 RX ORDER — HYDROMORPHONE HCL IN WATER/PF 6 MG/30 ML
0.2 PATIENT CONTROLLED ANALGESIA SYRINGE INTRAVENOUS ONCE
Status: CANCELLED | OUTPATIENT
Start: 2024-11-01

## 2024-11-01 RX ORDER — ACETAMINOPHEN 10 MG/ML
1000 INJECTION, SOLUTION INTRAVENOUS EVERY 6 HOURS PRN
Status: CANCELLED | OUTPATIENT
Start: 2024-11-01

## 2024-11-01 RX ORDER — SODIUM CHLORIDE 9 MG/ML
100 INJECTION, SOLUTION INTRAVENOUS CONTINUOUS
Status: CANCELLED | OUTPATIENT
Start: 2024-11-01

## 2024-11-01 RX ORDER — HYDROMORPHONE HCL IN WATER/PF 6 MG/30 ML
0.2 PATIENT CONTROLLED ANALGESIA SYRINGE INTRAVENOUS EVERY 4 HOURS PRN
Status: DISCONTINUED | OUTPATIENT
Start: 2024-11-01 | End: 2024-11-01 | Stop reason: HOSPADM

## 2024-11-01 RX ORDER — ONDANSETRON 2 MG/ML
4 INJECTION INTRAMUSCULAR; INTRAVENOUS ONCE AS NEEDED
Status: DISCONTINUED | OUTPATIENT
Start: 2024-11-01 | End: 2024-11-01 | Stop reason: HOSPADM

## 2024-11-01 RX ORDER — ENOXAPARIN SODIUM 100 MG/ML
40 INJECTION SUBCUTANEOUS EVERY 24 HOURS
Status: DISCONTINUED | OUTPATIENT
Start: 2024-11-01 | End: 2024-11-01

## 2024-11-01 RX ORDER — ATORVASTATIN CALCIUM 20 MG/1
20 TABLET, FILM COATED ORAL
Status: DISCONTINUED | OUTPATIENT
Start: 2024-11-01 | End: 2024-11-01 | Stop reason: HOSPADM

## 2024-11-01 RX ADMIN — DEXAMETHASONE SODIUM PHOSPHATE 10 MG: 10 INJECTION, SOLUTION INTRAMUSCULAR; INTRAVENOUS at 10:42

## 2024-11-01 RX ADMIN — EPHEDRINE SULFATE 5 MG: 50 INJECTION, SOLUTION INTRAVENOUS at 11:00

## 2024-11-01 RX ADMIN — MIDAZOLAM 2 MG: 1 INJECTION INTRAMUSCULAR; INTRAVENOUS at 10:39

## 2024-11-01 RX ADMIN — HYDROMORPHONE HYDROCHLORIDE 0.2 MG: 0.2 INJECTION, SOLUTION INTRAMUSCULAR; INTRAVENOUS; SUBCUTANEOUS at 07:23

## 2024-11-01 RX ADMIN — EZETIMIBE 10 MG: 10 TABLET ORAL at 08:00

## 2024-11-01 RX ADMIN — SODIUM CHLORIDE, SODIUM GLUCONATE, SODIUM ACETATE, POTASSIUM CHLORIDE, MAGNESIUM CHLORIDE, SODIUM PHOSPHATE, DIBASIC, AND POTASSIUM PHOSPHATE 1000 ML: .53; .5; .37; .037; .03; .012; .00082 INJECTION, SOLUTION INTRAVENOUS at 13:00

## 2024-11-01 RX ADMIN — EPHEDRINE SULFATE 10 MG: 50 INJECTION, SOLUTION INTRAVENOUS at 11:13

## 2024-11-01 RX ADMIN — CEFTRIAXONE 1000 MG: 1 INJECTION, POWDER, FOR SOLUTION INTRAMUSCULAR; INTRAVENOUS at 10:52

## 2024-11-01 RX ADMIN — ONDANSETRON 4 MG: 2 INJECTION INTRAMUSCULAR; INTRAVENOUS at 10:42

## 2024-11-01 RX ADMIN — HYDROMORPHONE HYDROCHLORIDE 1 MG: 1 INJECTION, SOLUTION INTRAMUSCULAR; INTRAVENOUS; SUBCUTANEOUS at 00:00

## 2024-11-01 RX ADMIN — PROPOFOL 200 MG: 10 INJECTION, EMULSION INTRAVENOUS at 10:42

## 2024-11-01 RX ADMIN — FUROSEMIDE 20 MG: 10 INJECTION, SOLUTION INTRAMUSCULAR; INTRAVENOUS at 11:30

## 2024-11-01 RX ADMIN — CEFTRIAXONE SODIUM 1000 MG: 10 INJECTION, POWDER, FOR SOLUTION INTRAVENOUS at 15:02

## 2024-11-01 RX ADMIN — LIDOCAINE HYDROCHLORIDE 50 MG: 10 INJECTION, SOLUTION EPIDURAL; INFILTRATION; INTRACAUDAL; PERINEURAL at 10:42

## 2024-11-01 RX ADMIN — FENTANYL CITRATE 50 MCG: 50 INJECTION INTRAMUSCULAR; INTRAVENOUS at 10:39

## 2024-11-01 RX ADMIN — PANTOPRAZOLE SODIUM 40 MG: 40 TABLET, DELAYED RELEASE ORAL at 05:57

## 2024-11-01 RX ADMIN — FENTANYL CITRATE 50 MCG: 50 INJECTION INTRAMUSCULAR; INTRAVENOUS at 10:42

## 2024-11-01 RX ADMIN — LEVOTHYROXINE SODIUM 125 MCG: 125 TABLET ORAL at 05:57

## 2024-11-01 RX ADMIN — MAGNESIUM SULFATE HEPTAHYDRATE 2 G: 40 INJECTION, SOLUTION INTRAVENOUS at 07:59

## 2024-11-01 RX ADMIN — SODIUM CHLORIDE 100 ML/HR: 0.9 INJECTION, SOLUTION INTRAVENOUS at 06:47

## 2024-11-01 RX ADMIN — ONDANSETRON 4 MG: 2 INJECTION INTRAMUSCULAR; INTRAVENOUS at 07:28

## 2024-11-01 RX ADMIN — HYDROMORPHONE HYDROCHLORIDE 0.5 MG: 1 INJECTION, SOLUTION INTRAMUSCULAR; INTRAVENOUS; SUBCUTANEOUS at 11:50

## 2024-11-01 RX ADMIN — ACETAMINOPHEN 650 MG: 325 TABLET ORAL at 15:52

## 2024-11-01 RX ADMIN — SODIUM CHLORIDE, SODIUM LACTATE, POTASSIUM CHLORIDE, AND CALCIUM CHLORIDE: .6; .31; .03; .02 INJECTION, SOLUTION INTRAVENOUS at 10:37

## 2024-11-01 NOTE — PLAN OF CARE
Problem: PAIN - ADULT  Goal: Verbalizes/displays adequate comfort level or baseline comfort level  Description: Interventions:  - Encourage patient to monitor pain and request assistance  - Assess pain using appropriate pain scale  - Administer analgesics based on type and severity of pain and evaluate response  - Implement non-pharmacological measures as appropriate and evaluate response  - Consider cultural and social influences on pain and pain management  - Notify physician/advanced practitioner if interventions unsuccessful or patient reports new pain  10/31/2024 2022 by Luisana Ortiz RN  Outcome: Progressing  10/31/2024 1815 by Luisana Ortiz RN  Outcome: Progressing     Problem: INFECTION - ADULT  Goal: Absence or prevention of progression during hospitalization  Description: INTERVENTIONS:  - Assess and monitor for signs and symptoms of infection  - Monitor lab/diagnostic results  - Monitor all insertion sites, i.e. indwelling lines, tubes, and drains  - Monitor endotracheal if appropriate and nasal secretions for changes in amount and color  - [place] appropriate cooling/warming therapies per order  - Administer medications as ordered  - Instruct and encourage patient and family to use good hand hygiene technique  - Identify and instruct in appropriate isolation precautions for identified infection/condition  10/31/2024 2022 by Luisana Ortiz RN  Outcome: Progressing  10/31/2024 1815 by Luisana Ortiz RN  Outcome: Progressing     Problem: DISCHARGE PLANNING  Goal: Discharge to home or other facility with appropriate resources  Description: INTERVENTIONS:  - Identify barriers to discharge w/patient and caregiver  - Arrange for needed discharge resources and transportation as appropriate  - Identify discharge learning needs (meds, wound care, etc.)  - Arrange for interpretive services to assist at discharge as needed  - Refer to Case Management Department for coordinating discharge planning  if the patient needs post-hospital services based on physician/advanced practitioner order or complex needs related to functional status, cognitive ability, or social support system  10/31/2024 2022 by Luisana Ortiz RN  Outcome: Progressing  10/31/2024 1815 by Luisana Ortiz RN  Outcome: Progressing     Problem: Knowledge Deficit  Goal: Patient/family/caregiver demonstrates understanding of disease process, treatment plan, medications, and discharge instructions  Description: Complete learning assessment and assess knowledge base.  Interventions:  - Provide teaching at level of understanding  - Provide teaching via preferred learning methods  10/31/2024 2022 by Luisana Ortiz RN  Outcome: Progressing  10/31/2024 1815 by Luisana Ortiz RN  Outcome: Progressing

## 2024-11-01 NOTE — DISCHARGE SUMMARY
INTERNAL MEDICINE RESIDENCY DISCHARGE SUMMARY     Andriy Castillo   64 y.o. male  MRN: [id number]  Room/Bed: OR POOL/OR POOL     [facility] PACU   Encounter: [id number]    Principal Problem:    Hydronephrosis with obstructing calculus  Active Problems:    ROSANA (acute kidney injury) (HCC)    Hyperlipidemia, mixed    Primary hypertension    Hypothyroidism (acquired)    Paroxysmal atrial fibrillation (HCC)      * Hydronephrosis with obstructing calculus  Assessment & Plan  Patient presenting to [facility] with RLQ and right-sided flank pain that began on 10/30.  CT A/P showing obstructing 1.1 cm calculus in the right ureteropelvic junction with mild right-sided hydronephrosis and extensive perinephric stranding.  Additionally showed nonobstructive 3-4 mm calculus in the lower pole of the right kidney.  Transferred to [facility] for stenting    Plan:  - Urology consulted, appreciate recommendations  - S/p ureteroscopy with laser lithotripsy and basket extraction as well as ureteral stent placement  - Patient to follow-up with urology in the outpatient setting for stent removal  - Discharge on cefpodoxime for 3 more days    ROSANA (acute kidney injury) (HCC)  Assessment & Plan  Creatinine of 1.58 on 11/1 (baseline 1-1.2).  ROSANA in the setting of obstructive uropathy    Plan:  - Urology intervention as mentioned above  - Plan to give 1 L IV fluids now.   - Recheck BMP on Monday 11/4 to assess resolution of ROSANA    Paroxysmal atrial fibrillation (HCC)  Assessment & Plan  Per chart review, patient has a history of paroxysmal atrial fibrillation and was considering ablation 2 years ago, but he reverted to NSR so never pursued it.    Plan:  - No intervention necessary at this time  - Follow-up outpatient    Hypothyroidism (acquired)  Assessment & Plan  Most recent TSH and free T4 were normal.  Continue PTA levothyroxine 100 mcg daily    Primary hypertension  Assessment &  Plan  Continue PTA candesartan 16 mg daily    Hyperlipidemia, mixed  Assessment & Plan  Continue PTA Lipitor 20 mg and Zetia 10 mg daily        DETAILS OF HOSPITAL COURSE     Patient is a 64-year-old male with a past medical history of hypertension, hyperlipidemia, hypothyroidism, paroxysmal atrial fibrillation, GERD, and kidney stones who presented to [facility] on 10/31 with right flank pain and right lower quadrant abdominal pain.  He was ultimately found to have obstructing 1.1 cm calculus in the right ureteropelvic junction with mild right-sided hydronephrosis and extensive perinephric stranding.  Also found to have nonobstructing 3-4 mm calculus in the lower pole of the right kidney.  He was transferred to [facility] for urology intervention.  He went to the OR the morning of 11/1 and underwent laser lithotripsy and basket stone extraction with right ureteral stent placement.  There were no complications from the procedure and urology was comfortable with discharge this afternoon.    Patient will be discharged with a 3-day course of cefpodoxime.  Of note, patient had a mild ROSANA in the setting of obstructive uropathy.  We will order a BMP and patient was advised to complete blood work on Monday 11/4.  Patient to follow-up with urology in the outpatient setting for stent removal and his PCP in 1-2 weeks.    DISCHARGE INFORMATION     PCP at Discharge: Leo Morales DO     Admitting Provider: Sigifredo Bautista MD  Admission Date: 11/1/2024    Discharge Provider: Sigifredo Bautista MD  Discharge Date: 11/1/2024    Discharge Disposition: [facility]  Discharge Condition: good  Discharge with Lines: no    Discharge Diet: regular diet  Activity Restrictions: none  Test Results Pending at Discharge: Stone analysis    Discharge Diagnoses:  Principal Problem:    Hydronephrosis with obstructing calculus  Active Problems:    ROSANA (acute kidney injury) (HCC)    Hyperlipidemia, mixed    Primary hypertension     Hypothyroidism (acquired)    Paroxysmal atrial fibrillation (HCC)  Resolved Problems:    * No resolved hospital problems. *      Consulting Providers: Urology      Diagnostic & Therapeutic Procedures Performed:  No results found.    Code Status: Level 1 - Full Code  Advance Directive & Living Will: <no information>  Power of :    POLST:      Medications:  Current Discharge Medication List        Current Discharge Medication List        START taking these medications    Details   acetaminophen (TYLENOL) 500 mg tablet Take 1 tablet (500 mg total) by mouth every 6 (six) hours for 5 days  Qty: 20 tablet, Refills: 0    Associated Diagnoses: Hydronephrosis with obstructing calculus      cefpodoxime (VANTIN) 100 mg tablet Take 1 tablet (100 mg total) by mouth 2 (two) times a day for 3 days Do not start before November 2, 2024.  Qty: 6 tablet, Refills: 0    Associated Diagnoses: Hydronephrosis with obstructing calculus      docusate sodium (COLACE) 100 mg capsule Take 1 capsule (100 mg total) by mouth 3 (three) times a day for 7 days  Qty: 21 capsule, Refills: 0    Associated Diagnoses: Hydronephrosis with obstructing calculus      oxyCODONE (Roxicodone) 5 immediate release tablet Take 1 tablet (5 mg total) by mouth every 6 (six) hours as needed for severe pain for up to 6 days Max Daily Amount: 20 mg  Qty: 8 tablet, Refills: 0    Associated Diagnoses: Hydronephrosis with obstructing calculus      tamsulosin (FLOMAX) 0.4 mg Take 1 capsule (0.4 mg total) by mouth daily with dinner  Qty: 30 capsule, Refills: 0    Associated Diagnoses: Hydronephrosis with obstructing calculus           Current Discharge Medication List        CONTINUE these medications which have NOT CHANGED    Details   atorvastatin (LIPITOR) 20 mg tablet Take 20 mg by mouth daily      candesartan (ATACAND) 16 mg tablet Take 16 mg by mouth daily      ezetimibe (ZETIA) 10 mg tablet Take 10 mg by mouth daily      !! levothyroxine 100 mcg tablet Take 125  "mcg by mouth daily      pantoprazole (PROTONIX) 40 mg tablet Take 1 tablet (40 mg total) by mouth 2 (two) times a day  Qty: 60 tablet, Refills: 0    Associated Diagnoses: Black stool      !! Synthroid 125 MCG tablet Take 125 mcg by mouth daily before breakfast Take 30 minutes prior       !! - Potential duplicate medications found. Please discuss with provider.          Allergies:  No Known Allergies    FOLLOW-UP     PCP Outpatient Follow-up:  Leo Morales DO     Consulting Providers Follow-up:  Urology    Active Issues Requiring Follow-up:   Ureteral stent    Discharge Statement:   I spent 20 minutes minutes discharging the patient. This time was spent on the day of discharge. I had direct contact with the patient on the day of discharge. Additional documentation is required if more than 30 minutes were spent on discharge.    Portions of the record may have been created with voice recognition software.  Occasional wrong word or \"sound a like\" substitutions may have occurred due to the inherent limitations of voice recognition software.  Read the chart carefully and recognize, using context, where substitutions have occurred.    ==  Kala Dyer DO  [facility]  Internal Medicine Resident PGY-2   "

## 2024-11-01 NOTE — ASSESSMENT & PLAN NOTE
-Workup  -CBC 13.5, Hb 18.5, Cr 1.33, UA large blood, with RBC. Lactic acid 1.3  -CT showed obstructing 1.1 cm calculus, mild right hydronephrosis  -x-ray showed persistent mild to moderate right pelvicaliectasis, with a filling defect seen within the excreted contrast at the right ureteropelvic junction.   -f/u urine culture, blood culture    -Treatment  -ceftriaxone, NaCl 1L bolus on admission  -plan to continue ceftriaxone, IV fluids  -urology consulted by ED on admission - recommend stent placement however there was no OR time available at this facility, thus the decision was made to transfer to [facility] for urology procedure  -monitor ROSANA - creatinine 1.33 on admission, baseline 1-1.1  -IV pain medication PRN  -NPO at midnight

## 2024-11-01 NOTE — CONSULTS
"UROLOGY CONSULTATION NOTE     Patient Identifiers: Andriy Castillo (MRN [id number])  Service Requesting Consultation: [facility] internal medicine  Service Providing Consultation:  Urology, Christopher Ordoñez MD    Date of Service: 11/1/2024  Consults    Reason for Consultation: Right ureteropelvic junction stone, 11 mm      ASSESSMENT:     64 y.o. old male with 11 mm right ureteropelvic junction stone with uncontrolled flank pain.  Marked on his right arm.    I spoke with him about the decision for surgery for ureteroscopy with laser lithotripsy.  He is aware that he may require a staged ureteroscopic intervention should cloudy urine be noted upon wire placement.  He is aware of the risk of surgery which include infection, bleeding, pain, damage to surrounding structures, need for additional procedures, risk of failure of the procedure, risk of anesthesia, risk of positioning complications including neurapraxia, chronic pain, and paralysis as well as risk of rhabdomyolysis and compartment syndrome.  Risk of deep venous thrombosis and venous thromboembolism as well as pneumonia and other potential unpredictable complications were also discussed with the patient.    .      PLAN:     Proceed to cystoscopy with right ureteroscopy and laser lithotripsy with all indicated procedures     Portions of the above record have been created with voice recognition software.  Occasional wrong word or \"sound alike\" substitution may have occurred due to the inherent limitations of voice recognition software.  Read the chart carefully and recognize, using context, where substitution may have occurred.    Thank you for allowing me to participate in this patients’ care.  Please do not hesitate to call with any additional questions.  Christopher Ordoñez MD    History of Present Illness:     Andriy Castillo is a 64 y.o. old with a history of nephrolithiasis, last undergoing ureteroscopic stone intervention many years ago.  Presents with " uncontrolled flank pain.  Desirous of stone surgery.  No fevers or chills.  No signs of infection.    No other associated symptoms, no aggravating or alleviating factors.      The following portions of the patient's history were reviewed and updated as appropriate: allergies, current medications, past family history, past medical history, past social history, past surgical history and problem list.    Past Medical, Past Surgical History:     Past Medical History:   Diagnosis Date    Disease of thyroid gland     GERD (gastroesophageal reflux disease)     Hyperlipidemia     Hypertension    :    Past Surgical History:   Procedure Laterality Date    CARPAL TUNNEL RELEASE      TONSILLECTOMY     :    Medications, Allergies:   No current facility-administered medications for this encounter.    Allergies:  No Known Allergies:    Social and Family History:   Social History:   Social History     Tobacco Use    Smoking status: Never    Smokeless tobacco: Never    Tobacco comments:     per [employer]-Former Smoker   Vaping Use    Vaping status: Never Used   Substance Use Topics    Alcohol use: Yes     Comment: socially    Drug use: No     Comment: Denied use   .    Social History     Tobacco Use   Smoking Status Never   Smokeless Tobacco Never   Tobacco Comments    per [employer]-Former Smoker       Family History:  Family History   Problem Relation Age of Onset    Thyroid disease Mother     Heart attack Father     Heart attack Paternal Grandmother     Heart disease Paternal Grandmother         Pacemaker    Heart disease Paternal Grandfather    :     Review of Systems:     General: Fever, chills, or night sweats: negative  Cardiac: Negative for chest pain.    Pulmonary: Negative for shortness of breath.  Gastrointestinal: Abdominal pain positive.  Nausea, vomiting, or diarrhea positive,  Genitourinary: See HPI above.  Patient does not have hematuria.  All other systems were queried and were negative except as listed above in  HPI and here in the ROS.    Physical Exam:   There were no vitals taken for this visit.Temp (24hrs), Av.4 °F (36.9 °C), Min:98.4 °F (36.9 °C), Max:98.5 °F (36.9 °C)  current;    No intake/output data recorded.  General: Patient is pleasant and in NAD. Awake and alert    Cardiac: Peripheral edema: negative, peripheral pulses are present and indicated a regular rate and rhythm    Pulmonary: Non-labored breathing, speaking in full sentences, no wheezing, no coughing    Abdomen: Soft, non-tender, non-distended.  No surgical scars.  No masses, tenderness, hernias noted.      Genitourinary: Positive CVA tenderness, negative suprapubic tenderness.      Neurological: Cranial nerves II-XII are intact, no sensory deficits, no neurological deficits    Musculoskeletal: Extremities are warm, ROM is intact    Psychiatric: The patient's train of thought is linear and logical, mood and affect are normal      Skin: intact    CORONADO: none    Labs:     Lab Results   Component Value Date    HGB 17.1 (H) 2024    HCT 52.8 (H) 2024    WBC 11.29 (H) 2024     2024   ]    Lab Results   Component Value Date    K 4.2 2024     2024    CO2 26 2024    BUN 13 2024    CREATININE 1.58 (H) 2024    CALCIUM 9.0 2024   ]    Imaging:   I personally reviewed the images and report of the following studies, and reviewed them with the patient:    CT Abdomen/Pelvis: right ureteral/renal pelvic stone

## 2024-11-01 NOTE — CASE MANAGEMENT
Case Management Discharge Planning Note    Patient name Andriy Castillo  Location /402-01 MRN [id number]  : 1960 Date 2024       Current Admission Date: 10/31/2024  Current Admission Diagnosis:Hydronephrosis with obstructing calculus   Patient Active Problem List    Diagnosis Date Noted Date Diagnosed    Hypomagnesemia 2024     Hydronephrosis with obstructing calculus 10/31/2024     Elevated hemoglobin (HCC) 10/31/2024     Paroxysmal atrial fibrillation (HCC) 2023     ROSANA (acute kidney injury) (HCC) 2022     Primary hypertension 2022     Hyperlipidemia, mixed 2016     Hypothyroidism (acquired) 2013       LOS (days): 0  Geometric Mean LOS (GMLOS) (days):   Days to GMLOS:     OBJECTIVE:            Current admission status: Observation   [facility]   [id number] [address]  Phone: [phone] Fax: [phone]    Primary Care Provider: Leo Morales DO    Primary Insurance: BLUE CROSS  Secondary Insurance:     DISCHARGE DETAILS:         Patient being transferred to [facility]  higher level of care. Urologic consults      [facility] arranged transport.

## 2024-11-01 NOTE — ASSESSMENT & PLAN NOTE
Patient presenting to [facility] with RLQ and right-sided flank pain that began on 10/30.  CT A/P showing obstructing 1.1 cm calculus in the right ureteropelvic junction with mild right-sided hydronephrosis and extensive perinephric stranding.  Additionally showed nonobstructive 3-4 mm calculus in the lower pole of the right kidney.  Transferred to [facility] for stenting    Plan:  - Urology consulted, appreciate recommendations  - S/p ureteroscopy with laser lithotripsy and basket extraction as well as ureteral stent placement  - Patient to follow-up with urology in the outpatient setting for stent removal  - Discharge on cefpodoxime for 3 more days

## 2024-11-01 NOTE — ASSESSMENT & PLAN NOTE
Per chart review, patient has a history of paroxysmal atrial fibrillation and was considering ablation 2 years ago, but he reverted to NSR so never pursued it.    Plan:  - No intervention necessary at this time  - Follow-up outpatient

## 2024-11-01 NOTE — ANESTHESIA PREPROCEDURE EVALUATION
Procedure:  CYSTOSCOPY URETEROSCOPY WITH LITHOTRIPSY HOLMIUM LASER, RETROGRADE PYELOGRAM AND INSERTION STENT URETERAL (Right: Bladder)    Relevant Problems   CARDIO   (+) Hyperlipidemia, mixed   (+) Paroxysmal atrial fibrillation (HCC)   (+) Primary hypertension      ENDO   (+) Hypothyroidism (acquired)      /RENAL   (+) ROSANA (acute kidney injury) (HCC)   (+) Hydronephrosis with obstructing calculus        Physical Exam    Airway    Mallampati score: II  TM Distance: >3 FB  Neck ROM: full     Dental   No notable dental hx     Cardiovascular  Cardiovascular exam normal    Pulmonary  Pulmonary exam normal     Other Findings        Anesthesia Plan  ASA Score- 2     Anesthesia Type- general with ASA Monitors.         Additional Monitors:     Airway Plan: LMA.           Plan Factors-Exercise tolerance (METS): >4 METS.    Chart reviewed. EKG reviewed.  Existing labs reviewed. Patient summary reviewed.    Patient is not a current smoker.              Induction- intravenous.    Postoperative Plan-         Informed Consent- Anesthetic plan and risks discussed with patient.  I personally reviewed this patient with the CRNA. Discussed and agreed on the Anesthesia Plan with the CRNA..

## 2024-11-01 NOTE — H&P
INTERNAL MEDICINE RESIDENCY ADMISSION H&P     Name: Andriy Castillo   Age & Sex: 64 y.o. male   MRN: [id number]  Unit/Bed#: OR POOL   Encounter: [id number]  Primary Care Provider: Leo Morales DO    Code Status: Level 1 - Full Code  Admission Status: INPATIENT   Disposition: Patient requires Med/Surg    Admit to team: SOD Team C     ASSESSMENT/PLAN     Principal Problem:    Hydronephrosis with obstructing calculus  Active Problems:    ROSANA (acute kidney injury) (HCC)    Hyperlipidemia, mixed    Primary hypertension    Hypothyroidism (acquired)    Paroxysmal atrial fibrillation (HCC)      * Hydronephrosis with obstructing calculus  Assessment & Plan  Patient presenting to [facility] with RLQ and right-sided flank pain that began on 10/30.  CT A/P showing obstructing 1.1 cm calculus in the right ureteropelvic junction with mild right-sided hydronephrosis and extensive perinephric stranding.  Additionally showed nonobstructive 3-4 mm calculus in the lower pole of the right kidney.  Transferred to [facility] for stenting    Plan:  - Urology consulted, appreciate recommendations  - Notify urology on arrival for stenting  - Continue ceftriaxone 1 g every 24 hours  - N.p.o. on arrival    ROSANA (acute kidney injury) (HCC)  Assessment & Plan  Creatinine of 1.58 on 11/1 (baseline 1-1.2).  ROSANA in the setting of obstructive uropathy    Plan:  - Urology intervention as mentioned above  - Plan to give 1 L IV fluids now. Plan to discharge this afternoon and recheck BMP on Monday    Paroxysmal atrial fibrillation (HCC)  Assessment & Plan  Per chart review, patient has a history of paroxysmal atrial fibrillation and was considering ablation 2 years ago, but he reverted to NSR so never pursued it.    Plan:  - No intervention necessary at this time  - Follow-up outpatient    Hypothyroidism (acquired)  Assessment & Plan  Most recent TSH and free T4 were normal.  Continue PTA levothyroxine 100 mcg daily    Primary  hypertension  Assessment & Plan  Continue PTA candesartan 16 mg daily    Hyperlipidemia, mixed  Assessment & Plan  Continue PTA Lipitor 20 mg and Zetia 10 mg daily        VTE Pharmacologic Prophylaxis: Heparin  VTE Mechanical Prophylaxis: sequential compression device    CHIEF COMPLAINT   Abdominal/flank pain    HISTORY OF PRESENT ILLNESS     Patient is a 64-year-old male with a past medical history of hypertension, hyperlipidemia, hypothyroidism, paroxysmal atrial fibrillation, GERD, and kidney stones who presented to [facility] on 10/31 with right flank and right lower quadrant abdominal pain.  Symptoms began the night of 10/30 and did not improve by the following morning, prompting evaluation.  In the ED, vitals were significant for /95 but otherwise stable.  Labs were notable for WBC 13.5, hemoglobin 18.5, creatinine 1.33, normal lactic acid and troponin.  CT A/P showed obstructing 1.1 cm calculus in the right ureteropelvic junction with mild right-sided hydronephrosis and extensive perinephric stranding.  Also found to have additional nonobstructive 3-4 mm calculus in the lower pole of the right kidney.  Patient was given 1 g ceftriaxone and urology was consulted, who recommended transfer to [facility] for stenting.    Patient seen and evaluated at the bedside after lithotripsy and basket extraction.  Patient feels well at this time.  Right flank and right-sided abdominal pain resolved.  Endorses some dysuria but otherwise has no acute complaints.  He would prefer to be discharged today if possible.    REVIEW OF SYSTEMS     Review of Systems   Constitutional:  Negative for chills and fever.   HENT:  Negative for ear pain and sore throat.    Eyes:  Negative for pain and visual disturbance.   Respiratory:  Negative for cough and shortness of breath.    Cardiovascular:  Negative for chest pain and palpitations.   Gastrointestinal:  Negative for abdominal pain and vomiting.   Genitourinary:   Positive for dysuria. Negative for hematuria.   Musculoskeletal:  Negative for arthralgias and back pain.   Skin:  Negative for color change and rash.   Neurological:  Negative for seizures and syncope.   All other systems reviewed and are negative.    OBJECTIVE     Vitals:    24 1206 24 1210 24 1215 24 1230   BP:  134/96  156/88   Pulse:  97 96 92   Resp:  20 20 20   Temp: 98.1 °F (36.7 °C) 98.1 °F (36.7 °C)     SpO2:  92% 94% 94%      Temperature:   Temp (24hrs), Av.3 °F (36.8 °C), Min:98.1 °F (36.7 °C), Max:98.5 °F (36.9 °C)    Temperature: 98.1 °F (36.7 °C)  Intake & Output:  I/O         10/30 0701  10/31 0700 10/31 0701   0700  07 07    IV Piggyback  2230     Total Intake(mL/kg)  2230 (22.5)     Net  +2230                  Weights:        There is no height or weight on file to calculate BMI.  Weight (last 2 days)       None          Physical Exam  Constitutional:       General: He is not in acute distress.     Appearance: Normal appearance.   HENT:      Head: Normocephalic and atraumatic.      Mouth/Throat:      Mouth: Mucous membranes are moist.      Pharynx: Oropharynx is clear.   Eyes:      General:         Right eye: No discharge.         Left eye: No discharge.      Conjunctiva/sclera: Conjunctivae normal.   Cardiovascular:      Rate and Rhythm: Normal rate and regular rhythm.      Pulses: Normal pulses.      Heart sounds: Normal heart sounds. No murmur heard.  Pulmonary:      Effort: Pulmonary effort is normal. No respiratory distress.      Breath sounds: Normal breath sounds. No wheezing, rhonchi or rales.   Abdominal:      General: Bowel sounds are normal. There is no distension.      Tenderness: There is no abdominal tenderness. There is no guarding.   Musculoskeletal:         General: No swelling.   Skin:     General: Skin is warm.      Capillary Refill: Capillary refill takes less than 2 seconds.   Neurological:      Mental Status: He is alert and  oriented to person, place, and time.       PAST MEDICAL HISTORY     Past Medical History:   Diagnosis Date    Disease of thyroid gland     GERD (gastroesophageal reflux disease)     Hyperlipidemia     Hypertension      PAST SURGICAL HISTORY     Past Surgical History:   Procedure Laterality Date    CARPAL TUNNEL RELEASE      TONSILLECTOMY       SOCIAL & FAMILY HISTORY     Social History     Substance and Sexual Activity   Alcohol Use Yes    Comment: socially       Social History     Substance and Sexual Activity   Drug Use No    Comment: Denied use     Social History     Tobacco Use   Smoking Status Never   Smokeless Tobacco Never   Tobacco Comments    per [employer]-Former Smoker     Family History   Problem Relation Age of Onset    Thyroid disease Mother     Heart attack Father     Heart attack Paternal Grandmother     Heart disease Paternal Grandmother         Pacemaker    Heart disease Paternal Grandfather      LABORATORY DATA     Labs: I have personally reviewed pertinent reports.    Results from last 7 days   Lab Units 11/01/24  0645 10/31/24  1017   WBC Thousand/uL 11.29* 13.52*   HEMOGLOBIN g/dL 17.1* 18.5*   HEMATOCRIT % 52.8* 57.2*   PLATELETS Thousands/uL 182 202   SEGS PCT % 73 79*   MONO PCT % 12 10   EOS PCT % 0 0      Results from last 7 days   Lab Units 11/01/24  0645 10/31/24  1017   POTASSIUM mmol/L 4.2 4.3   CHLORIDE mmol/L 103 101   CO2 mmol/L 26 27   BUN mg/dL 13 13   CREATININE mg/dL 1.58* 1.33*   CALCIUM mg/dL 9.0 9.5   ALK PHOS U/L 62 69   ALT U/L 16 20   AST U/L 20 22     Results from last 7 days   Lab Units 11/01/24  0645   MAGNESIUM mg/dL 1.6*     Results from last 7 days   Lab Units 11/01/24  0645   PHOSPHORUS mg/dL 2.9      Results from last 7 days   Lab Units 11/01/24  0645   INR  1.00   PTT seconds 29     Results from last 7 days   Lab Units 10/31/24  1017   LACTIC ACID mmol/L 1.3         Micro:  Lab Results   Component Value Date    BLOODCX Received in Microbiology Lab. Culture in  Progress. 10/31/2024    BLOODCX Received in Microbiology Lab. Culture in Progress. 10/31/2024     IMAGING & DIAGNOSTIC TESTS     Imaging: I have personally reviewed pertinent reports.    XR abdomen 1 view kub    Result Date: 10/31/2024  Impression: 1.  No evidence of a bowel obstruction. 2.  Persistent mild to moderate right pelvicaliectasis, with a filling defect seen within the excreted contrast at the right ureteropelvic junction. Workstation performed: [id number]     CT abdomen pelvis with contrast    Result Date: 10/31/2024  Impression: 1. Obstructing 1.1 cm calculus now present in the right ureteropelvic junction, resulting in mild right-sided hydronephrosis, with extensive perinephric stranding now noted. 2. Additional nonobstructive small 3 to 4 mm calculus involving the lower pole of the right kidney. 3. No left-sided hydronephrosis or hydroureter noted. The study was marked in EPIC for immediate notification. Workstation performed: [id number]     EKG, Pathology, and Other Studies: I have personally reviewed pertinent reports.     ALLERGIES   No Known Allergies  MEDICATIONS PRIOR TO ARRIVAL     Prior to Admission medications    Medication Sig Start Date End Date Taking? Authorizing Provider   atorvastatin (LIPITOR) 20 mg tablet Take 20 mg by mouth daily    Historical Provider, MD   candesartan (ATACAND) 16 mg tablet Take 16 mg by mouth daily    Historical Provider, MD   ezetimibe (ZETIA) 10 mg tablet Take 10 mg by mouth daily    Historical Provider, MD   levothyroxine 100 mcg tablet Take 125 mcg by mouth daily    Historical Provider, MD   pantoprazole (PROTONIX) 40 mg tablet Take 1 tablet (40 mg total) by mouth 2 (two) times a day 3/30/22   Serjio Rebolledo MD   Synthroid 125 MCG tablet Take 125 mcg by mouth daily before breakfast Take 30 minutes prior 9/5/24   Historical Provider, MD     MEDICATIONS ADMINISTERED IN LAST 24 HOURS     CURRENT MEDICATIONS     Current Facility-Administered Medications  "  Medication Dose Route Frequency Provider Last Rate    acetaminophen  650 mg Oral Q6H PRN Christopher Ordoñez MD      atorvastatin  20 mg Oral Daily With Dinner Mariza Gonzalez MD      cefTRIAXone  1,000 mg Intravenous Q24H Mariza Gonzalez MD      enoxaparin  40 mg Subcutaneous Q24H Mariza Gonzalez MD      [START ON 11/2/2024] ezetimibe  10 mg Oral Daily Mariza Gonzalez MD      fentaNYL  25 mcg Intravenous Q3 min PRN Sara Weinstein, MADELIN      HYDROmorphone  0.2 mg Intravenous Q4H PRN Mariza Gonzalez MD      HYDROmorphone  0.5 mg Intravenous Q5 Min PRN Sara Weinstein, CRNA      lactated ringers  125 mL/hr Intravenous Continuous Christopher Ordoñez MD      [START ON 11/2/2024] levothyroxine  125 mcg Oral Daily Before Breakfast Mariza Gonzalez MD      multi-electrolyte  1,000 mL Intravenous Once Kala Dyer DO      ondansetron  4 mg Intravenous Once PRN Sara eWinstein, MADELIN      ondansetron  4 mg Intravenous Q4H PRN Mariza Gonzalez MD      [START ON 11/2/2024] pantoprazole  40 mg Oral Early Morning Mariza Gonzalez MD       lactated ringers, 125 mL/hr      acetaminophen, 650 mg, Q6H PRN  fentaNYL, 25 mcg, Q3 min PRN  HYDROmorphone, 0.2 mg, Q4H PRN  HYDROmorphone, 0.5 mg, Q5 Min PRN  ondansetron, 4 mg, Once PRN  ondansetron, 4 mg, Q4H PRN        Admission Time  I spent 20 minutes admitting the patient.  This involved direct patient contact where I performed a full history and physical, reviewing previous records, and reviewing laboratory and other diagnostic studies.    Portions of the record may have been created with voice recognition software.  Occasional wrong word or \"sound a like\" substitutions may have occurred due to the inherent limitations of voice recognition software.  Read the chart carefully and recognize, using context, where substitutions have occurred.    ==  Kala Dyer DO  [facility]  Internal Medicine Residency PGY-2   "

## 2024-11-01 NOTE — OP NOTE
OPERATIVE REPORT  PATIENT NAME: Andriy Castillo    :  1960  MRN: [id number]  Pt Location: BE CYSTO ROOM 01    SURGERY DATE: 2024    Surgeons and Role:     * Christopher Ordoñez MD - Primary    Preop Diagnosis:  ROSANA (acute kidney injury) (HCC) [N17.9]  Ureterolithiasis [N20.1]    Post-Op Diagnosis Codes:     * ROSANA (acute kidney injury) (HCC) [N17.9]     * Ureterolithiasis [N20.1]    Procedure(s):  Right - CYSTOSCOPY. RIGHT URETEROSCOPY WITH LITHOTRIPSY QUANTA LASER. WITH STONE BASKET EXTRACTION. RETROGRADE PYELOGRAM WITH INTERPRATION AND INSERTION OF RIGHT URETERAL STENT    Specimen(s):  ID Type Source Tests Collected by Time Destination   A : Right Renal Stone Fragments Calculus Kidney, Right STONE ANALYSIS Christopher Ordoñez MD 2024 1154        Estimated Blood Loss:   Minimal    Drains:  * No LDAs found *    Anesthesia Type:   General    Operative Indications:  ROSANA (acute kidney injury) (HCC) [N17.9]  Ureterolithiasis [N20.1]      Operative Findings:  Large stone, broken use and fragmentation settings, multiple basket stone extraction was performed.  The patient is stone free in all visible calyces.  A 6 Bulgarian by 26 cm stent was placed without a string.  Planned dwell time of 3 to 4 weeks.      Complications:   None    Procedure and Technique:          PROCEDURES PERFORMED:  1) Cystoscopy  2) Right retrograde pyelography with fluoroscopic interpretation  3) Right ureteroscopy with laser lithotripsy and basket extraction of stone  4) Right ureteral stent placement (6F x 26cm)    SURGEON:  Christopher Ordoñez MD    ASSISTANTS:  None    NOTE:  There were no qualified teaching residents to assist with this case    ANESTHESIA: General     COMPLICATIONS:   None    ANTIBIOTICS:rocephin    INTRAOPERATIVE THROMBOEMBOLISM PROPHYLAXIS:  Pneumatic compression stockings       FINDINGS:    The Right calculus was radiopaque on plain fluoroscopy.  Retrograde pyelogram was performed on the Right side using a 5 Fr  open ended catheter.  Approximately 14 mL contrast was injected.    3. The following findings were noted: filling defect consistent with stone      INDICATIONS FOR PROCEDURE:  Andriy Castillo is an 64 y.o. old male with a Right renal calculus.  After discussing the options for treatment,  the patient elected to undergo ureteroscopy and ureteral stent placement with all indicated procedures.  We discussed the procedure in detail, the alternatives, and the risks, and they signed informed consent to proceed (these are outlined in the surgical consent form).    PROCEDURE IN DETAIL:     The patient was identified by name, date of birth, and MRN  and brought to the OR.  Antibiotic prophylaxis and DVT prophylaxis were administered as per the guidelines.  They were placed in the dorsal lithotomy position with care to pad all pressure points.  They were prepped and draped in the usual sterile fashion.  A surgical time out was performed with all in the room in agreement with the correct patient, procedure, indications, and laterality.  A 21-Cymraes rigid cystoscope was used to enter the bladder.  The bladder was inspected in its entirety and there were no lesions noted.  The ureteral orifices were identified in their normal orthotopic positions.     The Right ureteral orifice was identified and a 5 Fr open ended catheter was placed into the ureteral orifice.   A retrograde pyelogram was performed with the findings as described above.  A wire was advanced up to the kidney under fluoroscopic guidance.  Leaving this safety wire in place, the bladder was drained.      A 12/14 by 45 access sheath was placed.    The stone was encountered in the upper pole location.  The stone was not noted to be impacted.  A holmium laser fiber was passed through the ureteroscope and laser lithotripsy was commenced at settings of between 0.2 and 0.8 J and 10 and 50 hz.  The stones were fragmented to very small pieces and dust.      Once we were  satisfied that the stone was fragmented to dust, a 1.9 Khmer zero tip nitinol basket was passed through the ureteroscope.  The residual fragments were basketed out and removed. The ureteroscope was backed down the ureter under vision and there were no residual fragments and the ureter was noted to be intact with no injury and severe edema where the stone had been located.   A 6 Fr x 26 cm JJ stent was then passed up the wire  under fluoroscopic guidance into the kidney with a good curl noted in the kidney and in the bladder.  The stent string was removed. The bladder was drained.      The patient was placed back into the supine position, awakened from general anesthesia and brought to recovery room in stable condition.      ESTIMATED BLOOD LOSS:  Minimal      DRAINS:      SPECIMENS:   No specimens collected during this procedure.     IMPLANTS:   Implant Name Type Inv. Item Serial No.  Lot No. LRB No. Used Action   STENT URETERAL 6FR 26CM INLAY OPTIMA - [id number]  STENT URETERAL 6FR 26CM INLAY OPTIMA  [employer] [id number] Right 1 Implanted        COMPLICATIONS: None    DISPOSITION: PACU    PLAN:  The patient is status post ureteroscopy with laser lithotripsy and basket stone extraction.  They are admitted to medicine, medicine can determine timing of discharge either later today or tomorrow.  We will arrange for follow-up in the office for stent removal.  At that visit he can be referred to nephrology for metabolic workup and follow-up imaging can be ordered      I was present for the entire procedure. and A qualified resident physician was not available.    Patient Disposition:  PACU              SIGNATURE: Christopher Ordoñez MD  DATE: November 1, 2024  TIME: 11:59 AM

## 2024-11-01 NOTE — ASSESSMENT & PLAN NOTE
Creatinine of 1.58 on 11/1 (baseline 1-1.2).  ROSANA in the setting of obstructive uropathy    Plan:  - Urology intervention as mentioned above  - Plan to give 1 L IV fluids now.   - Recheck BMP on Monday 11/4 to assess resolution of ROSANA

## 2024-11-01 NOTE — NURSING NOTE
Patient discharged via ambulance stable accompanied by transport staff. All necessary paperwork was provided and report given RN in OR at [facility].

## 2024-11-01 NOTE — DISCHARGE SUMMARY
Discharge Summary - Hospitalist   Name: Andriy Castillo 64 y.o. male I MRN: [id number]  Unit/Bed#: 402-01 I Date of Admission: 10/31/2024   Date of Service: 11/1/2024 I Hospital Day: 0     Assessment & Plan  Hydronephrosis with obstructing calculus  -Workup  -CBC 13.5, Hb 18.5, Cr 1.33, UA large blood, with RBC. Lactic acid 1.3  -CT showed obstructing 1.1 cm calculus, mild right hydronephrosis  -x-ray showed persistent mild to moderate right pelvicaliectasis, with a filling defect seen within the excreted contrast at the right ureteropelvic junction.   -f/u urine culture, blood culture    -Treatment  -ceftriaxone, NaCl 1L bolus on admission  -plan to continue ceftriaxone, IV fluids  -urology consulted by ED on admission - recommend stent placement however there was no OR time available at this facility, thus the decision was made to transfer to [facility] for urology procedure  -monitor ROSANA - creatinine 1.33 on admission, baseline 1-1.1  -IV pain medication PRN  -NPO at midnight  Primary hypertension  -takes candesartan 16mg daily at night, c/w losartan 100mg while admitted  Hypothyroidism (acquired)  patient takes levothyroxine 125mcg and NOT 100mcg. This has been updated.   Elevated hemoglobin (HCC)  -This is possibly due to hemoconcentration from dehydration.   -outpatient workup if persists  Hypomagnesemia  -repletion     Medical Problems       Resolved Problems  Date Reviewed: 2/24/2024   None       Discharging Physician / Practitioner: Mariza Gonzalez MD  PCP: Leo Morales DO  Admission Date:   Admission Orders (From admission, onward)       Ordered        10/31/24 1510  Place in Observation  Once                          Discharge/Transfer Date: 11/01/24    Disposition:   Transfer to: [facility] for Dr CARMEL Bautista (urologist)   Reason for Transfer: need urology procedure  Accepting Provider at [facility] [place]: Dr Bautista    Consultations During Hospital Stay:  urology    Procedures Performed:  "  none    Significant Findings / Test Results:   -CBC 13.5, Hb 18.5, Cr 1.33, UA large blood, with RBC. Lactic acid 1.3  -CT showed obstructing 1.1 cm calculus, mild right hydronephrosis  -x-ray showed persistent mild to moderate right pelvicaliectasis, with a filling defect seen within the excreted contrast at the right ureteropelvic junction    CT result:   1. Obstructing 1.1 cm calculus now present in the right ureteropelvic junction, resulting in mild right-sided hydronephrosis, with extensive perinephric stranding now noted.   2. Additional nonobstructive small 3 to 4 mm calculus involving the lower pole of the right kidney.   3. No left-sided hydronephrosis or hydroureter noted.     Incidental Findings:   none     Complications:  none    Reason for Admission: kidney stone    Hospital Course:   Andriy Castillo is a 64 y.o. male with a PMH of HTN, HLD, GERD, history of recurrent kidney stones who presents with right lower abdominal pain and flank pain. CT showed obstructing 1.1 cm calculus, mild right hydronephrosis. He was treated with IV fluid and ceftriaxone. Case was reviewed with urology, who recommend transfer to [facility] on 11/1 morning for further intervention.     Please see above list of diagnoses and related plan for additional information.     Condition at Discharge: good    Discharge Day Visit / Exam:   Subjective:  Patient reports adequate pain control. Able to urinate. No nausea, no vomiting, no abdominal pain.   Vitals: Blood Pressure: 134/86 (11/01/24 0739)  Pulse: 83 (11/01/24 0739)  Temperature: 98.5 °F (36.9 °C) (10/31/24 2040)  Temp Source: Oral (10/31/24 2040)  Respirations: 20 (10/31/24 2040)  Height: 5' 10\" (177.8 cm) (10/31/24 2020)  Weight - Scale: 99.2 kg (218 lb 11.1 oz) (11/01/24 0600)  SpO2: 94 % (11/01/24 0739)  Physical Exam  Vitals and nursing note reviewed.   Constitutional:       General: He is not in acute distress.     Appearance: He is well-developed.   HENT:      " Head: Normocephalic and atraumatic.      Nose: Nose normal.   Eyes:      Extraocular Movements: Extraocular movements intact.      Conjunctiva/sclera: Conjunctivae normal.   Cardiovascular:      Rate and Rhythm: Normal rate and regular rhythm.      Heart sounds: Normal heart sounds. No murmur heard.  Pulmonary:      Effort: Pulmonary effort is normal. No respiratory distress.      Breath sounds: Normal breath sounds. No stridor. No wheezing, rhonchi or rales.   Abdominal:      Palpations: Abdomen is soft.      Tenderness: There is no abdominal tenderness.   Musculoskeletal:         General: No swelling.      Cervical back: Neck supple.      Right lower leg: No edema.      Left lower leg: No edema.   Skin:     General: Skin is warm and dry.      Capillary Refill: Capillary refill takes less than 2 seconds.   Neurological:      General: No focal deficit present.      Mental Status: He is alert. Mental status is at baseline.   Psychiatric:         Mood and Affect: Mood normal.         Behavior: Behavior normal.         Discussion with Family: Patient declined call to .      Administrative Statements   Discharge Statement:  I have spent a total time of 60 minutes in caring for this patient on the day of the visit/encounter. >30 minutes of time was spent on: Diagnostic results, Prognosis, Risks and benefits of tx options, and Instructions for management.    **Please Note: This note may have been constructed using a voice recognition system.**

## 2024-11-01 NOTE — ANESTHESIA POSTPROCEDURE EVALUATION
Post-Op Assessment Note    CV Status:  Stable  Pain Score: 0    Pain management: adequate    Multimodal analgesia used between 6 hours prior to anesthesia start to PACU discharge    Mental Status:  Alert   Hydration Status:  Stable   PONV Controlled:  None   Airway Patency:  Patent  Two or more mitigation strategies used for obstructive sleep apnea   Post Op Vitals Reviewed: Yes    No anethesia notable event occurred.    Staff: CRNA           Last Filed PACU Vitals:  Vitals Value Taken Time   Temp 98.1    Pulse 101 11/01/24 1205   /96    Resp 18    SpO2 100 % 11/01/24 1205   Vitals shown include unfiled device data.    Modified [place]:  No data recorded

## 2024-11-01 NOTE — DISCHARGE INSTR - AVS FIRST PAGE
Andriy,    Today you underwent ureteroscopy with laser lithotripsy for destruction of stones and unblocking of your kidney and ureter.  This went well and we broke up your large stone.  After surgery like this it is not uncommon to have urgency and frequency of urination along with some blood in the urine.  You may also feel some discomfort in your kidney when urinating.    Our office will call you to arrange for ureteral stent removal by way of cystoscopy and ureteral stent removal in an office setting under local anesthesia.    Please stay well-hydrated as you recover.  We have given you medications to make your recovery less bothersome.    We wish you a rapid and uneventful recovery,    Dr. Ordoñez     We are sending you home on the following antibiotic, cefpodoxime.  Please take 1 tablet twice daily for 3 days starting Saturday, 11/2  We have ordered blood work for you.  Please complete on Monday, 11/4

## 2024-11-01 NOTE — TELEPHONE ENCOUNTER
Status post ureteroscopy with laser lithotripsy and basket stone extraction, 6 [place] by 26 cm right ureteral stent.  Please arrange for cystoscopy and stent removal in 3 to 4 weeks.  At that visit he should be given intramuscular Ancef prior to stent removal.  He can be ordered for nephrology consultation and follow-up imaging at that visit as well, thank you

## 2024-11-01 NOTE — TELEPHONE ENCOUNTER
Due to address I scheduled the patient a stent removal in [place] office tentatively. Will confirm on d/c

## 2024-11-02 DIAGNOSIS — N13.2 HYDRONEPHROSIS WITH OBSTRUCTING CALCULUS: ICD-10-CM

## 2024-11-03 LAB
BACTERIA BLD CULT: NORMAL
BACTERIA BLD CULT: NORMAL

## 2024-11-04 ENCOUNTER — APPOINTMENT (OUTPATIENT)
Dept: LAB | Facility: MEDICAL CENTER | Age: 64
End: 2024-11-04
Payer: COMMERCIAL

## 2024-11-04 DIAGNOSIS — N17.9 AKI (ACUTE KIDNEY INJURY) (HCC): ICD-10-CM

## 2024-11-04 LAB
ANION GAP SERPL CALCULATED.3IONS-SCNC: 6 MMOL/L (ref 4–13)
BUN SERPL-MCNC: 18 MG/DL (ref 5–25)
CALCIUM SERPL-MCNC: 9.4 MG/DL (ref 8.4–10.2)
CHLORIDE SERPL-SCNC: 103 MMOL/L (ref 96–108)
CO2 SERPL-SCNC: 31 MMOL/L (ref 21–32)
CREAT SERPL-MCNC: 1.12 MG/DL (ref 0.6–1.3)
GFR SERPL CREATININE-BSD FRML MDRD: 68 ML/MIN/1.73SQ M
GLUCOSE SERPL-MCNC: 107 MG/DL (ref 65–140)
POTASSIUM SERPL-SCNC: 4.2 MMOL/L (ref 3.5–5.3)
SODIUM SERPL-SCNC: 140 MMOL/L (ref 135–147)

## 2024-11-04 PROCEDURE — 36415 COLL VENOUS BLD VENIPUNCTURE: CPT

## 2024-11-04 PROCEDURE — 80048 BASIC METABOLIC PNL TOTAL CA: CPT

## 2024-11-04 RX ORDER — DOCUSATE SODIUM 100 MG/1
CAPSULE, LIQUID FILLED ORAL
Qty: 21 CAPSULE | Refills: 0 | Status: SHIPPED | OUTPATIENT
Start: 2024-11-04

## 2024-11-04 RX ORDER — PSEUDOEPHED/ACETAMINOPH/DIPHEN 30MG-500MG
TABLET ORAL
Qty: 20 TABLET | Refills: 0 | Status: SHIPPED | OUTPATIENT
Start: 2024-11-04

## 2024-11-04 NOTE — ANESTHESIA POSTPROCEDURE EVALUATION
Post-Op Assessment Note    Last Filed PACU Vitals:  Vitals Value Taken Time   Temp 98.1 °F (36.7 °C) 11/01/24 1210   Pulse 84 11/01/24 1400   /84 11/01/24 1345   Resp 20 11/01/24 1400   SpO2 96 % 11/01/24 1400       Modified [place]:  No data recorded

## 2024-11-04 NOTE — TELEPHONE ENCOUNTER
Post Op Note    Andriy Castillo is a 64 y.o. male s/p CYSTOSCOPY, RIGHT URETEROSCOPY WITH LITHOTRIPSY QUANTA LASER, WITH STONE BASKET EXTRACTION, RETROGRADE PYELOGRAM WITH INTERPRATION AND INSERTION OF RIGHT URETERAL STENT (Right: Bladder)  performed 11/1/24.  Andriy Castillo was seen on call by Dr. Ordoñez and has never been seen in office.     How would you rate your pain on a scale from 1 to 10, 10 being the worst pain ever? 0  Have you had a fever? No  Do you have any difficulty urinating? No    Do you have any other questions or concerns that I can address at this time?   -Went over potential/expected post op symptoms   -discussed ER precautions  -Confirmed post op appts and locations   -Went over medication      Post poning to confirm stent removal

## 2024-11-05 LAB
BACTERIA BLD CULT: NORMAL
BACTERIA BLD CULT: NORMAL

## 2024-11-08 LAB
COLOR STONE: NORMAL
COM MFR STONE: 60 %
COMMENT-STONE3: NORMAL
COMPOSITION: NORMAL
LABORATORY COMMENT REPORT: NORMAL
PHOTO: NORMAL
SIZE STONE: NORMAL MM
SPEC SOURCE SUBJ: NORMAL
STONE ANALYSIS-IMP: NORMAL
STONE ANALYSIS-IMP: NORMAL
URATE MFR STONE: 40 %
WT STONE: 251 MG

## 2024-11-11 ENCOUNTER — CONSULT (OUTPATIENT)
Dept: HEMATOLOGY ONCOLOGY | Facility: CLINIC | Age: 64
End: 2024-11-11
Payer: COMMERCIAL

## 2024-11-11 VITALS
TEMPERATURE: 98.6 F | OXYGEN SATURATION: 99 % | BODY MASS INDEX: 30.49 KG/M2 | SYSTOLIC BLOOD PRESSURE: 132 MMHG | HEIGHT: 70 IN | WEIGHT: 213 LBS | HEART RATE: 88 BPM | DIASTOLIC BLOOD PRESSURE: 82 MMHG

## 2024-11-11 DIAGNOSIS — N13.2 HYDRONEPHROSIS WITH OBSTRUCTING CALCULUS: Primary | ICD-10-CM

## 2024-11-11 DIAGNOSIS — D58.2 ELEVATED HEMOGLOBIN (HCC): ICD-10-CM

## 2024-11-11 PROCEDURE — 99245 OFF/OP CONSLTJ NEW/EST HI 55: CPT | Performed by: INTERNAL MEDICINE

## 2024-11-11 NOTE — PROGRESS NOTES
Andriy SMALL Demetriades  1960  HEM ONC [facility] [place] [address] [facility] [address] [phone]    No chief complaint on file.          Oncology History    No history exists.       History of Present Illness:  Patient has history of recurrent nephrolithiasis.  November 1, 2024 admitted for abdominal and flank pain.  CT showed right ureteral calculus.  Spleen unremarkable.  IV fluids, antibiotics.  Underwent ureteroscopy with lithotripsy/extraction.  On admission WBC 13.5, hemoglobin 18.5, hematocrit 57.2, platelet count 202.  Differential near normal.  Prior blood work shows hematocrit generally in the 50% range although occasionally with higher values.  White count, platelets, differential have generally been normal.  Occasional mild monocytosis, 15%, absolute monocytes up to 1.5.  On admission CMP was normal except creatinine 1.3-1.5.  Resolved after discharge, 1.1.    Review of Systems   Constitutional:  Negative for chills and fever.   HENT:  Negative for nosebleeds.    Eyes:  Negative for discharge.   Respiratory:  Negative for cough and shortness of breath.    Cardiovascular:  Negative for chest pain.   Gastrointestinal:  Negative for abdominal pain, constipation and diarrhea.   Endocrine: Negative for polydipsia.   Genitourinary:  Negative for hematuria.   Musculoskeletal:  Negative for arthralgias.   Skin:  Negative for color change.   Allergic/Immunologic: Negative for immunocompromised state.   Neurological:  Negative for dizziness and headaches.   Hematological:  Negative for adenopathy.   Psychiatric/Behavioral:  Negative for agitation.        Patient Active Problem List   Diagnosis    ROSANA (acute kidney injury) (HCC)    Hyperlipidemia, mixed    Primary hypertension    Hypothyroidism (acquired)    Hydronephrosis with obstructing calculus    Elevated hemoglobin (HCC)    Paroxysmal atrial fibrillation (HCC)    Hypomagnesemia     Past Medical  History:   Diagnosis Date    Disease of thyroid gland     GERD (gastroesophageal reflux disease)     Hyperlipidemia     Hypertension      Past Surgical History:   Procedure Laterality Date    CARPAL TUNNEL RELEASE      FL RETROGRADE PYELOGRAM  11/1/2024    [place] CYSTO/URETERO W/LITHOTRIPSY &INDWELL STENT INSRT Right 11/1/2024    Procedure: CYSTOSCOPY, RIGHT URETEROSCOPY WITH LITHOTRIPSY QUANTA LASER, WITH STONE BASKET EXTRACTION, RETROGRADE PYELOGRAM WITH INTERPRATION AND INSERTION OF RIGHT URETERAL STENT;  Surgeon: Christopher Ordoñez MD;  Location: [facility];  Service: Urology    TONSILLECTOMY       Family History   Problem Relation Age of Onset    Thyroid disease Mother     Heart attack Father     Heart attack Paternal Grandmother     Heart disease Paternal Grandmother         Pacemaker    Heart disease Paternal Grandfather      Social History     Socioeconomic History    Marital status: /Civil Union     Spouse name: Not on file    Number of children: Not on file    Years of education: Not on file    Highest education level: Not on file   Occupational History    Not on file   Tobacco Use    Smoking status: Never    Smokeless tobacco: Never    Tobacco comments:     per [employer]-Former Smoker   Vaping Use    Vaping status: Never Used   Substance and Sexual Activity    Alcohol use: Yes     Comment: socially    Drug use: No     Comment: Denied use    Sexual activity: Not on file   Other Topics Concern    Not on file   Social History Narrative    Not on file     Social Determinants of Health     Financial Resource Strain: Not on file   Food Insecurity: No Food Insecurity (10/31/2024)    Nursing - Inadequate Food Risk Classification     Worried About Running Out of Food in the Last Year: Not on file     Ran Out of Food in the Last Year: Not on file     Ran Out of Food in the Last Year: 1   Transportation Needs: No Transportation Needs (10/31/2024)    Nursing - Transportation Risk Classification     Lack of  Transportation: Not on file     Lack of Transportation: 2   Physical Activity: Not on file   Stress: Not on file   Social Connections: Not on file   Intimate Partner Violence: Unknown (10/31/2024)    Nursing IPS     Feels Physically and Emotionally Safe: Not on file     Physically Hurt by Someone: Not on file     Humiliated or Emotionally Abused by Someone: Not on file     Physically Hurt by Someone: 2     Hurt or Threatened by Someone: 2   Housing Stability: Unknown (10/31/2024)    Nursing: Inadequate Housing Risk Classification     Has Housing: Not on file     Worried About Losing Housing: Not on file     Unable to Get Utilities: Not on file     Unable to Pay for Housing in the Last Year: 2     Has Housin       Current Outpatient Medications:     Acetaminophen Extra Strength 500 MG TABS, swallow 1 tablet every 6 hours for 5 days, Disp: 20 tablet, Rfl: 0    atorvastatin (LIPITOR) 20 mg tablet, Take 20 mg by mouth daily, Disp: , Rfl:     candesartan (ATACAND) 16 mg tablet, Take 16 mg by mouth daily, Disp: , Rfl:     docusate sodium (COLACE) 100 mg capsule, swallow 1 capsule three times a day for 7 days, Disp: 21 capsule, Rfl: 0    ezetimibe (ZETIA) 10 mg tablet, Take 10 mg by mouth daily, Disp: , Rfl:     levothyroxine 100 mcg tablet, Take 125 mcg by mouth daily, Disp: , Rfl:     pantoprazole (PROTONIX) 40 mg tablet, Take 1 tablet (40 mg total) by mouth 2 (two) times a day, Disp: 60 tablet, Rfl: 0    Synthroid 125 MCG tablet, Take 125 mcg by mouth daily before breakfast Take 30 minutes prior, Disp: , Rfl:     tamsulosin (FLOMAX) 0.4 mg, Take 1 capsule (0.4 mg total) by mouth daily with dinner, Disp: 30 capsule, Rfl: 0  No Known Allergies  Vitals:    24 0822   BP: 132/82   Pulse: 88   Temp: 98.6 °F (37 °C)   SpO2: 99%       Physical Exam  Constitutional:       Appearance: He is well-developed.   HENT:      Head: Normocephalic and atraumatic.      Mouth/Throat:      Mouth: Mucous membranes are moist.   Eyes:       Pupils: Pupils are equal, round, and reactive to light.   Cardiovascular:      Rate and Rhythm: Normal rate and regular rhythm.      Heart sounds: No murmur heard.  Pulmonary:      Breath sounds: Normal breath sounds. No wheezing or rales.   Abdominal:      Palpations: Abdomen is soft.      Tenderness: There is no abdominal tenderness.   Musculoskeletal:         General: No tenderness. Normal range of motion.      Cervical back: Neck supple.   Lymphadenopathy:      Cervical: No cervical adenopathy.   Skin:     Findings: No erythema or rash.   Neurological:      Mental Status: He is alert and oriented to person, place, and time.      Cranial Nerves: No cranial nerve deficit.      Deep Tendon Reflexes: Reflexes are normal and symmetric.   Psychiatric:         Behavior: Behavior normal.           Labs:  CBC, Coags, BMP, Mg, Phos     Imaging see above.   Discussion/Summary: In summary, this is a 64-year-old male with a history of erythrocytosis as outlined.  Worse during recent hospitalization although had been present, straddling the upper end of the reference range in the past.  Medicines do not include testosterone supplementation.  Spleen size normal.  Differential diagnosis includes hypoxic states, myeloproliferative disorder, contraction, statistical artifact/outlier.  He has been told that he snores frequently.  No apneas observed.  Denies daytime somnolence, headache.  Blood work is requested to evaluate JAK2, EPO.  Home sleep apnea study is requested.  Treatment recommendations to follow accordingly.  I reviewed the above with the patient.  He voiced understanding and agreement.

## 2024-11-12 ENCOUNTER — APPOINTMENT (OUTPATIENT)
Dept: LAB | Facility: MEDICAL CENTER | Age: 64
End: 2024-11-12
Payer: COMMERCIAL

## 2024-11-12 DIAGNOSIS — D58.2 ELEVATED HEMOGLOBIN (HCC): ICD-10-CM

## 2024-11-12 PROCEDURE — 82668 ASSAY OF ERYTHROPOIETIN: CPT

## 2024-11-12 PROCEDURE — 36415 COLL VENOUS BLD VENIPUNCTURE: CPT

## 2024-11-13 LAB — EPO SERPL-ACNC: 9.6 MIU/ML (ref 2.6–18.5)

## 2024-11-18 ENCOUNTER — APPOINTMENT (RX ONLY)
Dept: URBAN - NONMETROPOLITAN AREA CLINIC 4 | Facility: CLINIC | Age: 64
Setting detail: DERMATOLOGY
End: 2024-11-18

## 2024-11-18 DIAGNOSIS — L57.0 ACTINIC KERATOSIS: ICD-10-CM

## 2024-11-18 DIAGNOSIS — L57.8 OTHER SKIN CHANGES DUE TO CHRONIC EXPOSURE TO NONIONIZING RADIATION: ICD-10-CM

## 2024-11-18 DIAGNOSIS — D22 MELANOCYTIC NEVI: ICD-10-CM

## 2024-11-18 PROBLEM — D48.5 NEOPLASM OF UNCERTAIN BEHAVIOR OF SKIN: Status: ACTIVE | Noted: 2024-11-18

## 2024-11-18 PROBLEM — D22.5 MELANOCYTIC NEVI OF TRUNK: Status: ACTIVE | Noted: 2024-11-18

## 2024-11-18 PROCEDURE — ? COUNSELING

## 2024-11-18 PROCEDURE — ? PHOTO-DOCUMENTATION

## 2024-11-18 PROCEDURE — ? FULL BODY SKIN EXAM

## 2024-11-18 PROCEDURE — 11300 SHAVE SKIN LESION 0.5 CM/<: CPT

## 2024-11-18 PROCEDURE — ? SUNSCREEN RECOMMENDATIONS

## 2024-11-18 PROCEDURE — ? SHAVE REMOVAL

## 2024-11-18 PROCEDURE — 99213 OFFICE O/P EST LOW 20 MIN: CPT | Mod: 25

## 2024-11-18 PROCEDURE — ? TREATMENT REGIMEN

## 2024-11-18 ASSESSMENT — LOCATION SIMPLE DESCRIPTION DERM
LOCATION SIMPLE: RIGHT SCALP
LOCATION SIMPLE: RIGHT SHOULDER
LOCATION SIMPLE: ANTERIOR SCALP
LOCATION SIMPLE: RIGHT LOWER BACK
LOCATION SIMPLE: LEFT SHOULDER
LOCATION SIMPLE: LOWER BACK

## 2024-11-18 ASSESSMENT — LOCATION ZONE DERM
LOCATION ZONE: ARM
LOCATION ZONE: SCALP
LOCATION ZONE: TRUNK

## 2024-11-18 ASSESSMENT — LOCATION DETAILED DESCRIPTION DERM
LOCATION DETAILED: RIGHT MEDIAL FRONTAL SCALP
LOCATION DETAILED: RIGHT POSTERIOR SHOULDER
LOCATION DETAILED: MID-FRONTAL SCALP
LOCATION DETAILED: RIGHT SUPERIOR MEDIAL LOWER BACK
LOCATION DETAILED: LEFT POSTERIOR SHOULDER
LOCATION DETAILED: SUPERIOR LUMBAR SPINE

## 2024-11-18 ASSESSMENT — TOTAL NUMBER OF LESIONS: # OF LESIONS?: 0

## 2024-11-18 NOTE — PROCEDURE: SHAVE REMOVAL
Medical Necessity Information: It is in your best interest to select a reason for this procedure from the list below. All of these items fulfill various CMS LCD requirements except the new and changing color options.
Medical Necessity Clause: This procedure was medically necessary because the lesion that was treated was:
Lab: 6
Lab Facility: 3
Body Location Override (Optional - Billing Will Still Be Based On Selected Body Map Location If Applicable): Right superior medial lower back
Detail Level: Detailed
Was A Bandage Applied: Yes
Size Of Lesion In Cm (Required): 0.2
X Size Of Lesion In Cm (Optional): 0
Depth Of Shave: dermis
Biopsy Method: Dermablade
Anesthesia Type: 1% lidocaine with epinephrine
Anesthesia Volume In Cc: 1
Hemostasis: Aluminum Chloride
Wound Care: Petrolatum
Path Notes (To The Dermatopathologist): Please check margins.
Render Path Notes In Note?: No
Consent was obtained from the patient. The risks and benefits to therapy were discussed in detail. Specifically, the risks of infection, scarring, bleeding, prolonged wound healing, incomplete removal, allergy to anesthesia, nerve injury and recurrence were addressed. Prior to the procedure, the treatment site was clearly identified and confirmed by the patient. All components of Universal Protocol/PAUSE Rule completed.
Post-Care Instructions: I reviewed with the patient in detail post-care instructions. Patient is to keep the biopsy site dry overnight, and then apply bacitracin twice daily until healed. Patient may apply hydrogen peroxide soaks to remove any crusting.
Notification Instructions: Patient will be notified of pathology results. However, patient instructed to call the office if not contacted within 2 weeks.
Billing Type: Third-Party Bill

## 2024-11-20 DIAGNOSIS — N13.2 HYDRONEPHROSIS WITH OBSTRUCTING CALCULUS: ICD-10-CM

## 2024-11-20 RX ORDER — TAMSULOSIN HYDROCHLORIDE 0.4 MG/1
CAPSULE ORAL
Qty: 30 CAPSULE | Refills: 5 | Status: SHIPPED | OUTPATIENT
Start: 2024-11-20

## 2024-11-22 ENCOUNTER — TRANSCRIBE ORDERS (OUTPATIENT)
Dept: SLEEP CENTER | Facility: CLINIC | Age: 64
End: 2024-11-22

## 2024-11-22 DIAGNOSIS — D58.2 ELEVATED HEMOGLOBIN (HCC): Primary | ICD-10-CM

## 2024-11-26 ENCOUNTER — PROCEDURE VISIT (OUTPATIENT)
Dept: UROLOGY | Facility: CLINIC | Age: 64
End: 2024-11-26
Payer: COMMERCIAL

## 2024-11-26 DIAGNOSIS — N13.2 HYDRONEPHROSIS WITH OBSTRUCTING CALCULUS: Primary | ICD-10-CM

## 2024-11-26 DIAGNOSIS — Z96.0 RETAINED URETERAL STENT: ICD-10-CM

## 2024-11-26 PROCEDURE — 52310 CYSTOSCOPY AND TREATMENT: CPT

## 2024-11-26 PROCEDURE — 96372 THER/PROPH/DIAG INJ SC/IM: CPT

## 2024-11-26 RX ORDER — CEFTRIAXONE 1 G/1
1000 INJECTION, POWDER, FOR SOLUTION INTRAMUSCULAR; INTRAVENOUS ONCE
Status: COMPLETED | OUTPATIENT
Start: 2024-11-26 | End: 2024-11-26

## 2024-11-26 RX ORDER — CEFTRIAXONE 1 G/1
1000 INJECTION, POWDER, FOR SOLUTION INTRAMUSCULAR; INTRAVENOUS ONCE
Status: DISCONTINUED | OUTPATIENT
Start: 2024-11-26 | End: 2024-11-26

## 2024-11-26 RX ADMIN — CEFTRIAXONE 1000 MG: 1 INJECTION, POWDER, FOR SOLUTION INTRAMUSCULAR; INTRAVENOUS at 12:07

## 2024-11-26 NOTE — PROGRESS NOTES
11/26/2024  Andriy Castillo is a 64 y.o. male  [id number]        Diagnosis      Patient is s/p right ureteroscopy with stone extraction on 11/01/2024 with Dr. Ordoñez    Plan  Single dose IM Rocephin 1000 mg given today prior to procedure. Successful removal of right ureteral stent via cystoscopy today.  Plan for US and KUB in 8 weeks with follow-up in 3 months        Cystoscopy     Date/Time  11/26/2024 11:40 AM     Performed by  STEPH Morataya   Authorized by  STEPH Morataya     Universal Protocol:  Procedure performed by: (ROMÁN Polk)  Consent: Verbal consent obtained. Written consent obtained.  Risks and benefits: risks, benefits and alternatives were discussed  Consent given by: patient  Timeout called at: 11/26/2024 11:54 AM.  Patient understanding: patient states understanding of the procedure being performed  Patient consent: the patient's understanding of the procedure matches consent given  Procedure consent: procedure consent matches procedure scheduled  Relevant documents: relevant documents present and verified  Radiology Images displayed and confirmed. If images not available, report reviewed: imaging studies available  Patient identity confirmed: verbally with patient      Procedure Details:  Procedure type: unilateral ureteral stent    Patient tolerance: Patient tolerated the procedure well with no immediate complications    Additional Procedure Details: The patient returns to the office today to undergo cystoscopy with right stent removal. Risk and benefits of the procedure were discussed and informed consent was obtained.  The patient was placed in the modified supine position. The genitalia were prepped and draped in a sterile fashion. Viscous lidocaine was used for local anesthesia. The flexible cystoscope was passed. The bladder was inspected. The stent was identified coming from the right ureteral orifice. The stent was grasped with a flexible grasper and was then removed in its  entirety without complications. Overall the patient tolerated the procedure.    The patient was provided with a single dose IM Rocephin 1000 mg for infection prophylaxis following the procedure.    They were made aware to advise our office of any fevers greater than 101 degrees Fahrenheit, malaise, or chills.  They were advised that it is normal to have cramping pain on the ipsilateral side for a day or so after ureteral stent removal.  They are to remain well hydrated in the coming days.      Procedure Cystoscopy ureteral stent removal    There were no vitals filed for this visit.    Stent removed intact without difficulty. Reviewed post stent removal symptoms including flank pain, dysuria, and hematuria. Instructed patient to increase oral fluid intake.  Encouraged the use of NSAIDS and other prescribed pain medication as needed for discomfort. Patient instructed to call the office or report to the ER for uncontrolled pain, fever, chills, nausea or vomiting.       STEPH Morataya

## 2024-12-02 ENCOUNTER — TELEPHONE (OUTPATIENT)
Dept: HEMATOLOGY ONCOLOGY | Facility: CLINIC | Age: 64
End: 2024-12-02

## 2024-12-02 NOTE — TELEPHONE ENCOUNTER
Patient's order for a Sathish-2 has no information that I can see as far as it being approved.  I checked with pre-auth department and the re-directed me to genetic pre-authorization phone number [phone].  I called and left a message for any updates.  Left the hope line number to call back.

## 2024-12-03 ENCOUNTER — OFFICE VISIT (OUTPATIENT)
Dept: HEMATOLOGY ONCOLOGY | Facility: CLINIC | Age: 64
End: 2024-12-03
Payer: COMMERCIAL

## 2024-12-03 VITALS
BODY MASS INDEX: 30.64 KG/M2 | HEIGHT: 70 IN | OXYGEN SATURATION: 97 % | DIASTOLIC BLOOD PRESSURE: 88 MMHG | TEMPERATURE: 97.6 F | WEIGHT: 214 LBS | SYSTOLIC BLOOD PRESSURE: 154 MMHG | HEART RATE: 76 BPM

## 2024-12-03 DIAGNOSIS — D58.2 ELEVATED HEMOGLOBIN (HCC): Primary | ICD-10-CM

## 2024-12-03 DIAGNOSIS — N13.2 HYDRONEPHROSIS WITH OBSTRUCTING CALCULUS: ICD-10-CM

## 2024-12-03 DIAGNOSIS — D72.829 LEUKOCYTOSIS, UNSPECIFIED TYPE: ICD-10-CM

## 2024-12-03 PROCEDURE — 99215 OFFICE O/P EST HI 40 MIN: CPT | Performed by: INTERNAL MEDICINE

## 2024-12-03 NOTE — LETTER
December 3, 2024     Leo Morales DO  [address] [facility] [place] [address]    Patient: Andriy Castillo   YOB: 1960   Date of Visit: 12/3/2024       Dear Dr. Morales:    Thank you for referring Andriy Castillo to me for evaluation. Below are my notes for this consultation.    If you have questions, please do not hesitate to call me. I look forward to following your patient along with you.         Sincerely,        Enrique Buchanan,         CC: MD Enrique Chavez DO  12/3/2024  9:30 AM  Sign when Signing Visit  [facility] [place]  [address] [facility] [address]  [phone]  [phone]    Andriy Castillo,1960, [id number]  12/03/24    Discussion:   In summary, this is a 64-year-old male with a history of erythrocytosis and leukocytosis.  Home sleep apnea test is planned in the near future for previously observed snoring.  I encouraged him to proceed with this.  We had requested a JAK2 mutation but on consideration I think this is not necessary.  On further review of his blood counts erythrocytosis had been present only at times he entered the hospital, once with small bowel obstruction, 2022, more recently with nephrolithiasis October/November 2024.  The latter episode also demonstrated mild leukocytosis with largely normal differential.  Erythrocytosis essentially resolved once discharged.  I suspect that these episodes represent hemoconcentration due to poor p.o. intake.  The patient reports that generally he hydrates regularly although some days he can go almost a whole day without any liquid intake.  I suggested he try to be more regular about fluid intake.  Leukocytosis likely reactive, in retrospect.  He had taken testosterone supplementation about 2 years ago.  This lasted for about 6 months.  Self discontinued.  I think this probably did not contribute to his erythrocytosis as the episode in 2022 resolved  within 24 hours.  We reviewed that if further testosterone supplementation is felt beneficial, routine CBC monitoring, dose adjustment, etc. would be indicated.  I am not set up a follow-up appointment at this time but remain available if questions or problems arise in the future.    I discussed the above with the patient.  The patient  voiced understanding and agreement.  ______________________________________________________________________    No chief complaint on file.      HPI:  Oncology History    No history exists.       Interval History: Clinically stable.  ECOG-  0 - Asymptomatic    Review of Systems   Constitutional:  Negative for chills and fever.   HENT:  Negative for nosebleeds.    Eyes:  Negative for discharge.   Respiratory:  Negative for cough and shortness of breath.    Cardiovascular:  Negative for chest pain.   Gastrointestinal:  Negative for abdominal pain, constipation and diarrhea.   Endocrine: Negative for polydipsia.   Genitourinary:  Negative for hematuria.   Musculoskeletal:  Negative for arthralgias.   Skin:  Negative for color change.   Allergic/Immunologic: Negative for immunocompromised state.   Neurological:  Negative for dizziness and headaches.   Hematological:  Negative for adenopathy.   Psychiatric/Behavioral:  Negative for agitation.        Past Medical History:   Diagnosis Date   • Disease of thyroid gland    • GERD (gastroesophageal reflux disease)    • Hyperlipidemia    • Hypertension      Patient Active Problem List   Diagnosis   • ROSANA (acute kidney injury) (HCC)   • Hyperlipidemia, mixed   • Primary hypertension   • Hypothyroidism (acquired)   • Hydronephrosis with obstructing calculus   • Elevated hemoglobin (HCC)   • Paroxysmal atrial fibrillation (HCC)   • Hypomagnesemia       Current Outpatient Medications:   •  Acetaminophen Extra Strength 500 MG TABS, swallow 1 tablet every 6 hours for 5 days, Disp: 20 tablet, Rfl: 0  •  atorvastatin (LIPITOR) 20 mg tablet, Take 20 mg  "by mouth daily, Disp: , Rfl:   •  candesartan (ATACAND) 16 mg tablet, Take 16 mg by mouth daily, Disp: , Rfl:   •  docusate sodium (COLACE) 100 mg capsule, swallow 1 capsule three times a day for 7 days, Disp: 21 capsule, Rfl: 0  •  ezetimibe (ZETIA) 10 mg tablet, Take 10 mg by mouth daily, Disp: , Rfl:   •  levothyroxine 100 mcg tablet, Take 125 mcg by mouth daily, Disp: , Rfl:   •  pantoprazole (PROTONIX) 40 mg tablet, Take 1 tablet (40 mg total) by mouth 2 (two) times a day, Disp: 60 tablet, Rfl: 0  •  Synthroid 125 MCG tablet, Take 125 mcg by mouth daily before breakfast Take 30 minutes prior, Disp: , Rfl:   •  tamsulosin (FLOMAX) 0.4 mg, take 1 capsule by mouth EVERY EVENING WITH DINNER, Disp: 30 capsule, Rfl: 5  No Known Allergies  Past Surgical History:   Procedure Laterality Date   • CARPAL TUNNEL RELEASE     • FL RETROGRADE PYELOGRAM  11/01/2024   • [place] CYSTO/URETERO W/LITHOTRIPSY &INDWELL STENT INSRT Right 11/01/2024    Procedure: CYSTOSCOPY, RIGHT URETEROSCOPY WITH LITHOTRIPSY QUANTA LASER, WITH STONE BASKET EXTRACTION, RETROGRADE PYELOGRAM WITH INTERPRATION AND INSERTION OF RIGHT URETERAL STENT;  Surgeon: Christopher Ordoñze MD;  Location[facility];  Service: Urology   • TONSILLECTOMY     • UPPER GASTROINTESTINAL ENDOSCOPY       Social History    Objective:  Vitals:    12/03/24 0849   BP: 154/88   BP Location: Left arm   Patient Position: Sitting   Cuff Size: Standard   Pulse: 76   Temp: 97.6 °F (36.4 °C)   TempSrc: Temporal   SpO2: 97%   Weight: 97.1 kg (214 lb)   Height: 5' 10\" (1.778 m)     Physical Exam  Constitutional:       Appearance: He is well-developed.   HENT:      Head: Normocephalic and atraumatic.      Mouth/Throat:      Mouth: Mucous membranes are moist.   Eyes:      Pupils: Pupils are equal, round, and reactive to light.   Cardiovascular:      Rate and Rhythm: Normal rate and regular rhythm.      Heart sounds: No murmur heard.  Pulmonary:      Breath sounds: Normal breath sounds. No " wheezing or rales.   Abdominal:      Palpations: Abdomen is soft.      Tenderness: There is no abdominal tenderness.   Musculoskeletal:         General: No tenderness. Normal range of motion.      Cervical back: Neck supple.   Lymphadenopathy:      Cervical: No cervical adenopathy.   Skin:     Findings: No erythema or rash.   Neurological:      Mental Status: He is alert and oriented to person, place, and time.      Cranial Nerves: No cranial nerve deficit.      Deep Tendon Reflexes: Reflexes are normal and symmetric.   Psychiatric:         Behavior: Behavior normal.           Labs:  I personally reviewed the labs and imaging pertinent to this patient care.

## 2024-12-03 NOTE — PROGRESS NOTES
[facility] [place]  [address] [facility] [address]  [phone]  [phone]    Andriy Castillo,1960, [id number]  12/03/24    Discussion:   In summary, this is a 64-year-old male with a history of erythrocytosis and leukocytosis.  Home sleep apnea test is planned in the near future for previously observed snoring.  I encouraged him to proceed with this.  We had requested a JAK2 mutation but on consideration I think this is not necessary.  On further review of his blood counts erythrocytosis had been present only at times he entered the hospital, once with small bowel obstruction, 2022, more recently with nephrolithiasis October/November 2024.  The latter episode also demonstrated mild leukocytosis with largely normal differential.  Erythrocytosis essentially resolved once discharged.  I suspect that these episodes represent hemoconcentration due to poor p.o. intake.  The patient reports that generally he hydrates regularly although some days he can go almost a whole day without any liquid intake.  I suggested he try to be more regular about fluid intake.  Leukocytosis likely reactive, in retrospect.  He had taken testosterone supplementation about 2 years ago.  This lasted for about 6 months.  Self discontinued.  I think this probably did not contribute to his erythrocytosis as the episode in 2022 resolved within 24 hours.  We reviewed that if further testosterone supplementation is felt beneficial, routine CBC monitoring, dose adjustment, etc. would be indicated.  I am not set up a follow-up appointment at this time but remain available if questions or problems arise in the future.    I discussed the above with the patient.  The patient  voiced understanding and agreement.  ______________________________________________________________________    No chief complaint on file.      HPI:  Oncology History    No history exists.       Interval History: Clinically  stable.  ECOG-  0 - Asymptomatic    Review of Systems   Constitutional:  Negative for chills and fever.   HENT:  Negative for nosebleeds.    Eyes:  Negative for discharge.   Respiratory:  Negative for cough and shortness of breath.    Cardiovascular:  Negative for chest pain.   Gastrointestinal:  Negative for abdominal pain, constipation and diarrhea.   Endocrine: Negative for polydipsia.   Genitourinary:  Negative for hematuria.   Musculoskeletal:  Negative for arthralgias.   Skin:  Negative for color change.   Allergic/Immunologic: Negative for immunocompromised state.   Neurological:  Negative for dizziness and headaches.   Hematological:  Negative for adenopathy.   Psychiatric/Behavioral:  Negative for agitation.        Past Medical History:   Diagnosis Date    Disease of thyroid gland     GERD (gastroesophageal reflux disease)     Hyperlipidemia     Hypertension      Patient Active Problem List   Diagnosis    ROSANA (acute kidney injury) (HCC)    Hyperlipidemia, mixed    Primary hypertension    Hypothyroidism (acquired)    Hydronephrosis with obstructing calculus    Elevated hemoglobin (HCC)    Paroxysmal atrial fibrillation (HCC)    Hypomagnesemia       Current Outpatient Medications:     Acetaminophen Extra Strength 500 MG TABS, swallow 1 tablet every 6 hours for 5 days, Disp: 20 tablet, Rfl: 0    atorvastatin (LIPITOR) 20 mg tablet, Take 20 mg by mouth daily, Disp: , Rfl:     candesartan (ATACAND) 16 mg tablet, Take 16 mg by mouth daily, Disp: , Rfl:     docusate sodium (COLACE) 100 mg capsule, swallow 1 capsule three times a day for 7 days, Disp: 21 capsule, Rfl: 0    ezetimibe (ZETIA) 10 mg tablet, Take 10 mg by mouth daily, Disp: , Rfl:     levothyroxine 100 mcg tablet, Take 125 mcg by mouth daily, Disp: , Rfl:     pantoprazole (PROTONIX) 40 mg tablet, Take 1 tablet (40 mg total) by mouth 2 (two) times a day, Disp: 60 tablet, Rfl: 0    Synthroid 125 MCG tablet, Take 125 mcg by mouth daily before breakfast  "Take 30 minutes prior, Disp: , Rfl:     tamsulosin (FLOMAX) 0.4 mg, take 1 capsule by mouth EVERY EVENING WITH DINNER, Disp: 30 capsule, Rfl: 5  No Known Allergies  Past Surgical History:   Procedure Laterality Date    CARPAL TUNNEL RELEASE      FL RETROGRADE PYELOGRAM  11/01/2024    [place] CYSTO/URETERO W/LITHOTRIPSY &INDWELL STENT INSRT Right 11/01/2024    Procedure: CYSTOSCOPY, RIGHT URETEROSCOPY WITH LITHOTRIPSY QUANTA LASER, WITH STONE BASKET EXTRACTION, RETROGRADE PYELOGRAM WITH INTERPRATION AND INSERTION OF RIGHT URETERAL STENT;  Surgeon: Christopher Ordoñez MD;  [facility];  Service: Urology    TONSILLECTOMY      UPPER GASTROINTESTINAL ENDOSCOPY       Social History     Objective:  Vitals:    12/03/24 0849   BP: 154/88   BP Location: Left arm   Patient Position: Sitting   Cuff Size: Standard   Pulse: 76   Temp: 97.6 °F (36.4 °C)   TempSrc: Temporal   SpO2: 97%   Weight: 97.1 kg (214 lb)   Height: 5' 10\" (1.778 m)     Physical Exam  Constitutional:       Appearance: He is well-developed.   HENT:      Head: Normocephalic and atraumatic.      Mouth/Throat:      Mouth: Mucous membranes are moist.   Eyes:      Pupils: Pupils are equal, round, and reactive to light.   Cardiovascular:      Rate and Rhythm: Normal rate and regular rhythm.      Heart sounds: No murmur heard.  Pulmonary:      Breath sounds: Normal breath sounds. No wheezing or rales.   Abdominal:      Palpations: Abdomen is soft.      Tenderness: There is no abdominal tenderness.   Musculoskeletal:         General: No tenderness. Normal range of motion.      Cervical back: Neck supple.   Lymphadenopathy:      Cervical: No cervical adenopathy.   Skin:     Findings: No erythema or rash.   Neurological:      Mental Status: He is alert and oriented to person, place, and time.      Cranial Nerves: No cranial nerve deficit.      Deep Tendon Reflexes: Reflexes are normal and symmetric.   Psychiatric:         Behavior: Behavior normal.           Labs:  I " personally reviewed the labs and imaging pertinent to this patient care.

## 2024-12-03 NOTE — LETTER
December 3, 2024     Leo Morales, DO  [address] [facility] [address]    Patient: Andriy Castilol   YOB: 1960   Date of Visit: 12/3/2024       Dear Dr. Morales:    Thank you for referring Andriy Castillo to me for evaluation. Below are my notes for this consultation.    If you have questions, please do not hesitate to call me. I look forward to following your patient along with you.         Sincerely,        Enrique Buchanan,         CC: MD Enrique Chavez DO  12/3/2024  9:25 AM  Incomplete  [facility] [place]  [address] [facility] [address]  [phone]  [phone]    Andriy Castillo,1960, [id number]  12/03/24    Discussion:   In summary, this is a 64-year-old male with a history of erythrocytosis and leukocytosis.      I discussed the above with the patient.  The patient *** voiced understanding and agreement.  ______________________________________________________________________    No chief complaint on file.      HPI:  Oncology History    No history exists.       Interval History:  ***  {performance status:47223}    Review of Systems   Constitutional:  Negative for chills and fever.   HENT:  Negative for nosebleeds.    Eyes:  Negative for discharge.   Respiratory:  Negative for cough and shortness of breath.    Cardiovascular:  Negative for chest pain.   Gastrointestinal:  Negative for abdominal pain, constipation and diarrhea.   Endocrine: Negative for polydipsia.   Genitourinary:  Negative for hematuria.   Musculoskeletal:  Negative for arthralgias.   Skin:  Negative for color change.   Allergic/Immunologic: Negative for immunocompromised state.   Neurological:  Negative for dizziness and headaches.   Hematological:  Negative for adenopathy.   Psychiatric/Behavioral:  Negative for agitation.        Past Medical History:   Diagnosis Date    Disease of thyroid gland     GERD (gastroesophageal reflux  disease)     Hyperlipidemia     Hypertension      Patient Active Problem List   Diagnosis    ROSANA (acute kidney injury) (HCC)    Hyperlipidemia, mixed    Primary hypertension    Hypothyroidism (acquired)    Hydronephrosis with obstructing calculus    Elevated hemoglobin (HCC)    Paroxysmal atrial fibrillation (HCC)    Hypomagnesemia       Current Outpatient Medications:     Acetaminophen Extra Strength 500 MG TABS, swallow 1 tablet every 6 hours for 5 days, Disp: 20 tablet, Rfl: 0    atorvastatin (LIPITOR) 20 mg tablet, Take 20 mg by mouth daily, Disp: , Rfl:     candesartan (ATACAND) 16 mg tablet, Take 16 mg by mouth daily, Disp: , Rfl:     docusate sodium (COLACE) 100 mg capsule, swallow 1 capsule three times a day for 7 days, Disp: 21 capsule, Rfl: 0    ezetimibe (ZETIA) 10 mg tablet, Take 10 mg by mouth daily, Disp: , Rfl:     levothyroxine 100 mcg tablet, Take 125 mcg by mouth daily, Disp: , Rfl:     pantoprazole (PROTONIX) 40 mg tablet, Take 1 tablet (40 mg total) by mouth 2 (two) times a day, Disp: 60 tablet, Rfl: 0    Synthroid 125 MCG tablet, Take 125 mcg by mouth daily before breakfast Take 30 minutes prior, Disp: , Rfl:     tamsulosin (FLOMAX) 0.4 mg, take 1 capsule by mouth EVERY EVENING WITH DINNER, Disp: 30 capsule, Rfl: 5  No Known Allergies  Past Surgical History:   Procedure Laterality Date    CARPAL TUNNEL RELEASE      FL RETROGRADE PYELOGRAM  11/01/2024    [place] CYSTO/URETERO W/LITHOTRIPSY &INDWELL STENT INSRT Right 11/01/2024    Procedure: CYSTOSCOPY, RIGHT URETEROSCOPY WITH LITHOTRIPSY QUANTA LASER, WITH STONE BASKET EXTRACTION, RETROGRADE PYELOGRAM WITH INTERPRATION AND INSERTION OF RIGHT URETERAL STENT;  Surgeon: Christopher Ordoñez MD;  [facility];  Service: Urology    TONSILLECTOMY      UPPER GASTROINTESTINAL ENDOSCOPY       Social History    Objective:  Vitals:    12/03/24 0849   BP: 154/88   BP Location: Left arm   Patient Position: Sitting   Cuff Size: Standard   Pulse: 76   Temp: 97.6  "°F (36.4 °C)   TempSrc: Temporal   SpO2: 97%   Weight: 97.1 kg (214 lb)   Height: 5' 10\" (1.778 m)     Physical Exam  Constitutional:       Appearance: He is well-developed.   HENT:      Head: Normocephalic and atraumatic.      Mouth/Throat:      Mouth: Mucous membranes are moist.   Eyes:      Pupils: Pupils are equal, round, and reactive to light.   Cardiovascular:      Rate and Rhythm: Normal rate and regular rhythm.      Heart sounds: No murmur heard.  Pulmonary:      Breath sounds: Normal breath sounds. No wheezing or rales.   Abdominal:      Palpations: Abdomen is soft.      Tenderness: There is no abdominal tenderness.   Musculoskeletal:         General: No tenderness. Normal range of motion.      Cervical back: Neck supple.   Lymphadenopathy:      Cervical: No cervical adenopathy.   Skin:     Findings: No erythema or rash.   Neurological:      Mental Status: He is alert and oriented to person, place, and time.      Cranial Nerves: No cranial nerve deficit.      Deep Tendon Reflexes: Reflexes are normal and symmetric.   Psychiatric:         Behavior: Behavior normal.           Labs:  I personally reviewed the labs and imaging pertinent to this patient care.    Enrique Buchanan DO  12/3/2024  9:03 AM  Sign when Signing Visit  [facility]ATOLOG[facility] [address] [address]  [phone]  [phone]    Andriy Castillo,1960, [id number]  12/03/24    Discussion:   ***    I discussed the above with the patient.  The patient *** voiced understanding and agreement.  ______________________________________________________________________    No chief complaint on file.      HPI:  Oncology History    No history exists.       Interval History:  ***  {performance status:99497}    Review of Systems   Constitutional:  Negative for chills and fever.   HENT:  Negative for nosebleeds.    Eyes:  Negative for discharge.   Respiratory:  Negative for cough and shortness of breath.  "   Cardiovascular:  Negative for chest pain.   Gastrointestinal:  Negative for abdominal pain, constipation and diarrhea.   Endocrine: Negative for polydipsia.   Genitourinary:  Negative for hematuria.   Musculoskeletal:  Negative for arthralgias.   Skin:  Negative for color change.   Allergic/Immunologic: Negative for immunocompromised state.   Neurological:  Negative for dizziness and headaches.   Hematological:  Negative for adenopathy.   Psychiatric/Behavioral:  Negative for agitation.        Past Medical History:   Diagnosis Date    Disease of thyroid gland     GERD (gastroesophageal reflux disease)     Hyperlipidemia     Hypertension      Patient Active Problem List   Diagnosis    ROSANA (acute kidney injury) (HCC)    Hyperlipidemia, mixed    Primary hypertension    Hypothyroidism (acquired)    Hydronephrosis with obstructing calculus    Elevated hemoglobin (HCC)    Paroxysmal atrial fibrillation (HCC)    Hypomagnesemia       Current Outpatient Medications:     Acetaminophen Extra Strength 500 MG TABS, swallow 1 tablet every 6 hours for 5 days, Disp: 20 tablet, Rfl: 0    atorvastatin (LIPITOR) 20 mg tablet, Take 20 mg by mouth daily, Disp: , Rfl:     candesartan (ATACAND) 16 mg tablet, Take 16 mg by mouth daily, Disp: , Rfl:     docusate sodium (COLACE) 100 mg capsule, swallow 1 capsule three times a day for 7 days, Disp: 21 capsule, Rfl: 0    ezetimibe (ZETIA) 10 mg tablet, Take 10 mg by mouth daily, Disp: , Rfl:     levothyroxine 100 mcg tablet, Take 125 mcg by mouth daily, Disp: , Rfl:     pantoprazole (PROTONIX) 40 mg tablet, Take 1 tablet (40 mg total) by mouth 2 (two) times a day, Disp: 60 tablet, Rfl: 0    Synthroid 125 MCG tablet, Take 125 mcg by mouth daily before breakfast Take 30 minutes prior, Disp: , Rfl:     tamsulosin (FLOMAX) 0.4 mg, take 1 capsule by mouth EVERY EVENING WITH DINNER, Disp: 30 capsule, Rfl: 5  No Known Allergies  Past Surgical History:   Procedure Laterality Date    CARPAL TUNNEL  "RELEASE      FL RETROGRADE PYELOGRAM  11/01/2024    [place] CYSTO/URETERO W/LITHOTRIPSY &INDWELL STENT INSRT Right 11/01/2024    Procedure: CYSTOSCOPY, RIGHT URETEROSCOPY WITH LITHOTRIPSY QUANTA LASER, WITH STONE BASKET EXTRACTION, RETROGRADE PYELOGRAM WITH INTERPRATION AND INSERTION OF RIGHT URETERAL STENT;  Surgeon: Christopher Ordoñez MD;  [facility];  Service: Urology    TONSILLECTOMY      UPPER GASTROINTESTINAL ENDOSCOPY       Social History    Objective:  Vitals:    12/03/24 0849   BP: 154/88   BP Location: Left arm   Patient Position: Sitting   Cuff Size: Standard   Pulse: 76   Temp: 97.6 °F (36.4 °C)   TempSrc: Temporal   SpO2: 97%   Weight: 97.1 kg (214 lb)   Height: 5' 10\" (1.778 m)     Physical Exam  Constitutional:       Appearance: He is well-developed.   HENT:      Head: Normocephalic and atraumatic.      Mouth/Throat:      Mouth: Mucous membranes are moist.   Eyes:      Pupils: Pupils are equal, round, and reactive to light.   Cardiovascular:      Rate and Rhythm: Normal rate and regular rhythm.      Heart sounds: No murmur heard.  Pulmonary:      Breath sounds: Normal breath sounds. No wheezing or rales.   Abdominal:      Palpations: Abdomen is soft.      Tenderness: There is no abdominal tenderness.   Musculoskeletal:         General: No tenderness. Normal range of motion.      Cervical back: Neck supple.   Lymphadenopathy:      Cervical: No cervical adenopathy.   Skin:     Findings: No erythema or rash.   Neurological:      Mental Status: He is alert and oriented to person, place, and time.      Cranial Nerves: No cranial nerve deficit.      Deep Tendon Reflexes: Reflexes are normal and symmetric.   Psychiatric:         Behavior: Behavior normal.           Labs:  I personally reviewed the labs and imaging pertinent to this patient care.  "

## 2024-12-11 ENCOUNTER — HOSPITAL ENCOUNTER (OUTPATIENT)
Dept: SLEEP CENTER | Facility: HOSPITAL | Age: 64
Discharge: HOME | End: 2024-12-11
Attending: INTERNAL MEDICINE
Payer: COMMERCIAL

## 2024-12-11 DIAGNOSIS — D58.2 ELEVATED HEMOGLOBIN (HCC): ICD-10-CM

## 2024-12-11 PROCEDURE — G0399 HOME SLEEP TEST/TYPE 3 PORTA: HCPCS

## 2024-12-11 NOTE — PROGRESS NOTES
Home Sleep Study Documentation    HOME STUDY DEVICE: Noxturnal no                                           Landy G3 yes device # 11      Pre-Sleep Home Study:    Set-up and instructions performed by: Sadaf    Technician performed demonstration for Patient: yes    Return demonstration performed by Patient: yes    Written instructions provided to Patient: yes    Patient signed consent form: yes        Post-Sleep Home Study:    Additional comments by Patient: None    Home Sleep Study Failed:no:    Failure reason: N/A    Reported or Detected: N/A    Scored by: RAJINDER Fink

## 2024-12-13 PROBLEM — G47.33 OSA (OBSTRUCTIVE SLEEP APNEA): Status: ACTIVE | Noted: 2024-12-13

## 2024-12-13 PROBLEM — G47.34 SLEEP RELATED HYPOXIA: Status: ACTIVE | Noted: 2024-12-13

## 2024-12-13 PROCEDURE — G0399 HOME SLEEP TEST/TYPE 3 PORTA: HCPCS | Performed by: INTERNAL MEDICINE

## 2024-12-24 ENCOUNTER — TELEPHONE (OUTPATIENT)
Dept: SLEEP CENTER | Facility: CLINIC | Age: 64
End: 2024-12-24

## 2024-12-24 NOTE — TELEPHONE ENCOUNTER
Sleep study resulted and shows moderate to severe sleep apnea with severe hypoxia.  Patient viewed results via [employer].      Follow up to be provided by ordering physician, Dr. Lan, hematology/oncology.    Called patient and left message advising sleep study is resulted and to contact Dr. Buchanan to discuss next steps.  If Dr. Buchanan requests follow up with sleep medicine, he would need to place a referral for a sleep consult.  Advised I will send a [employer] message with this information. Provided sleep center number([phone]) to call if any questions.

## 2024-12-27 DIAGNOSIS — G47.30 SLEEP APNEA IN ADULT: Primary | ICD-10-CM

## 2025-01-20 ENCOUNTER — TELEMEDICINE (OUTPATIENT)
Dept: HEMATOLOGY ONCOLOGY | Facility: CLINIC | Age: 65
End: 2025-01-20
Payer: COMMERCIAL

## 2025-01-20 DIAGNOSIS — D58.2 ELEVATED HEMOGLOBIN (HCC): Primary | ICD-10-CM

## 2025-01-20 DIAGNOSIS — G47.33 OSA (OBSTRUCTIVE SLEEP APNEA): ICD-10-CM

## 2025-01-20 DIAGNOSIS — D72.829 LEUKOCYTOSIS, UNSPECIFIED TYPE: ICD-10-CM

## 2025-01-20 PROCEDURE — 99214 OFFICE O/P EST MOD 30 MIN: CPT | Performed by: INTERNAL MEDICINE

## 2025-01-20 NOTE — ASSESSMENT & PLAN NOTE
Sleep lab study showed moderate to severe obstructive sleep apnea and severe hypoxia out of proportion to VÍCTOR.  To evaluate further, pulmonary consultation is requested.  We reviewed that this is important to determine relevance of non-NORAH related hypoxic events and develop treatment plan for both of these problems simultaneously.    Patient voiced understanding and agreement.  I made arrangements accordingly.  I am not set up follow-up appointment but remain available if questions or problems arise in the future.  Orders:    Ambulatory Referral to Pulmonology; Future

## 2025-01-20 NOTE — LETTER
2025     Leo Morales DO  [address] [facility] [place] [address]    Patient: Andriy Castillo   YOB: 1960   Date of Visit: 2025       Dear Dr. Morales:    Thank you for referring Andriy Castillo to me for evaluation. Below are my notes for this consultation.    If you have questions, please do not hesitate to call me. I look forward to following your patient along with you.         Sincerely,        Enrique Buchanan DO        CC: No Recipients    Enrique Buchanan DO  2025 10:08 AM  Sign when Signing Visit  Virtual Regular Visit  Name: Andriy Castillo      : 1960      MRN: [id number]  Encounter Provider: Enrique Buchanan DO  Encounter Date: 2025   Encounter department: [facility]ATOLOG[facility] [place]      Verification of patient location:  Patient is located at Home in the following state in which I hold an active license [place] :  Assessment & Plan  Elevated hemoglobin (HCC)  Only noted during hospitalizations.  Probably represents hemoconcentration.       Leukocytosis, unspecified type  Transient.  Likely stress related.  Asymptomatic.  Monitor.       NORAH (obstructive sleep apnea)  Sleep lab study showed moderate to severe obstructive sleep apnea and severe hypoxia out of proportion to VÍCTOR.  To evaluate further, pulmonary consultation is requested.  We reviewed that this is important to determine relevance of non-NORAH related hypoxic events and develop treatment plan for both of these problems simultaneously.    Patient voiced understanding and agreement.  I made arrangements accordingly.  I am not set up follow-up appointment but remain available if questions or problems arise in the future.  Orders:  •  Ambulatory Referral to Pulmonology; Future        Encounter provider Enrique Buchanan DO    The patient was identified by name and date of birth. Andriy Castillo was informed that this is a telemedicine visit and that  the visit is being conducted through the Epic Embedded platform. He agrees to proceed..  My office door was closed. No one else was in the room.  He acknowledged consent and understanding of privacy and security of the video platform. The patient has agreed to participate and understands they can discontinue the visit at any time.    Patient is aware this is a billable service.     History of Present Illness    HPI patient was noted to have erythrocytosis.  Most notable during hospitalizations, presumed secondary to hemoconcentration, temporary.  Most recent CBC 11/1/2024 WBC 11.2, hemoglobin 17.1, hematocrit 52.8, platelets 182, normal differential.  He was hospitalized at that time.    Review of Systems    Objective  There were no vitals taken for this visit.    Physical Exam    Visit Time  Total Visit Duration: 20 min.

## 2025-01-20 NOTE — PROGRESS NOTES
Virtual Regular Visit  Name: Andriy Castillo      : 1960      MRN: [id number]  Encounter Provider: Enrique Buchanan DO  Encounter Date: 2025   Encounter department: [facility] [place]      Verification of patient location:  Patient is located at Home in the following state in which I hold an active license [place] :  Assessment & Plan  Elevated hemoglobin (HCC)  Only noted during hospitalizations.  Probably represents hemoconcentration.       Leukocytosis, unspecified type  Transient.  Likely stress related.  Asymptomatic.  Monitor.       NORAH (obstructive sleep apnea)  Sleep lab study showed moderate to severe obstructive sleep apnea and severe hypoxia out of proportion to VÍCTOR.  To evaluate further, pulmonary consultation is requested.  We reviewed that this is important to determine relevance of non-NORAH related hypoxic events and develop treatment plan for both of these problems simultaneously.    Patient voiced understanding and agreement.  I made arrangements accordingly.  I am not set up follow-up appointment but remain available if questions or problems arise in the future.  Orders:    Ambulatory Referral to Pulmonology; Future        Encounter provider Enrique Buchanan DO    The patient was identified by name and date of birth. Andriy Castillo was informed that this is a telemedicine visit and that the visit is being conducted through the Epic Embedded platform. He agrees to proceed..  My office door was closed. No one else was in the room.  He acknowledged consent and understanding of privacy and security of the video platform. The patient has agreed to participate and understands they can discontinue the visit at any time.    Patient is aware this is a billable service.     History of Present Illness     HPI patient was noted to have erythrocytosis.  Most notable during hospitalizations, presumed secondary to hemoconcentration, temporary.  Most recent  CBC 11/1/2024 WBC 11.2, hemoglobin 17.1, hematocrit 52.8, platelets 182, normal differential.  He was hospitalized at that time.    Review of Systems    Objective   There were no vitals taken for this visit.    Physical Exam    Visit Time  Total Visit Duration: 20 min.

## 2025-01-28 ENCOUNTER — HOSPITAL ENCOUNTER (OUTPATIENT)
Dept: ULTRASOUND IMAGING | Facility: HOSPITAL | Age: 65
Discharge: HOME | End: 2025-01-28
Payer: COMMERCIAL

## 2025-01-28 DIAGNOSIS — N13.2 HYDRONEPHROSIS WITH OBSTRUCTING CALCULUS: ICD-10-CM

## 2025-01-28 DIAGNOSIS — Z96.0 RETAINED URETERAL STENT: ICD-10-CM

## 2025-01-28 PROCEDURE — 76775 US EXAM ABDO BACK WALL LIM: CPT

## 2025-02-10 ENCOUNTER — OFFICE VISIT (OUTPATIENT)
Dept: URGENT CARE | Facility: MEDICAL CENTER | Age: 65
End: 2025-02-10
Payer: COMMERCIAL

## 2025-02-10 VITALS
OXYGEN SATURATION: 95 % | TEMPERATURE: 97.6 F | BODY MASS INDEX: 32.71 KG/M2 | SYSTOLIC BLOOD PRESSURE: 144 MMHG | RESPIRATION RATE: 20 BRPM | DIASTOLIC BLOOD PRESSURE: 88 MMHG | HEART RATE: 90 BPM | WEIGHT: 228 LBS

## 2025-02-10 DIAGNOSIS — J02.9 SORE THROAT: Primary | ICD-10-CM

## 2025-02-10 LAB — S PYO AG THROAT QL: NEGATIVE

## 2025-02-10 PROCEDURE — 87880 STREP A ASSAY W/OPTIC: CPT

## 2025-02-10 PROCEDURE — 99213 OFFICE O/P EST LOW 20 MIN: CPT

## 2025-02-10 PROCEDURE — 87070 CULTURE OTHR SPECIMN AEROBIC: CPT

## 2025-02-10 RX ORDER — AMOXICILLIN 500 MG/1
500 CAPSULE ORAL EVERY 8 HOURS SCHEDULED
Qty: 30 CAPSULE | Refills: 0 | Status: SHIPPED | OUTPATIENT
Start: 2025-02-10 | End: 2025-02-20

## 2025-02-10 NOTE — PATIENT INSTRUCTIONS
Take amoxicillin as prescribed IF strep A positve, DO NOT TAKE UNLESS instructed  Boil toothbrush each night and replace after 2-3 of treatment  Fluids and rest  Salt water gargles and chloraseptic spray  Throat Coat Tea  Wash hands frequently  Don't share drinks  Tylenol/Ibuprofen for pain/fever

## 2025-02-10 NOTE — PROGRESS NOTES
[facility]        NAME: Andriy Castillo is a 64 y.o. male  : 1960    MRN: [id number]  DATE: February 10, 2025  TIME: 3:12 PM    Assessment and Plan   Sore throat [J02.9]  1. Sore throat  POCT rapid ANTIGEN strepA    amoxicillin (AMOXIL) 500 mg capsule    Throat culture    Throat culture        Patient started the going actively away abroad for over a week before definitive test results come back and we will have access to antibiotics with his insurance.  Had discussion with patient and agreeable to providing prescription for amoxicillin for patient to  and have on hand for strep should test results be positive for group A strep.  Patient understands that should his test results be negative for group A strep and the recommendation by provider who follows up with his test results to not take the abx, he will not take the antibiotic.  Expresses understanding that if it is negative that it is likely viral and antibiotics do not treat viral illnesses.    Patient Instructions     Patient Instructions   Take amoxicillin as prescribed IF strep A positve, DO NOT TAKE UNLESS instructed  Boil toothbrush each night and replace after 2-3 of treatment  Fluids and rest  Salt water gargles and chloraseptic spray  Throat Coat Tea  Wash hands frequently  Don't share drinks  Tylenol/Ibuprofen for pain/fever        Follow up with PCP in 3-5 days.  Proceed to  ER if symptoms worsen.    Chief Complaint     Chief Complaint   Patient presents with    Sore Throat     Sore throat started this am. Had nasal congestion last night. Woke up with a sore throat and couldn't swallow till 0900. Is going away on Thursday and wants to rule out strep. No n/v/d. No fevers or chills. Eating and drinking normal         History of Present Illness       64-year-old male presenting with sore throat x 7 hours.  Patient reports waking up at 530 every day and walking up this morning with a sore throat and not being able to swallow  without significant pain until 9:30 AM.  Patient reports he did have a tonsillectomy and history of strep pharyngitis and just wanted make sure that this was not the case.  Patient states he is going on vacation early morning and 3 days and will be away for a week in [facility].  Patient reports only congestion starting yesterday without any other symptoms and that a sore throat today is his only symptoms.  Denies any fevers, chills, body aches monitor vomiting or diarrhea.  Patient reports that he is around sick people for the last 2 weeks because everything going around in the community and at home however he is dealing 1 at home currently sick.    Sore Throat   Associated symptoms include congestion. Pertinent negatives include no abdominal pain, coughing, diarrhea, ear pain, headaches, shortness of breath or vomiting.       Review of Systems   Review of Systems   Constitutional:  Negative for chills, fatigue and fever.   HENT:  Positive for congestion and sore throat. Negative for ear pain and rhinorrhea.    Respiratory:  Negative for cough and shortness of breath.    Cardiovascular:  Negative for chest pain and palpitations.   Gastrointestinal:  Negative for abdominal pain, diarrhea, nausea and vomiting.   Musculoskeletal:  Negative for arthralgias and myalgias.   Neurological:  Negative for light-headedness and headaches.         Current Medications       Current Outpatient Medications:     amoxicillin (AMOXIL) 500 mg capsule, Take 1 capsule (500 mg total) by mouth every 8 (eight) hours for 10 days, Disp: 30 capsule, Rfl: 0    apixaban (ELIQUIS) 5 mg, Take 5 mg by mouth 2 (two) times a day, Disp: , Rfl:     atorvastatin (LIPITOR) 20 mg tablet, Take 20 mg by mouth daily, Disp: , Rfl:     candesartan (ATACAND) 16 mg tablet, Take 16 mg by mouth daily, Disp: , Rfl:     ezetimibe (ZETIA) 10 mg tablet, Take 10 mg by mouth daily, Disp: , Rfl:     pantoprazole (PROTONIX) 40 mg tablet, Take 1 tablet (40 mg total) by mouth 2  (two) times a day, Disp: 60 tablet, Rfl: 0    Synthroid 125 MCG tablet, Take 125 mcg by mouth daily before breakfast Take 30 minutes prior, Disp: , Rfl:     Acetaminophen Extra Strength 500 MG TABS, swallow 1 tablet every 6 hours for 5 days, Disp: 20 tablet, Rfl: 0    docusate sodium (COLACE) 100 mg capsule, swallow 1 capsule three times a day for 7 days, Disp: 21 capsule, Rfl: 0    levothyroxine 100 mcg tablet, Take 125 mcg by mouth daily, Disp: , Rfl:     tamsulosin (FLOMAX) 0.4 mg, take 1 capsule by mouth EVERY EVENING WITH DINNER (Patient not taking: Reported on 2/10/2025), Disp: 30 capsule, Rfl: 5    Current Allergies     Allergies as of 02/10/2025    (No Known Allergies)            The following portions of the patient's history were reviewed and updated as appropriate: allergies, current medications, past family history, past medical history, past social history, past surgical history and problem list.     Past Medical History:   Diagnosis Date    Disease of thyroid gland     GERD (gastroesophageal reflux disease)     Hyperlipidemia     Hypertension        Past Surgical History:   Procedure Laterality Date    CARPAL TUNNEL RELEASE      FL RETROGRADE PYELOGRAM  11/01/2024    VT CYSTO/URETERO W/LITHOTRIPSY &INDWELL STENT INSRT Right 11/01/2024    Procedure: CYSTOSCOPY, RIGHT URETEROSCOPY WITH LITHOTRIPSY QUANTA LASER, WITH STONE BASKET EXTRACTION, RETROGRADE PYELOGRAM WITH INTERPRATION AND INSERTION OF RIGHT URETERAL STENT;  Surgeon: Christopher Ordoñez MD;  Location: [facility];  Service: Urology    TONSILLECTOMY      UPPER GASTROINTESTINAL ENDOSCOPY         Family History   Problem Relation Age of Onset    Thyroid disease Mother     Heart attack Father     Heart attack Paternal Grandmother     Heart disease Paternal Grandmother         Pacemaker    Heart disease Paternal Grandfather          Medications have been verified.        Objective   /88   Pulse 90   Temp 97.6 °F (36.4 °C)   Resp 20   Wt 103 kg  (228 lb)   SpO2 95%   BMI 32.71 kg/m²        Physical Exam     Physical Exam  Vitals and nursing note reviewed.   Constitutional:       General: He is not in acute distress.     Appearance: He is normal weight. He is ill-appearing.   HENT:      Head: Normocephalic and atraumatic.      Right Ear: Tympanic membrane, ear canal and external ear normal.      Left Ear: Tympanic membrane, ear canal and external ear normal.      Nose: Nose normal. No congestion.      Mouth/Throat:      Mouth: Mucous membranes are moist.      Pharynx: Oropharynx is clear. Posterior oropharyngeal erythema present. No pharyngeal swelling or oropharyngeal exudate.      Tonsils: No tonsillar exudate or tonsillar abscesses.   Eyes:      Conjunctiva/sclera: Conjunctivae normal.      Pupils: Pupils are equal, round, and reactive to light.   Cardiovascular:      Rate and Rhythm: Normal rate and regular rhythm.      Pulses: Normal pulses.      Heart sounds: Normal heart sounds.   Pulmonary:      Effort: Pulmonary effort is normal.      Breath sounds: Normal breath sounds. No wheezing.   Abdominal:      General: Abdomen is flat. Bowel sounds are normal.      Palpations: Abdomen is soft.      Tenderness: There is no abdominal tenderness.   Lymphadenopathy:      Cervical: Cervical adenopathy present.   Skin:     General: Skin is warm and dry.      Capillary Refill: Capillary refill takes less than 2 seconds.   Neurological:      General: No focal deficit present.      Mental Status: He is alert and oriented to person, place, and time.   Psychiatric:         Mood and Affect: Mood normal.         Behavior: Behavior normal.

## 2025-02-12 LAB — BACTERIA THROAT CULT: NORMAL

## 2025-02-20 ENCOUNTER — OFFICE VISIT (OUTPATIENT)
Dept: UROLOGY | Facility: CLINIC | Age: 65
End: 2025-02-20
Payer: COMMERCIAL

## 2025-02-20 VITALS
BODY MASS INDEX: 31.97 KG/M2 | WEIGHT: 223.3 LBS | HEART RATE: 77 BPM | HEIGHT: 70 IN | DIASTOLIC BLOOD PRESSURE: 66 MMHG | TEMPERATURE: 97.6 F | SYSTOLIC BLOOD PRESSURE: 120 MMHG | OXYGEN SATURATION: 97 %

## 2025-02-20 DIAGNOSIS — N20.0 NEPHROLITHIASIS: Primary | ICD-10-CM

## 2025-02-20 DIAGNOSIS — N28.1 RENAL CYST: ICD-10-CM

## 2025-02-20 DIAGNOSIS — Z12.5 SCREENING FOR PROSTATE CANCER: ICD-10-CM

## 2025-02-20 PROCEDURE — 99213 OFFICE O/P EST LOW 20 MIN: CPT

## 2025-02-20 NOTE — PROGRESS NOTES
Name: Andriy Castillo      : 1960      MRN: [id number]  Encounter Provider: STEPH Morataya  Encounter Date: 2025   [facility]    :  Assessment & Plan  Nephrolithiasis  Status post right ureteroscopy with lithotripsy on 2024 for a 1.1 cm obstructing calculi.  Follow-up ultrasound shows a residual 5 mm right lower pole calculi.  This was noted on prior CT and is stable.  I recommend continued surveillance with a renal ultrasound in 1 year.    Orders:    US kidney and bladder; Future    Renal cyst  Bilateral renal cyst without suspicious features.  On the left there is a 5.4 cm upper pole anechoic septated cyst versus 2 adjacent simple cyst.  Recommend repeat ultrasound in 1 year for continued surveillance.    Orders:    US kidney and bladder; Future    Screening for prostate cancer  Orders placed for baseline PSA and I will call with those results.    Orders:    PSA, Total Screen; Future          Interval HPI:    He presents today reporting doing well and offers no complaints.  He is surprised to see that he still has a 5 mm right lower pole calculi.  I reviewed both his CT and ultrasound imaging with him today.  I believe that the right lower pole calculi is simply a residual stone that was left untreated during his recent ureteroscopy.  CT imaging had noted a 4 cm right lower pole calculi surgery.    History of Present Illness     Patient is status post right ureteroscopy with stone extraction on 2024 with Dr. Ordoñez.  Follow-up renal ultrasound from 2025 shows a 5 mm right lower pole calculi nonobstructing.  He has bilateral renal cyst without suspicious features.  There is a 4.5 cm left upper pole anechoic septated cyst versus 2 adjacent simple cyst.  The larger of which measures 4.5 cm.  Similar to appearance to prior CT.          Objective   There were no vitals taken for this visit.    Review of Systems   Constitutional:   Negative for chills and fever.   HENT:  Negative for congestion and sore throat.    Respiratory:  Negative for cough and shortness of breath.    Cardiovascular:  Negative for chest pain and leg swelling.   Gastrointestinal:  Negative for abdominal pain, constipation and diarrhea.   Genitourinary:  Negative for difficulty urinating, dysuria, frequency, hematuria and urgency.   Musculoskeletal:  Negative for back pain and gait problem.   Skin:  Negative for wound.   Allergic/Immunologic: Negative for immunocompromised state.   Hematological:  Does not bruise/bleed easily.       Physical Exam  Vitals and nursing note reviewed.   Constitutional:       General: He is not in acute distress.     Appearance: He is well-developed.   HENT:      Head: Normocephalic and atraumatic.   Eyes:      Conjunctiva/sclera: Conjunctivae normal.   Cardiovascular:      Rate and Rhythm: Normal rate and regular rhythm.      Heart sounds: No murmur heard.  Pulmonary:      Effort: Pulmonary effort is normal. No respiratory distress.      Breath sounds: Normal breath sounds.   Abdominal:      Palpations: Abdomen is soft.      Tenderness: There is no abdominal tenderness.   Musculoskeletal:         General: No swelling.      Cervical back: Neck supple.   Skin:     General: Skin is warm and dry.      Capillary Refill: Capillary refill takes less than 2 seconds.   Neurological:      Mental Status: He is alert.   Psychiatric:         Mood and Affect: Mood normal.           Imagin2025    RENAL ULTRASOUND     INDICATION: N13.2: Hydronephrosis with renal and ureteral calculous obstruction  Z96.0: Presence of urogenital implants.     COMPARISON: CT abdomen and pelvis 10/31/2024     TECHNIQUE: Ultrasound of the retroperitoneum was performed with a curvilinear transducer utilizing volumetric sweeps and still imaging techniques.     FINDINGS:     KIDNEYS:  Symmetric and normal size.  Right kidney: 11.9 x 6.7 x 6.1 cm. Volume 255.4 mL  Left  kidney: 13.6 x 6.3 x 6.2 cm. Volume 276.8 mL     Right kidney  Normal echogenicity and contour.  No mass is identified. 2 cm lower pole simple cyst.  No hydronephrosis.  5 mm lower pole calculus.  No perinephric fluid collections.     Left kidney  Normal echogenicity and contour.  No mass is identified. 5.4 cm upper pole anechoic septated cyst versus 2 adjacent simple cysts, the larger of which measures 4.5 cm. This is similar in appearance to the prior CT. No suspicious features.  No hydronephrosis.  No shadowing calculi.  No perinephric fluid collections.     URETERS:  Nonvisualized.     BLADDER:  Normally distended.  No focal thickening or mass lesions.  Bilateral ureteral jets detected.        IMPRESSION:     No hydronephrosis. Bilateral ureteral jets are seen.     5 mm right renal calculus.     Bilateral renal cysts without suspicious features.          Please Note:  Voice dictation software has been used to create this document. There may be inadvertent transcriptions errors.     STEPH Morataya 02/20/25

## 2025-03-03 ENCOUNTER — APPOINTMENT (OUTPATIENT)
Dept: LAB | Facility: MEDICAL CENTER | Age: 65
End: 2025-03-03
Payer: COMMERCIAL

## 2025-03-03 DIAGNOSIS — Z12.5 SCREENING FOR PROSTATE CANCER: ICD-10-CM

## 2025-03-03 DIAGNOSIS — I48.0 PAROXYSMAL ATRIAL FIBRILLATION (HCC): ICD-10-CM

## 2025-03-03 LAB
BASOPHILS # BLD AUTO: 0.08 THOUSANDS/ÂΜL (ref 0–0.1)
BASOPHILS NFR BLD AUTO: 1 % (ref 0–1)
EOSINOPHIL # BLD AUTO: 0.12 THOUSAND/ÂΜL (ref 0–0.61)
EOSINOPHIL NFR BLD AUTO: 2 % (ref 0–6)
ERYTHROCYTE [DISTWIDTH] IN BLOOD BY AUTOMATED COUNT: 14.1 % (ref 11.6–15.1)
HCT VFR BLD AUTO: 53.8 % (ref 36.5–49.3)
HGB BLD-MCNC: 17.5 G/DL (ref 12–17)
IMM GRANULOCYTES # BLD AUTO: 0.01 THOUSAND/UL (ref 0–0.2)
IMM GRANULOCYTES NFR BLD AUTO: 0 % (ref 0–2)
LYMPHOCYTES # BLD AUTO: 1.59 THOUSANDS/ÂΜL (ref 0.6–4.47)
LYMPHOCYTES NFR BLD AUTO: 22 % (ref 14–44)
MCH RBC QN AUTO: 28.2 PG (ref 26.8–34.3)
MCHC RBC AUTO-ENTMCNC: 32.5 G/DL (ref 31.4–37.4)
MCV RBC AUTO: 87 FL (ref 82–98)
MONOCYTES # BLD AUTO: 0.85 THOUSAND/ÂΜL (ref 0.17–1.22)
MONOCYTES NFR BLD AUTO: 12 % (ref 4–12)
NEUTROPHILS # BLD AUTO: 4.59 THOUSANDS/ÂΜL (ref 1.85–7.62)
NEUTS SEG NFR BLD AUTO: 63 % (ref 43–75)
NRBC BLD AUTO-RTO: 0 /100 WBCS
PLATELET # BLD AUTO: 250 THOUSANDS/UL (ref 149–390)
PMV BLD AUTO: 11.2 FL (ref 8.9–12.7)
RBC # BLD AUTO: 6.2 MILLION/UL (ref 3.88–5.62)
WBC # BLD AUTO: 7.24 THOUSAND/UL (ref 4.31–10.16)

## 2025-03-03 PROCEDURE — 36415 COLL VENOUS BLD VENIPUNCTURE: CPT

## 2025-03-03 PROCEDURE — 85025 COMPLETE CBC W/AUTO DIFF WBC: CPT

## 2025-03-31 ENCOUNTER — APPOINTMENT (OUTPATIENT)
Dept: LAB | Facility: MEDICAL CENTER | Age: 65
End: 2025-03-31
Payer: COMMERCIAL

## 2025-03-31 LAB — PSA SERPL-MCNC: 0.48 NG/ML (ref 0–4)

## 2025-03-31 PROCEDURE — G0103 PSA SCREENING: HCPCS

## 2025-04-01 ENCOUNTER — RESULTS FOLLOW-UP (OUTPATIENT)
Dept: OTHER | Facility: HOSPITAL | Age: 65
End: 2025-04-01

## 2025-05-20 ENCOUNTER — APPOINTMENT (OUTPATIENT)
Dept: LAB | Facility: MEDICAL CENTER | Age: 65
End: 2025-05-20
Attending: ORTHOPAEDIC SURGERY
Payer: COMMERCIAL

## 2025-05-20 DIAGNOSIS — Z01.818 PRE-OP EXAM: ICD-10-CM

## 2025-05-20 LAB
ANION GAP SERPL CALCULATED.3IONS-SCNC: 9 MMOL/L (ref 4–13)
BUN SERPL-MCNC: 19 MG/DL (ref 5–25)
CALCIUM SERPL-MCNC: 9.6 MG/DL (ref 8.4–10.2)
CHLORIDE SERPL-SCNC: 105 MMOL/L (ref 96–108)
CO2 SERPL-SCNC: 28 MMOL/L (ref 21–32)
CREAT SERPL-MCNC: 1.08 MG/DL (ref 0.6–1.3)
ERYTHROCYTE [DISTWIDTH] IN BLOOD BY AUTOMATED COUNT: 14.9 % (ref 11.6–15.1)
GFR SERPL CREATININE-BSD FRML MDRD: 71 ML/MIN/1.73SQ M
GLUCOSE SERPL-MCNC: 109 MG/DL (ref 65–140)
HCT VFR BLD AUTO: 50.3 % (ref 36.5–49.3)
HGB BLD-MCNC: 16.2 G/DL (ref 12–17)
MCH RBC QN AUTO: 28.3 PG (ref 26.8–34.3)
MCHC RBC AUTO-ENTMCNC: 32.2 G/DL (ref 31.4–37.4)
MCV RBC AUTO: 88 FL (ref 82–98)
PLATELET # BLD AUTO: 171 THOUSANDS/UL (ref 149–390)
PMV BLD AUTO: 11.6 FL (ref 8.9–12.7)
POTASSIUM SERPL-SCNC: 4.5 MMOL/L (ref 3.5–5.3)
RBC # BLD AUTO: 5.73 MILLION/UL (ref 3.88–5.62)
SODIUM SERPL-SCNC: 142 MMOL/L (ref 135–147)
WBC # BLD AUTO: 5.45 THOUSAND/UL (ref 4.31–10.16)

## 2025-05-20 PROCEDURE — 36415 COLL VENOUS BLD VENIPUNCTURE: CPT

## 2025-05-20 PROCEDURE — 85027 COMPLETE CBC AUTOMATED: CPT

## 2025-05-20 PROCEDURE — 80048 BASIC METABOLIC PNL TOTAL CA: CPT

## 2025-06-17 ENCOUNTER — TRANSCRIBE ORDERS (OUTPATIENT)
Dept: LAB | Facility: MEDICAL CENTER | Age: 65
End: 2025-06-17

## 2025-06-17 ENCOUNTER — APPOINTMENT (OUTPATIENT)
Dept: LAB | Facility: MEDICAL CENTER | Age: 65
End: 2025-06-17
Attending: INTERNAL MEDICINE
Payer: COMMERCIAL

## 2025-06-17 DIAGNOSIS — I51.9 MYXEDEMA HEART DISEASE: ICD-10-CM

## 2025-06-17 DIAGNOSIS — E03.9 MYXEDEMA HEART DISEASE: ICD-10-CM

## 2025-06-17 LAB — TSH SERPL DL<=0.05 MIU/L-ACNC: 1.91 UIU/ML (ref 0.45–4.5)

## 2025-06-17 PROCEDURE — 84443 ASSAY THYROID STIM HORMONE: CPT

## 2025-06-17 PROCEDURE — 36415 COLL VENOUS BLD VENIPUNCTURE: CPT

## 2025-06-25 ENCOUNTER — APPOINTMENT (OUTPATIENT)
Dept: LAB | Facility: MEDICAL CENTER | Age: 65
End: 2025-06-25
Attending: INTERNAL MEDICINE
Payer: COMMERCIAL

## 2025-06-25 DIAGNOSIS — E78.00 HYPERCHOLESTEROLEMIA: ICD-10-CM

## 2025-06-25 PROCEDURE — 36415 COLL VENOUS BLD VENIPUNCTURE: CPT

## 2025-06-25 PROCEDURE — 83695 ASSAY OF LIPOPROTEIN(A): CPT

## 2025-06-26 LAB — LPA SERPL-SCNC: 175 NMOL/L

## (undated) DEVICE — CHLORHEXIDINE 4PCT 4 OZ

## (undated) DEVICE — SYRINGE 50ML LL

## (undated) DEVICE — GLOVE SRG BIOGEL ECLIPSE 7.5

## (undated) DEVICE — CATH URETERAL 5FR X 70 CM FLEX TIP POLYUR BARD

## (undated) DEVICE — GLOVE INDICATOR PI UNDERGLOVE SZ 8 BLUE

## (undated) DEVICE — SPECIMEN CONTAINER STERILE PEEL PACK

## (undated) DEVICE — SYRINGE 10ML LL

## (undated) DEVICE — FIBER STD QUANTA 272 MICRON

## (undated) DEVICE — SINGLE-USE DIGITAL FLEXIBLE URETEROSCOPE: Brand: APTRA

## (undated) DEVICE — PREMIUM DRY TRAY LF: Brand: MEDLINE INDUSTRIES, INC.

## (undated) DEVICE — BASKET SPECIMEN RETRIVAL 1.9FR 120CM

## (undated) DEVICE — GUIDEWIRE STRGHT TIP 0.035 IN  SOLO PLUS

## (undated) DEVICE — PACK TUR

## (undated) DEVICE — SHEATH URETERAL ACCESS 12/14FR 45CM PROXIS